# Patient Record
Sex: FEMALE | Race: WHITE | Employment: UNEMPLOYED | URBAN - METROPOLITAN AREA
[De-identification: names, ages, dates, MRNs, and addresses within clinical notes are randomized per-mention and may not be internally consistent; named-entity substitution may affect disease eponyms.]

---

## 2017-07-13 ENCOUNTER — HOSPITAL ENCOUNTER (EMERGENCY)
Age: 72
Discharge: HOME | End: 2017-07-13
Attending: EMERGENCY MEDICINE
Payer: MEDICARE

## 2017-07-13 ENCOUNTER — APPOINTMENT (OUTPATIENT)
Dept: GENERAL RADIOLOGY | Age: 72
End: 2017-07-13
Attending: EMERGENCY MEDICINE
Payer: MEDICARE

## 2017-07-13 VITALS
SYSTOLIC BLOOD PRESSURE: 136 MMHG | TEMPERATURE: 98.1 F | RESPIRATION RATE: 17 BRPM | HEIGHT: 63 IN | HEART RATE: 61 BPM | WEIGHT: 214.29 LBS | BODY MASS INDEX: 37.97 KG/M2 | OXYGEN SATURATION: 99 % | DIASTOLIC BLOOD PRESSURE: 88 MMHG

## 2017-07-13 DIAGNOSIS — I25.119 ATHEROSCLEROSIS OF NATIVE CORONARY ARTERY WITH ANGINA PECTORIS, UNSPECIFIED WHETHER NATIVE OR TRANSPLANTED HEART (HCC): ICD-10-CM

## 2017-07-13 DIAGNOSIS — R07.9 CHEST PAIN, UNSPECIFIED TYPE: Primary | ICD-10-CM

## 2017-07-13 DIAGNOSIS — J90 PLEURAL EFFUSION: ICD-10-CM

## 2017-07-13 DIAGNOSIS — R06.02 SOB (SHORTNESS OF BREATH): ICD-10-CM

## 2017-07-13 LAB
ALBUMIN SERPL BCP-MCNC: 3 G/DL (ref 3.4–5)
ALBUMIN/GLOB SERPL: 0.9 {RATIO} (ref 0.8–1.7)
ALP SERPL-CCNC: 77 U/L (ref 45–117)
ALT SERPL-CCNC: 16 U/L (ref 13–56)
ANION GAP BLD CALC-SCNC: 10 MMOL/L (ref 3–18)
APPEARANCE UR: CLEAR
APTT PPP: 28.6 SEC (ref 23–36.4)
AST SERPL W P-5'-P-CCNC: 19 U/L (ref 15–37)
BACTERIA URNS QL MICRO: ABNORMAL /HPF
BILIRUB SERPL-MCNC: 0.3 MG/DL (ref 0.2–1)
BILIRUB UR QL: ABNORMAL
BUN SERPL-MCNC: 22 MG/DL (ref 7–18)
BUN/CREAT SERPL: 18 (ref 12–20)
CALCIUM SERPL-MCNC: 8.4 MG/DL (ref 8.5–10.1)
CHLORIDE SERPL-SCNC: 108 MMOL/L (ref 100–108)
CK MB CFR SERPL CALC: 1.8 % (ref 0–4)
CK MB SERPL-MCNC: 1.4 NG/ML (ref 5–25)
CK SERPL-CCNC: 80 U/L (ref 26–192)
CO2 SERPL-SCNC: 25 MMOL/L (ref 21–32)
COLOR UR: YELLOW
CREAT SERPL-MCNC: 1.2 MG/DL (ref 0.6–1.3)
EPITH CASTS URNS QL MICRO: ABNORMAL /LPF (ref 0–5)
ERYTHROCYTE [DISTWIDTH] IN BLOOD BY AUTOMATED COUNT: 13.3 % (ref 11.6–14.5)
GLOBULIN SER CALC-MCNC: 3.2 G/DL (ref 2–4)
GLUCOSE SERPL-MCNC: 241 MG/DL (ref 74–99)
GLUCOSE UR STRIP.AUTO-MCNC: 500 MG/DL
HCT VFR BLD AUTO: 41 % (ref 35–45)
HGB BLD-MCNC: 14 G/DL (ref 12–16)
HGB UR QL STRIP: ABNORMAL
INR PPP: 1.1 (ref 0.8–1.2)
KETONES UR QL STRIP.AUTO: NEGATIVE MG/DL
LEUKOCYTE ESTERASE UR QL STRIP.AUTO: NEGATIVE
MCH RBC QN AUTO: 29.7 PG (ref 24–34)
MCHC RBC AUTO-ENTMCNC: 34.1 G/DL (ref 31–37)
MCV RBC AUTO: 86.9 FL (ref 74–97)
NITRITE UR QL STRIP.AUTO: NEGATIVE
PH UR STRIP: 5.5 [PH] (ref 5–8)
PLATELET # BLD AUTO: 116 K/UL (ref 135–420)
PMV BLD AUTO: 11.5 FL (ref 9.2–11.8)
POTASSIUM SERPL-SCNC: 4 MMOL/L (ref 3.5–5.5)
PROT SERPL-MCNC: 6.2 G/DL (ref 6.4–8.2)
PROT UR STRIP-MCNC: >300 MG/DL
PROTHROMBIN TIME: 14.1 SEC (ref 11.5–15.2)
RBC # BLD AUTO: 4.72 M/UL (ref 4.2–5.3)
RBC #/AREA URNS HPF: ABNORMAL /HPF (ref 0–5)
SODIUM SERPL-SCNC: 143 MMOL/L (ref 136–145)
SP GR UR REFRACTOMETRY: 1.03 (ref 1–1.03)
TROPONIN I SERPL-MCNC: <0.02 NG/ML (ref 0–0.04)
UROBILINOGEN UR QL STRIP.AUTO: 0.2 EU/DL (ref 0.2–1)
WBC # BLD AUTO: 5.7 K/UL (ref 4.6–13.2)
WBC URNS QL MICRO: ABNORMAL /HPF (ref 0–4)

## 2017-07-13 PROCEDURE — 80053 COMPREHEN METABOLIC PANEL: CPT | Performed by: EMERGENCY MEDICINE

## 2017-07-13 PROCEDURE — 85610 PROTHROMBIN TIME: CPT | Performed by: EMERGENCY MEDICINE

## 2017-07-13 PROCEDURE — 85027 COMPLETE CBC AUTOMATED: CPT | Performed by: EMERGENCY MEDICINE

## 2017-07-13 PROCEDURE — 85730 THROMBOPLASTIN TIME PARTIAL: CPT | Performed by: EMERGENCY MEDICINE

## 2017-07-13 PROCEDURE — 99285 EMERGENCY DEPT VISIT HI MDM: CPT

## 2017-07-13 PROCEDURE — 81001 URINALYSIS AUTO W/SCOPE: CPT | Performed by: EMERGENCY MEDICINE

## 2017-07-13 PROCEDURE — 74011250636 HC RX REV CODE- 250/636: Performed by: EMERGENCY MEDICINE

## 2017-07-13 PROCEDURE — 93005 ELECTROCARDIOGRAM TRACING: CPT

## 2017-07-13 PROCEDURE — 82550 ASSAY OF CK (CPK): CPT | Performed by: EMERGENCY MEDICINE

## 2017-07-13 PROCEDURE — 71010 XR CHEST SNGL V: CPT

## 2017-07-13 RX ORDER — LEVOFLOXACIN 750 MG/1
750 TABLET ORAL DAILY
Qty: 5 TAB | Refills: 0 | Status: SHIPPED | OUTPATIENT
Start: 2017-07-13 | End: 2017-07-18

## 2017-07-13 RX ADMIN — SODIUM CHLORIDE 1000 ML: 900 INJECTION, SOLUTION INTRAVENOUS at 18:32

## 2017-07-13 NOTE — ED NOTES
Left without discharge instructionsPurposeful rounding completed:    Side rails up x 1:  YES   Bed low and wheels and locked: YES   Call bell in reach: YES   Comfort addressed: YES     Toileting needs addressed: YES   Plan of care reviewed/updated with patient and or family members: YES   IV site assessed: YES  Pain assessed and addressed: YES0

## 2017-07-13 NOTE — ED PROVIDER NOTES
HPI Comments: 3:57 PM Norma Martinez is a 73 y.o. female with a hx of DM, HTN, COPD, CAD, and MI who presents to the ED via EMS c/o exertional episodes of chest pain and SOB for the past 3 weeks. The pt notes that her episodes take hours and sometimes days to resolve. She continues to explain that her discomfort makes her anxious as well and the only way to ease the anxiety is to talk to friends. The patient reports that she had a MI 4 years ago w/ cardiac stents placed. The patient denies a h/o anxiety and depression, leg swelling, abd pain, NVD, black or bloody stool, and any other sx. PCP:  Elham Schroeder MD      The history is provided by the patient. Past Medical History:   Diagnosis Date    Arthritis     Bilateral cataracts     sees Dr. Harjit Gomez    COPD     Coronary artery disease     Cx - 2.75 x 33mm XIENCE 10/13     Diabetes     Dyslipidemia     Hypertension     MI (myocardial infarction) ([address]     Noncompliance     Obesity        Past Surgical History:   Procedure Laterality Date    HX CORONARY STENT PLACEMENT  2013         Family History:   Problem Relation Age of Onset    Arthritis-osteo Mother     Heart Disease Mother     Alcohol abuse Neg Hx     Asthma Neg Hx     Bleeding Prob Neg Hx     Cancer Neg Hx     Diabetes Neg Hx     Elevated Lipids Neg Hx     Headache Neg Hx     Hypertension Neg Hx     Lung Disease Neg Hx     Migraines Neg Hx     Psychiatric Disorder Neg Hx     Stroke Neg Hx     Mental Retardation Neg Hx        Social History     Social History    Marital status: UNKNOWN     Spouse name: N/A    Number of children: N/A    Years of education: N/A     Occupational History    Not on file.      Social History Main Topics    Smoking status: Former Smoker     Packs/day: 1.00     Years: 54.00     Start date: 2/10/2014    Smokeless tobacco: Never Used      Comment: pt states she uses e-cigs to stop smoking    Alcohol use No    Drug use: No    Sexual activity: Not Currently     Other Topics Concern    Not on file     Social History Narrative         ALLERGIES: Review of patient's allergies indicates no known allergies. Review of Systems   Constitutional: Negative for diaphoresis and fever. HENT: Negative for ear pain, rhinorrhea and trouble swallowing. Eyes: Negative for visual disturbance. Respiratory: Positive for shortness of breath. Negative for cough. Cardiovascular: Positive for chest pain. Negative for leg swelling. Gastrointestinal: Negative for abdominal pain, blood in stool, diarrhea, nausea and vomiting. Genitourinary: Negative for difficulty urinating, flank pain and hematuria. Musculoskeletal: Negative for back pain and neck pain. Skin: Negative for rash. Neurological: Negative for dizziness, weakness, numbness and headaches. Hematological: Negative. Psychiatric/Behavioral: Negative. All other systems reviewed and are negative. Vitals:    07/13/17 1500 07/13/17 1500 07/13/17 1503 07/13/17 1855   BP: 109/52   158/76   Pulse: 62  (!) 57 68   Resp: 16  17 14   Temp:       SpO2: 98% 100% 95% 98%   Weight:       Height:                Physical Exam   Constitutional: She is oriented to person, place, and time. She appears well-developed and well-nourished. No distress. HENT:   Head: Normocephalic and atraumatic. Right Ear: External ear normal.   Left Ear: External ear normal.   Nose: Nose normal.   Mouth/Throat: Oropharynx is clear and moist. Mucous membranes are dry. Eyes: Conjunctivae and EOM are normal. Pupils are equal, round, and reactive to light. No scleral icterus. Neck: Normal range of motion. Neck supple. No JVD present. No tracheal deviation present. No thyromegaly present. Cardiovascular: Normal rate, regular rhythm, normal heart sounds and intact distal pulses. Exam reveals no gallop and no friction rub. No murmur heard.   Pulmonary/Chest: Effort normal and breath sounds normal. She exhibits no tenderness. Abdominal: Soft. Bowel sounds are normal. She exhibits no distension. There is no tenderness. There is no rebound and no guarding. Musculoskeletal: Normal range of motion. She exhibits no edema or tenderness. Lymphadenopathy:     She has no cervical adenopathy. Neurological: She is alert and oriented to person, place, and time. No cranial nerve deficit. Coordination normal.   No sensory loss, Gait normal, Motor 5/5   Skin: Skin is warm and dry. Psychiatric: She has a normal mood and affect. Her behavior is normal. Judgment and thought content normal.   Nursing note and vitals reviewed. MDM  Number of Diagnoses or Management Options  Diagnosis management comments: 74 y/o diabetic w/ a h/o CAD w/ stents 4 years ago presents w/ exertional CP for the last 3 weeks. Will check cardiac labs and contact pt's cardiologist. Pt needs admission. ED Course       Procedures    Vitals:  Patient Vitals for the past 12 hrs:   Temp Pulse Resp BP SpO2   07/13/17 1855 - 68 14 158/76 98 %   07/13/17 1503 - (!) 57 17 - 95 %   07/13/17 1500 - - - - 100 %   07/13/17 1500 - 62 16 109/52 98 %   07/13/17 1455 - 66 14 - 96 %   07/13/17 1451 98.1 °F (36.7 °C) 68 14 112/52 97 %   95% on RA, indicating adequate oxygenation.         Medications ordered:   Medications   sodium chloride 0.9 % bolus infusion 1,000 mL (0 mL IntraVENous IV Completed 7/13/17 1900)         Lab findings:  Recent Results (from the past 12 hour(s))   CARDIAC PANEL,(CK, CKMB & TROPONIN)    Collection Time: 07/13/17  2:55 PM   Result Value Ref Range    CK 80 26 - 192 U/L    CK - MB 1.4 <3.6 ng/ml    CK-MB Index 1.8 0.0 - 4.0 %    Troponin-I, Qt. <0.02 0.0 - 0.045 NG/ML   CBC W/O DIFF    Collection Time: 07/13/17  2:55 PM   Result Value Ref Range    WBC 5.7 4.6 - 13.2 K/uL    RBC 4.72 4.20 - 5.30 M/uL    HGB 14.0 12.0 - 16.0 g/dL    HCT 41.0 35.0 - 45.0 %    MCV 86.9 74.0 - 97.0 FL    MCH 29.7 24.0 - 34.0 PG    MCHC 34.1 31.0 - 37.0 g/dL    RDW 13.3 11.6 - 14.5 %    PLATELET 502 (L) 141 - 420 K/uL    MPV 11.5 9.2 - 31.5 FL   METABOLIC PANEL, COMPREHENSIVE    Collection Time: 07/13/17  2:55 PM   Result Value Ref Range    Sodium 143 136 - 145 mmol/L    Potassium 4.0 3.5 - 5.5 mmol/L    Chloride 108 100 - 108 mmol/L    CO2 25 21 - 32 mmol/L    Anion gap 10 3.0 - 18 mmol/L    Glucose 241 (H) 74 - 99 mg/dL    BUN 22 (H) 7.0 - 18 MG/DL    Creatinine 1.20 0.6 - 1.3 MG/DL    BUN/Creatinine ratio 18 12 - 20      GFR est AA 54 (L) >60 ml/min/1.73m2    GFR est non-AA 45 (L) >60 ml/min/1.73m2    Calcium 8.4 (L) 8.5 - 10.1 MG/DL    Bilirubin, total 0.3 0.2 - 1.0 MG/DL    ALT (SGPT) 16 13 - 56 U/L    AST (SGOT) 19 15 - 37 U/L    Alk. phosphatase 77 45 - 117 U/L    Protein, total 6.2 (L) 6.4 - 8.2 g/dL    Albumin 3.0 (L) 3.4 - 5.0 g/dL    Globulin 3.2 2.0 - 4.0 g/dL    A-G Ratio 0.9 0.8 - 1.7     PROTHROMBIN TIME + INR    Collection Time: 07/13/17  2:55 PM   Result Value Ref Range    Prothrombin time 14.1 11.5 - 15.2 sec    INR 1.1 0.8 - 1.2     PTT    Collection Time: 07/13/17  2:55 PM   Result Value Ref Range    aPTT 28.6 23.0 - 36.4 SEC   EKG, 12 LEAD, INITIAL    Collection Time: 07/13/17  3:01 PM   Result Value Ref Range    Ventricular Rate 62 BPM    Atrial Rate 62 BPM    P-R Interval 200 ms    QRS Duration 132 ms    Q-T Interval 448 ms    QTC Calculation (Bezet) 454 ms    Calculated P Axis 100 degrees    Calculated R Axis -41 degrees    Calculated T Axis 17 degrees    Diagnosis       Normal sinus rhythm with sinus arrhythmia  Left axis deviation  Right bundle branch block  Voltage criteria for left ventricular hypertrophy  Abnormal ECG  When compared with ECG of 25-AUG-2016 18:42,  No significant change was found         EKG interpretation by ED Physician: 62 hr/sr  Left axis  rbbb  lvh      X-Ray, CT or other radiology findings or impressions:  XR CHEST SNGL V    (Results Pending)   I will treat for possible pneumonia on left lung.     Progress notes, Consult notes or additional Procedure notes:       Reevaluation of patient:   6:58 PM Rechecked the patient and discussed all lab results. The pt understands all labs and concern for heart failure with past history. The patient is refusing to stay and would like to go home without cardiac work-up. I will call pt's cardiologist and attempt to get out pateint work up for her. She undersatnds this is suboptimal and needs close follow up. She verbalizes that she agrees to follow up and understands the risks. 7:11 PM Pt CXR reveals pleural infusion vs infection. Pt states she has no SOB, cough, or CP. I explained that she may have bad pneumonia or infection but she still wants to leave. Pt understand the urgency and criticality of her staying but she has things to take care of at home. All worseing of symptoms and death, and irregular heart beat, stroke and permanent disability are discussed and patient verbalizes her understanding. 7:13 PM I discussed care with [facility], cardiology [place]. She states that they will follow up with patient in out patient. Disposition:  Diagnosis:   1. Chest pain, unspecified type    2. Atherosclerosis of native coronary artery with angina pectoris, unspecified whether native or transplanted heart ([facility] [address]    3. SOB (shortness of breath)    4. Pleural effusion        Disposition: Discharge     Follow-up Information     Follow up With Details Comments [address], [place]   [address] [facility] [address] [place] [address]      Zach Gutierrez MD Call in 1 day Follow Up From Emergency Department [address] [facility]  [address]  [facility]  [place] [address] [address]             Patient's Medications   Start Taking    LEVOFLOXACIN (LEVAQUIN) 750 MG TABLET    Take 1 Tab by mouth daily for 5 days. Continue Taking    ASPIRIN 81 MG CHEWABLE TABLET    Take 1 Tab by mouth daily.     BLOOD-GLUCOSE METER MONITORING KIT    Use tid    GABAPENTIN (NEURONTIN) 300 MG CAPSULE    Take 1 capsule by mouth nightly. GLUCOSE 4 GRAM CHEWABLE TABLET    Take 4 tablets by mouth as needed (hypoglcemia). GLUCOSE BLOOD VI TEST STRIPS (ASCENSIA AUTODISC VI, ONE TOUCH ULTRA TEST VI) STRIP    by Does Not Apply route See Admin Instructions. Use tid    INSULIN ASPART (NOVOLOG FLEXPEN) 100 UNIT/ML FLEXPEN    3 Units by SubCUTAneous route Before breakfast, lunch, and dinner. INSULIN GLARGINE (LANTUS SOLOSTAR) 100 UNIT/ML (3 ML) PEN    10 Units by SubCUTAneous route daily. INSULIN NEEDLES, DISPOSABLE, (LITE TOUCH INSULIN PEN NEEDLES) 31 X 1/4 \" NDLE    1 each by Does Not Apply route five (5) times daily. INSULIN NEEDLES, DISPOSABLE, 31 X 5/16 \" NDLE    Use bid    INSULIN NEEDLES, DISPOSABLE, 31 X 5/16 \" NDLE    1qd    LANCETS MISC    Use tid    NITROGLYCERIN (NITROSTAT) 0.4 MG SL TABLET    1 Tab by SubLINGual route every five (5) minutes as needed for Chest Pain. These Medications have changed    No medications on file   Stop Taking    No medications on file         SCRIBE ATTESTATION STATEMENT  Documented by: Praveen James scribing for, and in the presence of, Camila Hudson MD 07/13/17 7:22 PM     Signed by: Madeleine Juarez, 07/13/17 4:13 PM     PROVIDER ATTESTATION STATEMENT  I personally performed the services described in the documentation, reviewed the documentation, as recorded by the scribe in my presence, and it accurately and completely records my words and actions.   Camila Hudson MD

## 2017-07-13 NOTE — DISCHARGE INSTRUCTIONS
Chest Pain: Care Instructions  Your Care Instructions  There are many things that can cause chest pain. Some are not serious and will get better on their own in a few days. But some kinds of chest pain need more testing and treatment. Your doctor may have recommended a follow-up visit in the next 8 to 12 hours. If you are not getting better, you may need more tests or treatment. Even though your doctor has released you, you still need to watch for any problems. The doctor carefully checked you, but sometimes problems can develop later. If you have new symptoms or if your symptoms do not get better, get medical care right away. If you have worse or different chest pain or pressure that lasts more than 5 minutes or you passed out (lost consciousness), call 911 or seek other emergency help right away. A medical visit is only one step in your treatment. Even if you feel better, you still need to do what your doctor recommends, such as going to all suggested follow-up appointments and taking medicines exactly as directed. This will help you recover and help prevent future problems. How can you care for yourself at home? · Rest until you feel better. · Take your medicine exactly as prescribed. Call your doctor if you think you are having a problem with your medicine. · Do not drive after taking a prescription pain medicine. When should you call for help? Call 911 if:  · You passed out (lost consciousness). · You have severe difficulty breathing. · You have symptoms of a heart attack. These may include:  ¨ Chest pain or pressure, or a strange feeling in your chest.  ¨ Sweating. ¨ Shortness of breath. ¨ Nausea or vomiting. ¨ Pain, pressure, or a strange feeling in your back, neck, jaw, or upper belly or in one or both shoulders or arms. ¨ Lightheadedness or sudden weakness. ¨ A fast or irregular heartbeat.   After you call 911, the  may tell you to chew 1 adult-strength or 2 to 4 low-dose aspirin. Wait for an ambulance. Do not try to drive yourself. Call your doctor today if:  · You have any trouble breathing. · Your chest pain gets worse. · You are dizzy or lightheaded, or you feel like you may faint. · You are not getting better as expected. · You are having new or different chest pain. Where can you learn more? Go to http://maureen-satish.info/. Enter A120 in the search box to learn more about \"Chest Pain: Care Instructions. \"  Current as of: March 20, 2017  Content Version: 11.3  © 9404-3544 [employer]. Care instructions adapted under license by [employer] (which disclaims liability or warranty for this information). If you have questions about a medical condition or this instruction, always ask your healthcare professional. [address] any warranty or liability for your use of this information. Angina: Care Instructions  Your Care Instructions    You have a problem called angina. Angina happens when there is not enough blood flow to your heart muscle. Angina is a sign of coronary artery disease (CAD). CAD occurs when blood vessels that supply the heart become narrowed. Having CAD increases your risk of a heart attack. Chest pain or pressure is the most common symptom of angina. But some people have other symptoms, like:  · Pain, pressure, or a strange feeling in the back, neck, jaw, or upper belly, or in one or both shoulders or arms. · Shortness of breath. · Nausea or vomiting. · Lightheadedness or sudden weakness. · Fast or irregular heartbeat. Women are somewhat more likely than men to have angina symptoms like shortness of breath, nausea, and back or jaw pain. Angina can be dangerous. That's why it is important to pay attention to your symptoms. Know what is typical for you, learn how to control your symptoms, and understand when you need to get treatment.   A change in your usual pattern of symptoms is an emergency. It may mean that you are having a heart attack. The doctor has checked you carefully, but problems can develop later. If you notice any problems or new symptoms, get medical treatment right away. Follow-up care is a key part of your treatment and safety. Be sure to make and go to all appointments, and call your doctor if you are having problems. It's also a good idea to know your test results and keep a list of the medicines you take. How can you care for yourself at home? Medicines  · If your doctor has given you nitroglycerin for angina symptoms, keep it with you at all times. If you have symptoms, sit down and rest, and take the first dose of nitroglycerin as directed. If your symptoms get worse or are not getting better within 5 minutes, call 911 right away. Stay on the phone. The emergency  will give you further instructions. · If your doctor advises it, take 1 low-dose aspirin a day to prevent heart attack. · Be safe with medicines. Take your medicines exactly as prescribed. Call your doctor if you think you are having a problem with your medicine. You will get more details on the specific medicines your doctor prescribes. Lifestyle changes  · Do not smoke. If you need help quitting, talk to your doctor about stop-smoking programs and medicines. These can increase your chances of quitting for good. · Eat a heart-healthy diet that is low in saturated fat and salt, and is high in fiber. Talk to your doctor or a dietitian about healthy eating. · Stay at a healthy weight. Or lose weight if you need to. Activity  · Talk to your doctor about a level of activity that is safe for you. · If an activity causes angina symptoms, stop and rest.  When should you call for help? Call 911 anytime you think you may need emergency care. For example, call if:  · You passed out (lost consciousness). · You have symptoms of a heart attack.  These may include:  ¨ Chest pain or pressure, or a strange feeling in the chest.  ¨ Sweating. ¨ Shortness of breath. ¨ Nausea or vomiting. ¨ Pain, pressure, or a strange feeling in the back, neck, jaw, or upper belly or in one or both shoulders or arms. ¨ Lightheadedness or sudden weakness. ¨ A fast or irregular heartbeat. After you call 911, the  may tell you to chew 1 adult-strength or 2 to 4 low-dose aspirin. Wait for an ambulance. Do not try to drive yourself. · You have angina symptoms that do not go away with rest or are not getting better within 5 minutes after you take a dose of nitroglycerin. Call your doctor now or seek immediate medical care if:  · You are having angina symptoms more often than usual, or they are different or worse than usual.  · You feel dizzy or lightheaded, or you feel like you may faint. Watch closely for changes in your health, and be sure to contact your doctor if you have any problems. Where can you learn more? Go to http://maureen-satish.info/. Enter H129 in the search box to learn more about \"Angina: Care Instructions. \"  Current as of: April 3, 2017  Content Version: 11.3  © 4849-3803 [employer]. Care instructions adapted under license by [employer] (which disclaims liability or warranty for this information). If you have questions about a medical condition or this instruction, always ask your healthcare professional. [place] [address] any warranty or liability for your use of this information. Learning About Coronary Artery Disease (CAD)  What is coronary artery disease? Coronary artery disease (CAD) occurs when plaque builds up in the arteries that bring oxygen-rich blood to your heart. Plaque is a fatty substance made of cholesterol, calcium, and other substances in the blood. This process is called hardening of the arteries, or atherosclerosis. What happens when you have coronary artery disease? · Plaque may narrow the coronary arteries. Narrowed arteries cause poor blood flow. This can lead to angina symptoms such as chest pain or discomfort. If blood flow is completely blocked, you could have a heart attack. · You can slow CAD and reduce the risk of future problems by making changes in your lifestyle. These include quitting smoking and eating heart-healthy foods. · Treatments for CAD, along with changes in your lifestyle, can help you live a longer and healthier life. How can you prevent coronary artery disease? · Do not smoke. It may be the best thing you can do to prevent heart disease. If you need help quitting, talk to your doctor about stop-smoking programs and medicines. These can increase your chances of quitting for good. · Be active. Get at least 30 minutes of exercise on most days of the week. Walking is a good choice. You also may want to do other activities, such as running, swimming, cycling, or playing tennis or team sports. · Eat heart-healthy foods. Eat more fruits and vegetables and less foods that contain saturated and trans fats. Limit alcohol, sodium, and sweets. · Stay at a healthy weight. Lose weight if you need to. · Manage other health problems such as diabetes, high blood pressure, and high cholesterol. · Manage stress. Stress can hurt your heart. To keep stress low, talk about your problems and feelings. Don't keep your feelings hidden. · If you have talked about it with your doctor, take a low-dose aspirin every day. Aspirin can help certain people lower their risk of a heart attack or stroke. But taking aspirin isn't right for everyone, because it can cause serious bleeding. Do not start taking daily aspirin unless your doctor knows about it. How is coronary artery disease treated? · Your doctor will suggest that you make lifestyle changes. For example, your doctor may ask you to eat healthy foods, quit smoking, lose extra weight, and be more active. · You will have to take medicines.   · Your doctor may suggest a procedure to open narrowed or blocked arteries. This is called angioplasty. Or your doctor may suggest using healthy blood vessels to create detours around narrowed or blocked arteries. This is called bypass surgery. Follow-up care is a key part of your treatment and safety. Be sure to make and go to all appointments, and call your doctor if you are having problems. It's also a good idea to know your test results and keep a list of the medicines you take. Where can you learn more? Go to http://maureen-satish.info/. Enter [phone] in the search box to learn more about \"Learning About Coronary Artery Disease (CAD). \"  Current as of: December 28, 2016  Content Version: 11.3  © 9156-3833 [employer]. Care instructions adapted under license by [employer] (which disclaims liability or warranty for this information). If you have questions about a medical condition or this instruction, always ask your healthcare professional. [place] [address] any warranty or liability for your use of this information.

## 2017-07-14 ENCOUNTER — HOSPITAL ENCOUNTER (EMERGENCY)
Age: 72
Discharge: HOME | End: 2017-07-15
Attending: EMERGENCY MEDICINE
Payer: MEDICARE

## 2017-07-14 DIAGNOSIS — F17.210 CIGARETTE SMOKER: ICD-10-CM

## 2017-07-14 DIAGNOSIS — R06.00 DYSPNEA, UNSPECIFIED TYPE: Primary | ICD-10-CM

## 2017-07-14 LAB
ATRIAL RATE: 62 BPM
CALCULATED P AXIS, ECG09: 100 DEGREES
CALCULATED R AXIS, ECG10: -41 DEGREES
CALCULATED T AXIS, ECG11: 17 DEGREES
DIAGNOSIS, 93000: NORMAL
P-R INTERVAL, ECG05: 200 MS
Q-T INTERVAL, ECG07: 448 MS
QRS DURATION, ECG06: 132 MS
QTC CALCULATION (BEZET), ECG08: 454 MS
VENTRICULAR RATE, ECG03: 62 BPM

## 2017-07-14 PROCEDURE — 83880 ASSAY OF NATRIURETIC PEPTIDE: CPT | Performed by: EMERGENCY MEDICINE

## 2017-07-14 PROCEDURE — 99284 EMERGENCY DEPT VISIT MOD MDM: CPT

## 2017-07-14 PROCEDURE — 82550 ASSAY OF CK (CPK): CPT | Performed by: EMERGENCY MEDICINE

## 2017-07-14 PROCEDURE — 83735 ASSAY OF MAGNESIUM: CPT | Performed by: EMERGENCY MEDICINE

## 2017-07-14 PROCEDURE — 85379 FIBRIN DEGRADATION QUANT: CPT | Performed by: EMERGENCY MEDICINE

## 2017-07-14 PROCEDURE — 93005 ELECTROCARDIOGRAM TRACING: CPT

## 2017-07-14 PROCEDURE — 80048 BASIC METABOLIC PNL TOTAL CA: CPT | Performed by: EMERGENCY MEDICINE

## 2017-07-14 PROCEDURE — 85025 COMPLETE CBC W/AUTO DIFF WBC: CPT | Performed by: EMERGENCY MEDICINE

## 2017-07-15 ENCOUNTER — APPOINTMENT (OUTPATIENT)
Dept: GENERAL RADIOLOGY | Age: 72
End: 2017-07-15
Attending: EMERGENCY MEDICINE
Payer: MEDICARE

## 2017-07-15 VITALS
OXYGEN SATURATION: 95 % | HEIGHT: 63 IN | RESPIRATION RATE: 16 BRPM | TEMPERATURE: 98.1 F | DIASTOLIC BLOOD PRESSURE: 68 MMHG | HEART RATE: 66 BPM | BODY MASS INDEX: 37.97 KG/M2 | SYSTOLIC BLOOD PRESSURE: 147 MMHG | WEIGHT: 214.29 LBS

## 2017-07-15 LAB
ANION GAP BLD CALC-SCNC: 10 MMOL/L (ref 3–18)
ATRIAL RATE: 76 BPM
BASOPHILS # BLD AUTO: 0 K/UL (ref 0–0.06)
BASOPHILS # BLD: 1 % (ref 0–2)
BNP SERPL-MCNC: 354 PG/ML (ref 0–900)
BUN SERPL-MCNC: 24 MG/DL (ref 7–18)
BUN/CREAT SERPL: 20 (ref 12–20)
CALCIUM SERPL-MCNC: 8.9 MG/DL (ref 8.5–10.1)
CALCULATED P AXIS, ECG09: 100 DEGREES
CALCULATED R AXIS, ECG10: -49 DEGREES
CALCULATED T AXIS, ECG11: 43 DEGREES
CHLORIDE SERPL-SCNC: 110 MMOL/L (ref 100–108)
CK MB CFR SERPL CALC: 1.8 % (ref 0–4)
CK MB SERPL-MCNC: 1.7 NG/ML (ref 5–25)
CK SERPL-CCNC: 93 U/L (ref 26–192)
CO2 SERPL-SCNC: 24 MMOL/L (ref 21–32)
CREAT SERPL-MCNC: 1.18 MG/DL (ref 0.6–1.3)
D DIMER PPP FEU-MCNC: 0.35 UG/ML(FEU)
DIAGNOSIS, 93000: NORMAL
DIFFERENTIAL METHOD BLD: ABNORMAL
EOSINOPHIL # BLD: 0 K/UL (ref 0–0.4)
EOSINOPHIL NFR BLD: 1 % (ref 0–5)
ERYTHROCYTE [DISTWIDTH] IN BLOOD BY AUTOMATED COUNT: 13.3 % (ref 11.6–14.5)
GLUCOSE SERPL-MCNC: 190 MG/DL (ref 74–99)
HCT VFR BLD AUTO: 39.5 % (ref 35–45)
HGB BLD-MCNC: 13.4 G/DL (ref 12–16)
LYMPHOCYTES # BLD AUTO: 23 % (ref 21–52)
LYMPHOCYTES # BLD: 1.3 K/UL (ref 0.9–3.6)
MAGNESIUM SERPL-MCNC: 2.3 MG/DL (ref 1.6–2.6)
MCH RBC QN AUTO: 29.4 PG (ref 24–34)
MCHC RBC AUTO-ENTMCNC: 33.9 G/DL (ref 31–37)
MCV RBC AUTO: 86.6 FL (ref 74–97)
MONOCYTES # BLD: 0.5 K/UL (ref 0.05–1.2)
MONOCYTES NFR BLD AUTO: 9 % (ref 3–10)
NEUTS SEG # BLD: 3.8 K/UL (ref 1.8–8)
NEUTS SEG NFR BLD AUTO: 66 % (ref 40–73)
P-R INTERVAL, ECG05: 308 MS
PLATELET # BLD AUTO: 116 K/UL (ref 135–420)
PMV BLD AUTO: 11.1 FL (ref 9.2–11.8)
POTASSIUM SERPL-SCNC: 3.6 MMOL/L (ref 3.5–5.5)
Q-T INTERVAL, ECG07: 408 MS
QRS DURATION, ECG06: 124 MS
QTC CALCULATION (BEZET), ECG08: 459 MS
RBC # BLD AUTO: 4.56 M/UL (ref 4.2–5.3)
SODIUM SERPL-SCNC: 144 MMOL/L (ref 136–145)
TROPONIN I SERPL-MCNC: <0.02 NG/ML (ref 0–0.04)
VENTRICULAR RATE, ECG03: 76 BPM
WBC # BLD AUTO: 5.7 K/UL (ref 4.6–13.2)

## 2017-07-15 PROCEDURE — 74011250637 HC RX REV CODE- 250/637: Performed by: EMERGENCY MEDICINE

## 2017-07-15 PROCEDURE — 71010 XR CHEST PORT: CPT

## 2017-07-15 RX ORDER — LORAZEPAM 1 MG/1
1 TABLET ORAL
Status: COMPLETED | OUTPATIENT
Start: 2017-07-15 | End: 2017-07-15

## 2017-07-15 RX ADMIN — LORAZEPAM 1 MG: 1 TABLET ORAL at 00:44

## 2017-07-15 NOTE — DISCHARGE INSTRUCTIONS
SPECIFIC PATIENT INSTRUCTIONS FROM THE PHYSICIAN WHO TREATED YOU IN THE ER TODAY:  1. Return if any concerns or worsening of condition(s)  2. FOLLOW UP APPOINTMENT:  Your primary doctor in 1-2 days. 3. Smoking is an addictive habit. It may be difficult to quit smoking on your own. Seek the help of your primary doctor for assistance and guidance in quitting smoking. Shortness of Breath: Care Instructions  Your Care Instructions  Shortness of breath has many causes. Sometimes conditions such as anxiety can lead to shortness of breath. Some people get mild shortness of breath when they exercise. Trouble breathing also can be a symptom of a serious problem, such as asthma, lung disease, emphysema, heart problems, and pneumonia. If your shortness of breath continues, you may need tests and treatment. Watch for any changes in your breathing and other symptoms. Follow-up care is a key part of your treatment and safety. Be sure to make and go to all appointments, and call your doctor if you are having problems. Its also a good idea to know your test results and keep a list of the medicines you take. How can you care for yourself at home? · Do not smoke or allow others to smoke around you. If you need help quitting, talk to your doctor about stop-smoking programs and medicines. These can increase your chances of quitting for good. · Get plenty of rest and sleep. · Take your medicines exactly as prescribed. Call your doctor if you think you are having a problem with your medicine. · Find healthy ways to deal with stress. ¨ Exercise daily. ¨ Get plenty of sleep. ¨ Eat regularly and well. When should you call for help? Call 911 anytime you think you may need emergency care. For example, call if:  · You have severe shortness of breath. · You have symptoms of a heart attack. These may include:  ¨ Chest pain or pressure, or a strange feeling in the chest.  ¨ Sweating. ¨ Shortness of breath.   ¨ Nausea or vomiting. ¨ Pain, pressure, or a strange feeling in the back, neck, jaw, or upper belly or in one or both shoulders or arms. ¨ Lightheadedness or sudden weakness. ¨ A fast or irregular heartbeat. After you call 911, the  may tell you to chew 1 adult-strength or 2 to 4 low-dose aspirin. Wait for an ambulance. Do not try to drive yourself. Call your doctor now or seek immediate medical care if:  · Your shortness of breath gets worse or you start to wheeze. Wheezing is a high-pitched sound when you breathe. · You wake up at night out of breath or have to prop your head up on several pillows to breathe. · You are short of breath after only light activity or while at rest.  Watch closely for changes in your health, and be sure to contact your doctor if:  · You do not get better over the next 1 to 2 days. Where can you learn more? Go to http://maureen-satish.info/. Enter S780 in the search box to learn more about \"Shortness of Breath: Care Instructions. \"  Current as of: March 25, 2017  Content Version: 11.3  © 9311-9334 [employer]. Care instructions adapted under license by [employer] (which disclaims liability or warranty for this information). If you have questions about a medical condition or this instruction, always ask your healthcare professional. [place] [address] any warranty or liability for your use of this information. Stopping Smoking: Care Instructions  Your Care Instructions  Cigarette smokers crave the nicotine in cigarettes. Giving it up is much harder than simply changing a habit. Your body has to stop craving the nicotine. It is hard to quit, but you can do it. There are many tools that people use to quit smoking. You may find that combining tools works best for you. There are several steps to quitting. First you get ready to quit. Then you get support to help you.  After that, you learn new skills and behaviors to become a nonsmoker. For many people, a necessary step is getting and using medicine. Your doctor will help you set up the plan that best meets your needs. You may want to attend a smoking cessation program to help you quit smoking. When you choose a program, look for one that has proven success. Ask your doctor for ideas. You will greatly increase your chances of success if you take medicine as well as get counseling or join a cessation program.  Some of the changes you feel when you first quit tobacco are uncomfortable. Your body will miss the nicotine at first, and you may feel short-tempered and grumpy. You may have trouble sleeping or concentrating. Medicine can help you deal with these symptoms. You may struggle with changing your smoking habits and rituals. The last step is the tricky one: Be prepared for the smoking urge to continue for a time. This is a lot to deal with, but keep at it. You will feel better. Follow-up care is a key part of your treatment and safety. Be sure to make and go to all appointments, and call your doctor if you are having problems. Its also a good idea to know your test results and keep a list of the medicines you take. How can you care for yourself at home? · Ask your family, friends, and coworkers for support. You have a better chance of quitting if you have help and support. · Join a support group, such as Nicotine Anonymous, for people who are trying to quit smoking. · Consider signing up for a smoking cessation program, such as the American Lung Association's Freedom from Smoking program.  · Set a quit date. Pick your date carefully so that it is not right in the middle of a big deadline or stressful time. Once you quit, do not even take a puff. Get rid of all ashtrays and lighters after your last cigarette. Clean your house and your clothes so that they do not smell of smoke. · Learn how to be a nonsmoker.  Think about ways you can avoid those things that make you reach for a cigarette. ¨ Avoid situations that put you at greatest risk for smoking. For some people, it is hard to have a drink with friends without smoking. For others, they might skip a coffee break with coworkers who smoke. ¨ Change your daily routine. Take a different route to work or eat a meal in a different place. · Cut down on stress. Calm yourself or release tension by doing an activity you enjoy, such as reading a book, taking a hot bath, or gardening. · Talk to your doctor or pharmacist about nicotine replacement therapy, which replaces the nicotine in your body. You still get nicotine but you do not use tobacco. Nicotine replacement products help you slowly reduce the amount of nicotine you need. These products come in several forms, many of them available over-the-counter:  ¨ Nicotine patches  ¨ Nicotine gum and lozenges  ¨ Nicotine inhaler  · Ask your doctor about bupropion (Wellbutrin) or varenicline (Chantix), which are prescription medicines. They do not contain nicotine. They help you by reducing withdrawal symptoms, such as stress and anxiety. · Some people find hypnosis, acupuncture, and massage helpful for ending the smoking habit. · Eat a healthy diet and get regular exercise. Having healthy habits will help your body move past its craving for nicotine. · Be prepared to keep trying. Most people are not successful the first few times they try to quit. Do not get mad at yourself if you smoke again. Make a list of things you learned and think about when you want to try again, such as next week, next month, or next year. Where can you learn more? Go to http://maureen-satish.info/. Enter K473 in the search box to learn more about \"Stopping Smoking: Care Instructions. \"  Current as of: March 20, 2017  Content Version: 11.3  © 1199-5290 [employer].  Care instructions adapted under license by [employer] (which disclaims liability or warranty for this information). If you have questions about a medical condition or this instruction, always ask your healthcare professional. [facility] [address] any warranty or liability for your use of this information. SecureDB Activation    Thank you for requesting access to [employer]. Please follow the instructions below to securely access and download your online medical record. [employer]DB allows you to send messages to your doctor, view your test results, renew your prescriptions, schedule appointments, and more. How Do I Sign Up? 1. In your internet browser, go to [email or web address]. 2. Click on the First Time User? Click Here link in the Sign In box. You will see the New Member Sign Up page. 3. Enter your SecureDB Access Code exactly as it appears below. You will not need to use this code after youve completed the sign-up process. If you do not sign up before the expiration date, you must request a new code. SecureDB Access Code: [email or web address]  Expires: 10/11/2017  7:22 PM (This is the date your SecureDB access code will )    4. Enter the last four digits of your Social Security Number (xxxx) and Date of Birth (mm/dd/yyyy) as indicated and click Submit. You will be taken to the next sign-up page. 5. Create a SecureDB ID. This will be your SecureDB login ID and cannot be changed, so think of one that is secure and easy to remember. 6. Create a SecureDB password. You can change your password at any time. 7. Enter your Password Reset Question and Answer. This can be used at a later time if you forget your password. 8. Enter your e-mail address. You will receive e-mail notification when new information is available in [address] 9. Click Sign Up. You can now view and download portions of your medical record. 10. Click the Download Summary menu link to download a portable copy of your medical information.     Additional Information    If you have questions, please visit the Frequently Asked Questions section of the [employer] website at [email or web address] Remember, [employer] is NOT to be used for urgent needs. For medical emergencies, dial 911.

## 2017-07-15 NOTE — ED TRIAGE NOTES
Patient arrives by EMS with c/o worsening shortness of breath for past couple of weeks and c/o anxiety. Patient was seen here yesterday and was suppose to be admitted for fluid around her lungs but left AMA r/t increased anxiety.

## 2017-07-15 NOTE — ED PROVIDER NOTES
[facility] EMERGENCY DEPT      73 y.o. female with noted past medical history of tobacco abuse (1 ppd for 54 years), DM, HTN, HLD, CAD, COPD, MI, and obesity, who presents to the emergency department via EMS for shortness of breath starting two weeks ago. Per patient, sx worsen since onset. She reports anxiety secondary to SOB. Per patient, she has shakiness and she feels like she cannot take a deep breath. Patient was seen yesterday in this ED for chest pain and shortness of breath. Dr. Bailey Goldberg recommended admission secondary to pleural effusion. Patient refused and left AMA secondary to anxiety. She denies hx of anxiety disorder. No other complaints. No current facility-administered medications for this encounter. Current Outpatient Prescriptions   Medication Sig    levoFLOXacin (LEVAQUIN) 750 mg tablet Take 1 Tab by mouth daily for 5 days.  Lancets misc Use tid    glucose blood VI test strips (ASCENSIA AUTODISC VI, ONE TOUCH ULTRA TEST VI) strip by Does Not Apply route See Admin Instructions. Use tid    Blood-Glucose Meter monitoring kit Use tid    insulin aspart (NOVOLOG FLEXPEN) 100 unit/mL flexpen 3 Units by SubCUTAneous route Before breakfast, lunch, and dinner.  Insulin Needles, Disposable, (LITE TOUCH INSULIN PEN NEEDLES) 31 X 1/4 \" ndle 1 each by Does Not Apply route five (5) times daily.  glucose 4 gram chewable tablet Take 4 tablets by mouth as needed (hypoglcemia).  insulin glargine (LANTUS SOLOSTAR) 100 unit/mL (3 mL) pen 10 Units by SubCUTAneous route daily.  gabapentin (NEURONTIN) 300 mg capsule Take 1 capsule by mouth nightly.  Insulin Needles, Disposable, 31 X 5/16 \" ndle 1qd    nitroglycerin (NITROSTAT) 0.4 mg SL tablet 1 Tab by SubLINGual route every five (5) minutes as needed for Chest Pain.  Insulin Needles, Disposable, 31 X 5/16 \" Ndle Use bid    aspirin 81 mg chewable tablet Take 1 Tab by mouth daily.        Past Medical History:   Diagnosis Date    Arthritis     Bilateral cataracts     sees Dr. Aric Suggs    COPD     Coronary artery disease     Cx - 2.75 x 33mm XIENCE 10/13     Diabetes     Dyslipidemia     Hypertension     MI (myocardial infarction) ([address]     Noncompliance     Obesity        Past Surgical History:   Procedure Laterality Date    HX CORONARY STENT PLACEMENT  2013       Family History   Problem Relation Age of Onset    Arthritis-osteo Mother     Heart Disease Mother     Alcohol abuse Neg Hx     Asthma Neg Hx     Bleeding Prob Neg Hx     Cancer Neg Hx     Diabetes Neg Hx     Elevated Lipids Neg Hx     Headache Neg Hx     Hypertension Neg Hx     Lung Disease Neg Hx     Migraines Neg Hx     Psychiatric Disorder Neg Hx     Stroke Neg Hx     Mental Retardation Neg Hx        Social History     Social History    Marital status: UNKNOWN     Spouse name: N/A    Number of children: N/A    Years of education: N/A     Occupational History    Not on file. Social History Main Topics    Smoking status: Former Smoker     Packs/day: 1.00     Years: 54.00     Start date: 2/10/2014    Smokeless tobacco: Never Used      Comment: pt states she uses e-cigs to stop smoking    Alcohol use No    Drug use: No    Sexual activity: Not Currently     Other Topics Concern    Not on file     Social History Narrative       No Known Allergies    Patient's primary care provider (as noted in EPIC):  Aaron Rivers MD    REVIEW OF SYSTEMS:  Constitutional:  Negative for diaphoresis. HENT:  Negative for congestion. Respiratory:  Negative for stridor. Positive for shortness of breath. Cardiovascular:  Negative for palpitations. Gastrointestinal:  Negative for diarrhea. Genitourinary:  Negative for flank pain. Musculoskeletal:  Negative for back pain. Skin:  Negative for pallor. Neurological: Negative. Negative for weakness.      Visit Vitals    /69 (BP 1 Location: Left arm, BP Patient Position: At rest)    Pulse 72  Temp 98.1 °F (36.7 °C)    Resp 15    Ht 5' 3\" (1.6 m)    Wt 97.2 kg (214 lb 4.6 oz)    SpO2 99%    BMI 37.96 kg/m2       PHYSICAL EXAM:    CONSTITUTIONAL:  Alert, in no apparent distress;  well developed;  well nourished. HEAD:  Normocephalic, atraumatic. EYES:  EOMI. Non-icteric sclera. Normal conjunctiva. ENTM:  Nose:  no rhinorrhea. Throat:  no erythema or exudate, mucous membranes moist.  NECK:  No JVD. Supple    RESPIRATORY:  Chest clear, equal breath sounds, good air movement. CARDIOVASCULAR:  Regular rate and rhythm. No murmurs, rubs, or gallops. GI:  Normal bowel sounds, abdomen soft and non-tender. No rebound or guarding. BACK:  Non-tender. UPPER EXT:  Normal inspection. LOWER EXT:  No edema, no calf tenderness. Distal pulses intact. NEURO:  Moves all four extremities, and grossly normal motor exam.  SKIN:  No rashes;  Normal for age. PSYCH:  Alert and normal affect. DIFFERENTIAL DIAGNOSES/ MEDICAL DECISION MAKING:   Shortness of breath etiologies include chronic obstructive pulmonary disease (COPD), acute asthma exacerbation, congestive heart failure, pneumonia, acute bronchitis, pulmonary embolism, upper respiratory infection, cardiac event to include acute coronary syndrome, acute myocardial infarction or a combination of the above (ex URI on top of COPD thus causing respiratory distress).       Abnormal lab results from this emergency department encounter:  Labs Reviewed   CBC WITH AUTOMATED DIFF - Abnormal; Notable for the following:        Result Value    PLATELET 822 (*)     All other components within normal limits   METABOLIC PANEL, BASIC - Abnormal; Notable for the following:     Chloride 110 (*)     Glucose 190 (*)     BUN 24 (*)     GFR est AA 55 (*)     GFR est non-AA 45 (*)     All other components within normal limits   MAGNESIUM   CARDIAC PANEL,(CK, CKMB & TROPONIN)   D DIMER   NT-PRO BNP       Lab values for this patient within approximately the last 12 hours:  Recent Results (from the past 12 hour(s))   EKG, 12 LEAD, INITIAL    Collection Time: 07/14/17 11:39 PM   Result Value Ref Range    Ventricular Rate 73 BPM    Atrial Rate 73 BPM    P-R Interval 198 ms    QRS Duration 138 ms    Q-T Interval 446 ms    QTC Calculation (Bezet) 491 ms    Calculated P Axis 107 degrees    Calculated R Axis -62 degrees    Calculated T Axis 53 degrees    Diagnosis       Undetermined rhythm  Left axis deviation  Right bundle branch block  Moderate voltage criteria for LVH, may be normal variant  Abnormal ECG  When compared with ECG of 13-JUL-2017 15:01,  Current undetermined rhythm precludes rhythm comparison, needs review     CBC WITH AUTOMATED DIFF    Collection Time: 07/14/17 11:52 PM   Result Value Ref Range    WBC 5.7 4.6 - 13.2 K/uL    RBC 4.56 4.20 - 5.30 M/uL    HGB 13.4 12.0 - 16.0 g/dL    HCT 39.5 35.0 - 45.0 %    MCV 86.6 74.0 - 97.0 FL    MCH 29.4 24.0 - 34.0 PG    MCHC 33.9 31.0 - 37.0 g/dL    RDW 13.3 11.6 - 14.5 %    PLATELET 827 (L) 563 - 420 K/uL    MPV 11.1 9.2 - 11.8 FL    NEUTROPHILS 66 40 - 73 %    LYMPHOCYTES 23 21 - 52 %    MONOCYTES 9 3 - 10 %    EOSINOPHILS 1 0 - 5 %    BASOPHILS 1 0 - 2 %    ABS. NEUTROPHILS 3.8 1.8 - 8.0 K/UL    ABS. LYMPHOCYTES 1.3 0.9 - 3.6 K/UL    ABS. MONOCYTES 0.5 0.05 - 1.2 K/UL    ABS. EOSINOPHILS 0.0 0.0 - 0.4 K/UL    ABS.  BASOPHILS 0.0 0.0 - 0.06 K/UL    DF AUTOMATED     METABOLIC PANEL, BASIC    Collection Time: 07/14/17 11:52 PM   Result Value Ref Range    Sodium 144 136 - 145 mmol/L    Potassium 3.6 3.5 - 5.5 mmol/L    Chloride 110 (H) 100 - 108 mmol/L    CO2 24 21 - 32 mmol/L    Anion gap 10 3.0 - 18 mmol/L    Glucose 190 (H) 74 - 99 mg/dL    BUN 24 (H) 7.0 - 18 MG/DL    Creatinine 1.18 0.6 - 1.3 MG/DL    BUN/Creatinine ratio 20 12 - 20      GFR est AA 55 (L) >60 ml/min/1.73m2    GFR est non-AA 45 (L) >60 ml/min/1.73m2    Calcium 8.9 8.5 - 10.1 MG/DL   MAGNESIUM    Collection Time: 07/14/17 11:52 PM   Result Value Ref Range Magnesium 2.3 1.6 - 2.6 mg/dL   CARDIAC PANEL,(CK, CKMB & TROPONIN)    Collection Time: 07/14/17 11:52 PM   Result Value Ref Range    CK 93 26 - 192 U/L    CK - MB 1.7 <3.6 ng/ml    CK-MB Index 1.8 0.0 - 4.0 %    Troponin-I, Qt. <0.02 0.0 - 0.045 NG/ML   D DIMER    Collection Time: 07/14/17 11:52 PM   Result Value Ref Range    D DIMER 0.35 <0.46 ug/ml(FEU)   NT-PRO BNP    Collection Time: 07/14/17 11:52 PM   Result Value Ref Range    NT pro- 0 - 900 PG/ML       Radiologist and cardiologist interpretations if available at time of this note:  No results found. EKG reading by Miriam Hearn MD:  NSR about 75 bpm with normal width QRS with bifascicular block. Bifascicular block seen on prior 7/13/17 EKG. No STEMI. No ST depression. Portable (A-P view) CXR:  Preliminary review of x-rays by ED Physician. Interpretation of chest X-ray shows, no infiltrates, no pneumothorax, no CHF, no effusion. Medication(s) ordered for patient during this emergency visit encounter:  Medications   LORazepam (ATIVAN) tablet 1 mg (1 mg Oral Given 7/15/17 0044)       IMPRESSION AND MEDICAL DECISION MAKING:  Based upon the patient's presentation with noted HPI and PE, along with the work up done in the emergency department, I believe that the patient is having dyspnea of uncertain etiology. However, given the work up done in the emergency department, I am comfortable with discharge of the patient and outpatient follow up with the patients primary care doctor. DIAGNOSIS:  1. Dyspnea. 2. Cigarette smoker    SPECIFIC PATIENT INSTRUCTIONS FROM THE PHYSICIAN WHO TREATED YOU IN THE ER TODAY:  1. Return if any concerns or worsening of condition(s)  2. FOLLOW UP APPOINTMENT:  Your primary doctor in 1-2 days. \  3. Smoking is an addictive habit. It may be difficult to quit smoking on your own. Seek the help of your primary doctor for assistance and guidance in quitting smoking. Jeannette Sarmiento M.D.    Fady Brunson Anurag Zheng, acting as a scribe for and in the presence of Dr. Brice Barcenas M.D  Signed By: Susan Zheng, scribe for Dr. Brice Barcenas M.D    Provider Attestation:  If a scribe was utilized in generation of this patient record, I personally performed the services described in the documentation, reviewed the documentation, as recorded by the scribe in my presence, and it accurately records the patient's history of presenting illness, review of systems, patient physical examination, and procedures performed by me as the attending physician. Josh Mcgregor M.D.   BRANDO Board Certified Emergency Physician  7/14/2017.  12:02 AM

## 2017-07-18 ENCOUNTER — TELEPHONE (OUTPATIENT)
Dept: CARDIOLOGY CLINIC | Age: 72
End: 2017-07-18

## 2017-07-18 NOTE — TELEPHONE ENCOUNTER
LMOM for patient to call about getting [facility] appointment scheduled. Letter sent.    _______________________________________________    () -   Per Siri Figueroa  1945 needs a 4 week PH with any physician. According to her chart she lives in [place] ? I left a message for patient to call our office. Thanks!       [place] [address] [address] [place] [address]  Office:  [phone]

## 2017-11-02 ENCOUNTER — APPOINTMENT (OUTPATIENT)
Dept: CT IMAGING | Age: 72
End: 2017-11-02
Attending: EMERGENCY MEDICINE
Payer: MEDICARE

## 2017-11-02 ENCOUNTER — HOSPITAL ENCOUNTER (EMERGENCY)
Age: 72
Discharge: HOME | End: 2017-11-03
Attending: EMERGENCY MEDICINE
Payer: MEDICARE

## 2017-11-02 ENCOUNTER — APPOINTMENT (OUTPATIENT)
Dept: GENERAL RADIOLOGY | Age: 72
End: 2017-11-02
Attending: EMERGENCY MEDICINE
Payer: MEDICARE

## 2017-11-02 DIAGNOSIS — R51.9 HEADACHE, UNSPECIFIED HEADACHE TYPE: Primary | ICD-10-CM

## 2017-11-02 DIAGNOSIS — I25.10 CAD, MULTIPLE VESSEL: ICD-10-CM

## 2017-11-02 DIAGNOSIS — R07.9 ACUTE CHEST PAIN: ICD-10-CM

## 2017-11-02 PROCEDURE — 71010 XR CHEST PORT: CPT

## 2017-11-02 PROCEDURE — 85025 COMPLETE CBC W/AUTO DIFF WBC: CPT | Performed by: EMERGENCY MEDICINE

## 2017-11-02 PROCEDURE — 70450 CT HEAD/BRAIN W/O DYE: CPT

## 2017-11-02 PROCEDURE — 80053 COMPREHEN METABOLIC PANEL: CPT | Performed by: EMERGENCY MEDICINE

## 2017-11-02 PROCEDURE — 85652 RBC SED RATE AUTOMATED: CPT | Performed by: EMERGENCY MEDICINE

## 2017-11-02 PROCEDURE — 99285 EMERGENCY DEPT VISIT HI MDM: CPT

## 2017-11-02 PROCEDURE — 82550 ASSAY OF CK (CPK): CPT | Performed by: EMERGENCY MEDICINE

## 2017-11-02 PROCEDURE — 74011250637 HC RX REV CODE- 250/637: Performed by: EMERGENCY MEDICINE

## 2017-11-02 PROCEDURE — 93005 ELECTROCARDIOGRAM TRACING: CPT

## 2017-11-02 RX ORDER — ACETAMINOPHEN 500 MG
1000 TABLET ORAL
Status: COMPLETED | OUTPATIENT
Start: 2017-11-02 | End: 2017-11-02

## 2017-11-02 RX ORDER — GUAIFENESIN 100 MG/5ML
81 LIQUID (ML) ORAL
Status: COMPLETED | OUTPATIENT
Start: 2017-11-02 | End: 2017-11-02

## 2017-11-02 RX ADMIN — ASPIRIN 81 MG 81 MG: 81 TABLET ORAL at 23:49

## 2017-11-02 RX ADMIN — ACETAMINOPHEN 1000 MG: 500 TABLET ORAL at 23:49

## 2017-11-03 VITALS
BODY MASS INDEX: 38.09 KG/M2 | HEIGHT: 63 IN | RESPIRATION RATE: 18 BRPM | TEMPERATURE: 97.7 F | WEIGHT: 215 LBS | DIASTOLIC BLOOD PRESSURE: 78 MMHG | OXYGEN SATURATION: 96 % | HEART RATE: 47 BPM | SYSTOLIC BLOOD PRESSURE: 163 MMHG

## 2017-11-03 LAB
ALBUMIN SERPL-MCNC: 2.9 G/DL (ref 3.4–5)
ALBUMIN/GLOB SERPL: 0.9 {RATIO} (ref 0.8–1.7)
ALP SERPL-CCNC: 104 U/L (ref 45–117)
ALT SERPL-CCNC: 16 U/L (ref 13–56)
ANION GAP SERPL CALC-SCNC: 9 MMOL/L (ref 3–18)
AST SERPL-CCNC: 18 U/L (ref 15–37)
ATRIAL RATE: 68 BPM
ATRIAL RATE: 79 BPM
BASOPHILS # BLD: 0 K/UL (ref 0–0.06)
BASOPHILS NFR BLD: 0 % (ref 0–2)
BILIRUB SERPL-MCNC: 0.3 MG/DL (ref 0.2–1)
BUN SERPL-MCNC: 20 MG/DL (ref 7–18)
BUN/CREAT SERPL: 25 (ref 12–20)
CALCIUM SERPL-MCNC: 8.3 MG/DL (ref 8.5–10.1)
CALCULATED P AXIS, ECG09: 84 DEGREES
CALCULATED P AXIS, ECG09: 86 DEGREES
CALCULATED R AXIS, ECG10: -34 DEGREES
CALCULATED R AXIS, ECG10: -39 DEGREES
CALCULATED T AXIS, ECG11: 27 DEGREES
CALCULATED T AXIS, ECG11: 38 DEGREES
CHLORIDE SERPL-SCNC: 107 MMOL/L (ref 100–108)
CK MB CFR SERPL CALC: NORMAL % (ref 0–4)
CK MB SERPL-MCNC: <1 NG/ML (ref 5–25)
CK SERPL-CCNC: 46 U/L (ref 26–192)
CO2 SERPL-SCNC: 27 MMOL/L (ref 21–32)
CREAT SERPL-MCNC: 0.79 MG/DL (ref 0.6–1.3)
DIAGNOSIS, 93000: NORMAL
DIAGNOSIS, 93000: NORMAL
DIFFERENTIAL METHOD BLD: ABNORMAL
EOSINOPHIL # BLD: 0.1 K/UL (ref 0–0.4)
EOSINOPHIL NFR BLD: 1 % (ref 0–5)
ERYTHROCYTE [DISTWIDTH] IN BLOOD BY AUTOMATED COUNT: 13 % (ref 11.6–14.5)
ERYTHROCYTE [SEDIMENTATION RATE] IN BLOOD: 42 MM/HR (ref 0–30)
GLOBULIN SER CALC-MCNC: 3.4 G/DL (ref 2–4)
GLUCOSE SERPL-MCNC: 155 MG/DL (ref 74–99)
HCT VFR BLD AUTO: 43.4 % (ref 35–45)
HGB BLD-MCNC: 14.7 G/DL (ref 12–16)
LYMPHOCYTES # BLD: 1.4 K/UL (ref 0.9–3.6)
LYMPHOCYTES NFR BLD: 20 % (ref 21–52)
MCH RBC QN AUTO: 30.1 PG (ref 24–34)
MCHC RBC AUTO-ENTMCNC: 33.9 G/DL (ref 31–37)
MCV RBC AUTO: 88.8 FL (ref 74–97)
MONOCYTES # BLD: 0.7 K/UL (ref 0.05–1.2)
MONOCYTES NFR BLD: 10 % (ref 3–10)
NEUTS SEG # BLD: 4.8 K/UL (ref 1.8–8)
NEUTS SEG NFR BLD: 69 % (ref 40–73)
PLATELET # BLD AUTO: 121 K/UL (ref 135–420)
PMV BLD AUTO: 11.2 FL (ref 9.2–11.8)
POTASSIUM SERPL-SCNC: 4.2 MMOL/L (ref 3.5–5.5)
PROT SERPL-MCNC: 6.3 G/DL (ref 6.4–8.2)
Q-T INTERVAL, ECG07: 456 MS
Q-T INTERVAL, ECG07: 496 MS
QRS DURATION, ECG06: 122 MS
QRS DURATION, ECG06: 126 MS
QTC CALCULATION (BEZET), ECG08: 434 MS
QTC CALCULATION (BEZET), ECG08: 466 MS
RBC # BLD AUTO: 4.89 M/UL (ref 4.2–5.3)
SODIUM SERPL-SCNC: 143 MMOL/L (ref 136–145)
TROPONIN I BLD-MCNC: <0.04 NG/ML (ref 0–0.08)
TROPONIN I SERPL-MCNC: <0.02 NG/ML (ref 0–0.04)
VENTRICULAR RATE, ECG03: 46 BPM
VENTRICULAR RATE, ECG03: 63 BPM
WBC # BLD AUTO: 6.9 K/UL (ref 4.6–13.2)

## 2017-11-03 PROCEDURE — 84484 ASSAY OF TROPONIN QUANT: CPT

## 2017-11-03 PROCEDURE — 93005 ELECTROCARDIOGRAM TRACING: CPT

## 2017-11-03 RX ORDER — ACETAMINOPHEN 500 MG
1000 TABLET ORAL
Qty: 40 TAB | Refills: 0 | Status: SHIPPED | OUTPATIENT
Start: 2017-11-03 | End: 2018-10-29

## 2017-11-03 NOTE — ED PROVIDER NOTES
HPI Comments: Gonzalo Hanley is a 81 y.o. Female with h/o cad, stent 2013 with c/o left sided headache for last 3 days, constant, throbbing, pressure with no acute visual changes, nvd, neck pain, numbness, tingling, weakness. No trauma. Not thunderclap with onset. No sig exacerbating factors. Noted to have diff talking just prior to arrival tonight which lasted for few minutes then developed left sided chest pressure, left jaw pain with slight sweat, sob as well which has since resolved after 15 min. No syncope, vomiting. Similar to previous mi. Has been compliant with aspirin. No recent sob, bagley, exertional cp. No ho cva in past. No gait diff    The history is provided by the patient and medical records. Past Medical History:   Diagnosis Date    Arthritis     Bilateral cataracts     sees Dr. Nicki Underwood    COPD     Coronary artery disease     Cx - 2.75 x 33mm XIENCE 10/13     Diabetes     Dyslipidemia     Hypertension     MI (myocardial infarction)     Noncompliance     Obesity        Past Surgical History:   Procedure Laterality Date    HX CORONARY STENT PLACEMENT  2013         Family History:   Problem Relation Age of Onset    Arthritis-osteo Mother     Heart Disease Mother     Alcohol abuse Neg Hx     Asthma Neg Hx     Bleeding Prob Neg Hx     Cancer Neg Hx     Diabetes Neg Hx     Elevated Lipids Neg Hx     Headache Neg Hx     Hypertension Neg Hx     Lung Disease Neg Hx     Migraines Neg Hx     Psychiatric Disorder Neg Hx     Stroke Neg Hx     Mental Retardation Neg Hx        Social History     Social History    Marital status:      Spouse name: N/A    Number of children: N/A    Years of education: N/A     Occupational History    Not on file.      Social History Main Topics    Smoking status: Former Smoker     Packs/day: 1.00     Years: 54.00     Start date: 2/10/2014    Smokeless tobacco: Never Used      Comment: pt states she uses e-cigs to stop smoking    Alcohol use No    Drug use: No    Sexual activity: Not Currently     Other Topics Concern    Not on file     Social History Narrative         ALLERGIES: Review of patient's allergies indicates no known allergies. Review of Systems   Constitutional: Negative for fever. HENT: Negative for trouble swallowing. Eyes: Negative for visual disturbance (chronic issues but nothing new). Respiratory: Negative for cough. Cardiovascular: Positive for chest pain (none at time of arrival during my initial assessment). Negative for leg swelling. Gastrointestinal: Negative for abdominal pain. Endocrine: Negative for polyuria. Genitourinary: Negative for difficulty urinating. Musculoskeletal: Negative for gait problem. Skin: Negative for rash. Allergic/Immunologic: Negative for immunocompromised state. Neurological: Positive for speech difficulty (resolved) and headaches. Negative for dizziness. Psychiatric/Behavioral: Positive for sleep disturbance. Vitals:    11/03/17 0130 11/03/17 0200 11/03/17 0230 11/03/17 0245   BP: 159/74 157/79 161/78 (!) 173/123   Pulse: (!) 48 68 (!) 47 (!) 53   Resp: 18 23 17 12   Temp:       SpO2: 97% 99% 96% 98%   Weight:       Height:                Physical Exam   Constitutional: She is oriented to person, place, and time. She appears well-developed and well-nourished. No distress. HENT:   Head: Normocephalic and atraumatic. Right Ear: External ear normal.   Left Ear: External ear normal.   Nose: Nose normal.   Mouth/Throat: Uvula is midline, oropharynx is clear and moist and mucous membranes are normal.   No ttp over temporal area on left. Pt points to area above left orbital as area of headache   Eyes: Conjunctivae are normal. No scleral icterus. Neck: Neck supple. Cardiovascular: Normal rate, regular rhythm, normal heart sounds and intact distal pulses. Pulmonary/Chest: Effort normal and breath sounds normal.   Abdominal: Soft. There is no tenderness. Musculoskeletal: She exhibits no edema. Neurological: She is alert and oriented to person, place, and time. No cranial nerve deficit (3-12) or sensory deficit (gross touch intact). She exhibits normal muscle tone. Coordination (fn) and gait normal. GCS eye subscore is 4. GCS verbal subscore is 5. GCS motor subscore is 6. Skin: Skin is warm and dry. She is not diaphoretic. Psychiatric: Her behavior is normal.   Nursing note and vitals reviewed.        [facility]  ED Course       Procedures    Vitals:  Patient Vitals for the past 12 hrs:   Temp Pulse Resp BP SpO2   11/03/17 0245 - (!) 53 12 (!) 173/123 98 %   11/03/17 0230 - (!) 47 17 161/78 96 %   11/03/17 0200 - 68 23 157/79 99 %   11/03/17 0130 - (!) 48 18 159/74 97 %   11/03/17 0100 - 66 22 166/62 96 %   11/03/17 0030 - (!) 56 19 149/50 97 %   11/02/17 2351 97.7 °F (36.5 °C) 69 17 155/71 97 %         Medications ordered:   Medications   acetaminophen (TYLENOL) tablet 1,000 mg (1,000 mg Oral Given 11/2/17 2349)   aspirin chewable tablet 81 mg (81 mg Oral Given 11/2/17 2349)         Lab findings:  Recent Results (from the past 12 hour(s))   EKG, 12 LEAD, INITIAL    Collection Time: 11/02/17 11:29 PM   Result Value Ref Range    Ventricular Rate 63 BPM    Atrial Rate 79 BPM    QRS Duration 122 ms    Q-T Interval 456 ms    QTC Calculation (Bezet) 466 ms    Calculated P Axis 84 degrees    Calculated R Axis -39 degrees    Calculated T Axis 38 degrees    Diagnosis       Sinus rhythm with 2nd degree AV block (Mobitz I)  Left axis deviation  Right bundle branch block  Minimal voltage criteria for LVH, may be normal variant  Abnormal ECG  When compared with ECG of 14-JUL-2017 23:39,  Sinus rhythm is now with 2nd degree AV block (Mobitz I)     CBC WITH AUTOMATED DIFF    Collection Time: 11/02/17 11:30 PM   Result Value Ref Range    WBC 6.9 4.6 - 13.2 K/uL    RBC 4.89 4.20 - 5.30 M/uL    HGB 14.7 12.0 - 16.0 g/dL    HCT 43.4 35.0 - 45.0 %    MCV 88.8 74.0 - 97.0 FL    MCH 30.1 24.0 - 34.0 PG    MCHC 33.9 31.0 - 37.0 g/dL    RDW 13.0 11.6 - 14.5 %    PLATELET 114 (L) 875 - 420 K/uL    MPV 11.2 9.2 - 11.8 FL    NEUTROPHILS 69 40 - 73 %    LYMPHOCYTES 20 (L) 21 - 52 %    MONOCYTES 10 3 - 10 %    EOSINOPHILS 1 0 - 5 %    BASOPHILS 0 0 - 2 %    ABS. NEUTROPHILS 4.8 1.8 - 8.0 K/UL    ABS. LYMPHOCYTES 1.4 0.9 - 3.6 K/UL    ABS. MONOCYTES 0.7 0.05 - 1.2 K/UL    ABS. EOSINOPHILS 0.1 0.0 - 0.4 K/UL    ABS. BASOPHILS 0.0 0.0 - 0.06 K/UL    DF AUTOMATED     METABOLIC PANEL, COMPREHENSIVE    Collection Time: 11/02/17 11:30 PM   Result Value Ref Range    Sodium 143 136 - 145 mmol/L    Potassium 4.2 3.5 - 5.5 mmol/L    Chloride 107 100 - 108 mmol/L    CO2 27 21 - 32 mmol/L    Anion gap 9 3.0 - 18 mmol/L    Glucose 155 (H) 74 - 99 mg/dL    BUN 20 (H) 7.0 - 18 MG/DL    Creatinine 0.79 0.6 - 1.3 MG/DL    BUN/Creatinine ratio 25 (H) 12 - 20      GFR est AA >60 >60 ml/min/1.73m2    GFR est non-AA >60 >60 ml/min/1.73m2    Calcium 8.3 (L) 8.5 - 10.1 MG/DL    Bilirubin, total 0.3 0.2 - 1.0 MG/DL    ALT (SGPT) 16 13 - 56 U/L    AST (SGOT) 18 15 - 37 U/L    Alk.  phosphatase 104 45 - 117 U/L    Protein, total 6.3 (L) 6.4 - 8.2 g/dL    Albumin 2.9 (L) 3.4 - 5.0 g/dL    Globulin 3.4 2.0 - 4.0 g/dL    A-G Ratio 0.9 0.8 - 1.7     CARDIAC PANEL,(CK, CKMB & TROPONIN)    Collection Time: 11/02/17 11:30 PM   Result Value Ref Range    CK 46 26 - 192 U/L    CK - MB <1.0 <3.6 ng/ml    CK-MB Index  0.0 - 4.0 %     CALCULATION NOT PERFORMED WHEN RESULT IS BELOW LINEAR LIMIT    Troponin-I, Qt. <0.02 0.0 - 0.045 NG/ML   SED RATE (ESR)    Collection Time: 11/02/17 11:30 PM   Result Value Ref Range    Sed rate, automated 42 (H) 0 - 30 mm/hr   POC TROPONIN-I    Collection Time: 11/03/17  2:46 AM   Result Value Ref Range    Troponin-I (POC) <0.04 0.00 - 0.08 ng/mL   EKG, 12 LEAD, SUBSEQUENT    Collection Time: 11/03/17  2:52 AM   Result Value Ref Range    Ventricular Rate 46 BPM    Atrial Rate 68 BPM    QRS Duration 126 ms    Q-T Interval 496 ms    QTC Calculation (Bezet) 434 ms    Calculated P Axis 86 degrees    Calculated R Axis -34 degrees    Calculated T Axis 27 degrees    Diagnosis       Sinus rhythm with 2nd degree AV block (Mobitz I)  Left axis deviation  Right bundle branch block  Minimal voltage criteria for LVH, may be normal variant  Abnormal ECG  When compared with ECG of 02-NOV-2017 23:29,  No significant change was found         EKG interpretation by ED Physician:  Sinus with 2nd degree mobitz 1. Rbbb. No acute st tw changes  Rate 63, qtc 466  Mobitz 1 now present    Cardiac monitor: nl rate, slight irreg rhythm. No ectopy    Prehosp: sinus with mobitz 1, rbbb. No acute st tw changes  Rate 70, qtc 484   repeat: sinus with mobitz 1. Rbbb. No acute st tw chagnes  Rate 46, qtc 434  Not sig changed      X-Ray, CT or other radiology findings or impressions:  CT HEAD WO CONT    (Results Pending)   XR CHEST PORT    (Results Pending)   ct head with nothing acute per prelim report  cxr with nap per my interp    Progress notes, Consult notes or additional Procedure notes: Had teleneuro see pt, dr Alejandro Santana who reviewed labs imaging, does not feel need for further work up at this time  Feeling better. Serial trop below detectable level. No recurrent cp  I have discussed with patient and/or family/sig other the results, interpretation of any imaging if performed, suspected diagnosis and treatment plan to include instructions regarding the diagnoses listed to which understanding was expressed with all questions answered    Reevaluation of patient:   stable    Disposition:  Diagnosis:   1. Headache, unspecified headache type    2. Acute chest pain    3. CAD, multiple vessel        Disposition: home      Follow-up Information     Follow up With Details Comments Contact Info    Trina Medina MD Call office this morning to arrange follow up in next 5 days.   [email or web address]      Shawn Judd MD Call office this morning to arrange recheck in office within next 5 days [address] [place] [address] [address]  [phone]      [facility] EMERGENCY DEPT  If symptoms worsen [address]  [phone]            Patient's Medications   Start Taking    ACETAMINOPHEN (TYLENOL EXTRA STRENGTH) 500 MG TABLET    Take 2 Tabs by mouth every six (6) hours as needed for Pain. Continue Taking    ASPIRIN 81 MG CHEWABLE TABLET    Take 1 Tab by mouth daily. BLOOD-GLUCOSE METER MONITORING KIT    Use tid    GABAPENTIN (NEURONTIN) 300 MG CAPSULE    Take 1 capsule by mouth nightly. GLUCOSE 4 GRAM CHEWABLE TABLET    Take 4 tablets by mouth as needed (hypoglcemia). GLUCOSE BLOOD VI TEST STRIPS (ASCENSIA AUTODISC VI, ONE TOUCH ULTRA TEST VI) STRIP    by Does Not Apply route See Admin Instructions. Use tid    INSULIN ASPART (NOVOLOG FLEXPEN) 100 UNIT/ML FLEXPEN    3 Units by SubCUTAneous route Before breakfast, lunch, and dinner. INSULIN GLARGINE (LANTUS SOLOSTAR) 100 UNIT/ML (3 ML) PEN    10 Units by SubCUTAneous route daily. INSULIN NEEDLES, DISPOSABLE, (LITE TOUCH INSULIN PEN NEEDLES) 31 X 1/4 \" NDLE    1 each by Does Not Apply route five (5) times daily. INSULIN NEEDLES, DISPOSABLE, 31 X 5/16 \" NDLE    Use bid    INSULIN NEEDLES, DISPOSABLE, 31 X 5/16 \" NDLE    1qd    LANCETS MISC    Use tid    NITROGLYCERIN (NITROSTAT) 0.4 MG SL TABLET    1 Tab by SubLINGual route every five (5) minutes as needed for Chest Pain.    These Medications have changed    No medications on file   Stop Taking    No medications on file

## 2018-03-19 ENCOUNTER — APPOINTMENT (OUTPATIENT)
Dept: GENERAL RADIOLOGY | Age: 73
DRG: 065 | End: 2018-03-19
Attending: EMERGENCY MEDICINE
Payer: MEDICARE

## 2018-03-19 ENCOUNTER — HOSPITAL ENCOUNTER (INPATIENT)
Age: 73
LOS: 1 days | Discharge: HOME HEALTH | DRG: 065 | End: 2018-03-21
Attending: EMERGENCY MEDICINE | Admitting: HOSPITALIST
Payer: MEDICARE

## 2018-03-19 ENCOUNTER — APPOINTMENT (OUTPATIENT)
Dept: CT IMAGING | Age: 73
DRG: 065 | End: 2018-03-19
Attending: EMERGENCY MEDICINE
Payer: MEDICARE

## 2018-03-19 DIAGNOSIS — G45.9 TRANSIENT CEREBRAL ISCHEMIA, UNSPECIFIED TYPE: Primary | ICD-10-CM

## 2018-03-19 LAB
ALBUMIN SERPL-MCNC: 3 G/DL (ref 3.4–5)
ALBUMIN/GLOB SERPL: 0.8 {RATIO} (ref 0.8–1.7)
ALP SERPL-CCNC: 125 U/L (ref 45–117)
ALT SERPL-CCNC: 19 U/L (ref 13–56)
ANION GAP BLD CALC-SCNC: 14 MMOL/L (ref 10–20)
ANION GAP SERPL CALC-SCNC: 9 MMOL/L (ref 3–18)
ASPIRIN TEST, ASPIRN: 415 ARU (ref 620–672)
AST SERPL-CCNC: 19 U/L (ref 15–37)
BASOPHILS # BLD: 0 K/UL (ref 0–0.06)
BASOPHILS NFR BLD: 0 % (ref 0–2)
BILIRUB SERPL-MCNC: 0.3 MG/DL (ref 0.2–1)
BUN BLD-MCNC: 27 MG/DL (ref 7–18)
BUN SERPL-MCNC: 22 MG/DL (ref 7–18)
BUN/CREAT SERPL: 19 (ref 12–20)
CA-I BLD-MCNC: 1.08 MMOL/L (ref 1.12–1.32)
CALCIUM SERPL-MCNC: 8.6 MG/DL (ref 8.5–10.1)
CHLORIDE BLD-SCNC: 101 MMOL/L (ref 100–108)
CHLORIDE SERPL-SCNC: 102 MMOL/L (ref 100–108)
CO2 BLD-SCNC: 29 MMOL/L (ref 19–24)
CO2 SERPL-SCNC: 28 MMOL/L (ref 21–32)
CREAT SERPL-MCNC: 1.16 MG/DL (ref 0.6–1.3)
CREAT UR-MCNC: 1.1 MG/DL (ref 0.6–1.3)
DIFFERENTIAL METHOD BLD: ABNORMAL
EOSINOPHIL # BLD: 0 K/UL (ref 0–0.4)
EOSINOPHIL NFR BLD: 1 % (ref 0–5)
ERYTHROCYTE [DISTWIDTH] IN BLOOD BY AUTOMATED COUNT: 12.5 % (ref 11.6–14.5)
FIBRINOGEN PPP-MCNC: 706 MG/DL (ref 210–451)
GLOBULIN SER CALC-MCNC: 4 G/DL (ref 2–4)
GLUCOSE BLD STRIP.AUTO-MCNC: 279 MG/DL (ref 74–106)
GLUCOSE SERPL-MCNC: 304 MG/DL (ref 74–99)
HCT VFR BLD AUTO: 44.1 % (ref 35–45)
HCT VFR BLD CALC: 39 % (ref 36–49)
HGB BLD-MCNC: 13.3 G/DL (ref 12–16)
HGB BLD-MCNC: 15.1 G/DL (ref 12–16)
INR PPP: 1.1 (ref 0.8–1.2)
LYMPHOCYTES # BLD: 1.2 K/UL (ref 0.9–3.6)
LYMPHOCYTES NFR BLD: 20 % (ref 21–52)
MCH RBC QN AUTO: 29.8 PG (ref 24–34)
MCHC RBC AUTO-ENTMCNC: 34.2 G/DL (ref 31–37)
MCV RBC AUTO: 87 FL (ref 74–97)
MONOCYTES # BLD: 0.6 K/UL (ref 0.05–1.2)
MONOCYTES NFR BLD: 10 % (ref 3–10)
NEUTS SEG # BLD: 4.2 K/UL (ref 1.8–8)
NEUTS SEG NFR BLD: 69 % (ref 40–73)
PLATELET # BLD AUTO: 160 K/UL (ref 135–420)
PMV BLD AUTO: 11.4 FL (ref 9.2–11.8)
POTASSIUM BLD-SCNC: 4.3 MMOL/L (ref 3.5–5.5)
POTASSIUM SERPL-SCNC: 4.3 MMOL/L (ref 3.5–5.5)
PROT SERPL-MCNC: 7 G/DL (ref 6.4–8.2)
PROTHROMBIN TIME: 13.5 SEC (ref 11.5–15.2)
RBC # BLD AUTO: 5.07 M/UL (ref 4.2–5.3)
SODIUM BLD-SCNC: 139 MMOL/L (ref 136–145)
SODIUM SERPL-SCNC: 139 MMOL/L (ref 136–145)
THROMBIN TIME: 16.7 SECS (ref 13.8–18.2)
TROPONIN I SERPL-MCNC: <0.02 NG/ML (ref 0–0.04)
WBC # BLD AUTO: 6.1 K/UL (ref 4.6–13.2)

## 2018-03-19 PROCEDURE — 80047 BASIC METABLC PNL IONIZED CA: CPT

## 2018-03-19 PROCEDURE — 85610 PROTHROMBIN TIME: CPT | Performed by: EMERGENCY MEDICINE

## 2018-03-19 PROCEDURE — 85384 FIBRINOGEN ACTIVITY: CPT | Performed by: EMERGENCY MEDICINE

## 2018-03-19 PROCEDURE — 93005 ELECTROCARDIOGRAM TRACING: CPT

## 2018-03-19 PROCEDURE — 70498 CT ANGIOGRAPHY NECK: CPT

## 2018-03-19 PROCEDURE — 70450 CT HEAD/BRAIN W/O DYE: CPT

## 2018-03-19 PROCEDURE — 74011250637 HC RX REV CODE- 250/637: Performed by: EMERGENCY MEDICINE

## 2018-03-19 PROCEDURE — 74011636637 HC RX REV CODE- 636/637: Performed by: EMERGENCY MEDICINE

## 2018-03-19 PROCEDURE — 94762 N-INVAS EAR/PLS OXIMTRY CONT: CPT

## 2018-03-19 PROCEDURE — 80053 COMPREHEN METABOLIC PANEL: CPT | Performed by: EMERGENCY MEDICINE

## 2018-03-19 PROCEDURE — 84484 ASSAY OF TROPONIN QUANT: CPT | Performed by: EMERGENCY MEDICINE

## 2018-03-19 PROCEDURE — 85025 COMPLETE CBC W/AUTO DIFF WBC: CPT | Performed by: EMERGENCY MEDICINE

## 2018-03-19 PROCEDURE — 74011250636 HC RX REV CODE- 250/636: Performed by: EMERGENCY MEDICINE

## 2018-03-19 PROCEDURE — 86900 BLOOD TYPING SEROLOGIC ABO: CPT | Performed by: EMERGENCY MEDICINE

## 2018-03-19 PROCEDURE — 85576 BLOOD PLATELET AGGREGATION: CPT | Performed by: EMERGENCY MEDICINE

## 2018-03-19 PROCEDURE — 71045 X-RAY EXAM CHEST 1 VIEW: CPT

## 2018-03-19 PROCEDURE — 99285 EMERGENCY DEPT VISIT HI MDM: CPT

## 2018-03-19 PROCEDURE — 96374 THER/PROPH/DIAG INJ IV PUSH: CPT

## 2018-03-19 PROCEDURE — 74011000258 HC RX REV CODE- 258: Performed by: EMERGENCY MEDICINE

## 2018-03-19 PROCEDURE — 85670 THROMBIN TIME PLASMA: CPT | Performed by: EMERGENCY MEDICINE

## 2018-03-19 RX ORDER — SODIUM CHLORIDE 9 MG/ML
75 INJECTION, SOLUTION INTRAVENOUS CONTINUOUS
Status: DISCONTINUED | OUTPATIENT
Start: 2018-03-19 | End: 2018-03-21 | Stop reason: HOSPADM

## 2018-03-19 RX ORDER — SODIUM CHLORIDE 9 MG/ML
100 INJECTION, SOLUTION INTRAVENOUS ONCE
Status: COMPLETED | OUTPATIENT
Start: 2018-03-19 | End: 2018-03-19

## 2018-03-19 RX ORDER — ATORVASTATIN CALCIUM 40 MG/1
40 TABLET, FILM COATED ORAL
Status: COMPLETED | OUTPATIENT
Start: 2018-03-19 | End: 2018-03-19

## 2018-03-19 RX ADMIN — ATORVASTATIN CALCIUM 40 MG: 40 TABLET, FILM COATED ORAL at 23:46

## 2018-03-19 RX ADMIN — SODIUM CHLORIDE 75 ML/HR: 900 INJECTION, SOLUTION INTRAVENOUS at 23:49

## 2018-03-19 RX ADMIN — INSULIN HUMAN 4 UNITS: 100 INJECTION, SOLUTION PARENTERAL at 23:46

## 2018-03-19 RX ADMIN — SODIUM CHLORIDE 100 ML: 900 INJECTION, SOLUTION INTRAVENOUS at 22:58

## 2018-03-19 NOTE — IP AVS SNAPSHOT
[address] [address] 
[phone] Patient: Helen Hernandez MRN: [id number] A check casey indicates which time of day the medication should be taken. My Medications START taking these medications Instructions Each Dose to Equal  
 Morning Noon Evening Bedtime  
 aspirin 325 mg tablet Commonly known as:  ASPIRIN Start taking on:  3/22/2018 Replaces:  aspirin 81 mg chewable tablet Your last dose was: Your next dose is: Take 1 Tab by mouth daily. 325 mg  
    
   
   
   
  
 atorvastatin 80 mg tablet Commonly known as:  LIPITOR Your last dose was: Your next dose is: Take 1 Tab by mouth nightly. 80 mg CHANGE how you take these medications Instructions Each Dose to Equal  
 Morning Noon Evening Bedtime  
 insulin glargine 100 unit/mL (3 mL) Inpn Commonly known as:  LANTUS SOLOSTAR U-100 INSULIN What changed:  how much to take Your last dose was: Your next dose is:    
   
   
 12 Units by SubCUTAneous route daily. 12 Units CONTINUE taking these medications Instructions Each Dose to Equal  
 Morning Noon Evening Bedtime  
 acetaminophen 500 mg tablet Commonly known as:  80 [address] Nunes [facility] Your last dose was: Your next dose is: Take 2 Tabs by mouth every six (6) hours as needed for Pain. 1000 mg Blood-Glucose Meter monitoring kit Your last dose was: Your next dose is:    
   
   
 Use tid  
     
   
   
   
  
 gabapentin 300 mg capsule Commonly known as:  NEURONTIN Your last dose was: Your next dose is: Take 1 capsule by mouth nightly. 300 mg  
    
   
   
   
  
 glucose 4 gram chewable tablet Your last dose was: Your next dose is: Take 4 tablets by mouth as needed (hypoglcemia). 16 g  
    
   
   
   
  
 glucose blood VI test strips strip Commonly known as:  ASCENSIA AUTODISC VI, ONE TOUCH ULTRA TEST VI Your last dose was: Your next dose is:    
   
   
 by Does Not Apply route See Admin Instructions. Use tid  
     
   
   
   
  
 insulin aspart U-100 100 unit/mL Inpn Commonly known as:  NovoLOG Flexpen U-100 Insulin Your last dose was: Your next dose is:    
   
   
 3 Units by SubCUTAneous route Before breakfast, lunch, and dinner. 3 Units * Insulin Needles (Disposable) 31 gauge x 5/16\" Ndle Your last dose was: Your next dose is:    
   
   
 Use bid * Insulin Needles (Disposable) 31 gauge x 5/16\" Ndle Your last dose was: Your next dose is:    
   
   
 1qd * Insulin Needles (Disposable) 31 gauge x 1/4\" Ndle Commonly known as:  LITE TOUCH INSULIN PEN NEEDLES Your last dose was: Your next dose is:    
   
   
 1 each by Does Not Apply route five (5) times daily. 1 Each Lancets Misc Your last dose was: Your next dose is:    
   
   
 Use tid  
     
   
   
   
  
 nitroglycerin 0.4 mg SL tablet Commonly known as:  NITROSTAT Your last dose was: Your next dose is:    
   
   
 1 Tab by SubLINGual route every five (5) minutes as needed for Chest Pain. 0.4 mg  
    
   
   
   
  
 * Notice: This list has 3 medication(s) that are the same as other medications prescribed for you. Read the directions carefully, and ask your doctor or other care provider to review them with you. STOP taking these medications   
 aspirin 81 mg chewable tablet Replaced by:  aspirin 325 mg tablet Where to Get Your Medications These medications were sent to [address] [place] [address] [facility] [place], [place] - [address], [address] [facility] [address] Phone:  [phone]  
  aspirin 325 mg tablet  
 atorvastatin 80 mg tablet  
 insulin aspart U-100 100 unit/mL Inpn  
 insulin glargine 100 unit/mL (3 mL) Inpn

## 2018-03-19 NOTE — IP AVS SNAPSHOT
[address] [address] 
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 My Medications START taking these medications Instructions Each Dose to Equal  
 Morning Noon Evening Bedtime  
 aspirin 325 mg tablet Commonly known as:  ASPIRIN Start taking on:  3/22/2018 Replaces:  aspirin 81 mg chewable tablet Your last dose was: Your next dose is: Take 1 Tab by mouth daily. 325 mg  
    
   
   
   
  
 atorvastatin 80 mg tablet Commonly known as:  LIPITOR Your last dose was: Your next dose is: Take 1 Tab by mouth nightly. 80 mg CHANGE how you take these medications Instructions Each Dose to Equal  
 Morning Noon Evening Bedtime  
 insulin glargine 100 unit/mL (3 mL) Inpn Commonly known as:  LANTUS SOLOSTAR U-100 INSULIN What changed:  how much to take Your last dose was: Your next dose is:    
   
   
 12 Units by SubCUTAneous route daily. 12 Units CONTINUE taking these medications Instructions Each Dose to Equal  
 Morning Noon Evening Bedtime  
 acetaminophen 500 mg tablet Commonly known as:  [address] [facility] Your last dose was: Your next dose is: Take 2 Tabs by mouth every six (6) hours as needed for Pain. 1000 mg Blood-Glucose Meter monitoring kit Your last dose was: Your next dose is:    
   
   
 Use tid  
     
   
   
   
  
 gabapentin 300 mg capsule Commonly known as:  NEURONTIN Your last dose was: Your next dose is: Take 1 capsule by mouth nightly. 300 mg  
    
   
   
   
  
 glucose 4 gram chewable tablet Your last dose was: Your next dose is: Take 4 tablets by mouth as needed (hypoglcemia). 16 g  
    
   
   
   
  
 glucose blood VI test strips strip Commonly known as:  ASCENSIA AUTODISC VI, ONE TOUCH ULTRA TEST VI Your last dose was: Your next dose is:    
   
   
 by Does Not Apply route See Admin Instructions. Use tid  
     
   
   
   
  
 insulin aspart U-100 100 unit/mL Inpn Commonly known as:  NovoLOG Flexpen U-100 Insulin Your last dose was: Your next dose is:    
   
   
 3 Units by SubCUTAneous route Before breakfast, lunch, and dinner. 3 Units * Insulin Needles (Disposable) 31 gauge x 5/16\" Ndle Your last dose was: Your next dose is:    
   
   
 Use bid * Insulin Needles (Disposable) 31 gauge x 5/16\" Ndle Your last dose was: Your next dose is:    
   
   
 1qd * Insulin Needles (Disposable) 31 gauge x 1/4\" Ndle Commonly known as:  LITE TOUCH INSULIN PEN NEEDLES Your last dose was: Your next dose is:    
   
   
 1 each by Does Not Apply route five (5) times daily. 1 Each Lancets Misc Your last dose was: Your next dose is:    
   
   
 Use tid  
     
   
   
   
  
 nitroglycerin 0.4 mg SL tablet Commonly known as:  NITROSTAT Your last dose was: Your next dose is:    
   
   
 1 Tab by SubLINGual route every five (5) minutes as needed for Chest Pain. 0.4 mg  
    
   
   
   
  
 * Notice: This list has 3 medication(s) that are the same as other medications prescribed for you. Read the directions carefully, and ask your doctor or other care provider to review them with you. STOP taking these medications   
 aspirin 81 mg chewable tablet Replaced by:  aspirin 325 mg tablet Where to Get Your Medications These medications were sent to [address] [place] [address]  AT . [address]  [address] [place] [address], [address] [place] [id number] Phone:  [phone]  
  aspirin 325 mg tablet  
 atorvastatin 80 mg tablet  
 insulin aspart U-100 100 unit/mL Inpn  
 insulin glargine 100 unit/mL (3 mL) Inpn Discharge Instructions DISCHARGE SUMMARY from Nurse PATIENT INSTRUCTIONS: 
 
 
F-face looks uneven A-arms unable to move or move unevenly S-speech slurred or non-existent T-time-call 911 as soon as signs and symptoms begin-DO NOT go Back to bed or wait to see if you get better-TIME IS BRAIN. Warning Signs of HEART ATTACK Call 911 if you have these symptoms: 
? Chest discomfort. Most heart attacks involve discomfort in the center of the chest that lasts more than a few minutes, or that goes away and comes back. It can feel like uncomfortable pressure, squeezing, fullness, or pain. ? Discomfort in other areas of the upper body.  Symptoms can include pain or discomfort in one or both arms, the back, neck, jaw, or stomach. ? Shortness of breath with or without chest discomfort. ? Other signs may include breaking out in a cold sweat, nausea, or lightheadedness. Don't wait more than five minutes to call [address]! Fast action can save your life. Calling 911 is almost always the fastest way to get lifesaving treatment. Emergency Medical Services staff can begin treatment when they arrive  up to an hour sooner than if someone gets to the hospital by car. The discharge information has been reviewed with the patient. The patient verbalized understanding. Discharge medications reviewed with the patient and appropriate educational materials and side effects teaching were provided. ___________________________________________________________________________________________________________________________________ Taking Aspirin to Prevent Heart Attack and Stroke: Care Instructions Your Care Instructions Aspirin acts as a \"blood thinner. \" It prevents blood clots from forming. When taken during and after a heart attack, it can reduce your chance of dying. And it's used if you have a stent in your coronary artery. Also, aspirin helps certain people lower their risk of a heart attack or stroke. Be sure you know what dose of aspirin to take and how often to take it. Low-dose aspirin is typically 81 mg. But the dose for daily aspirin can range from 81 mg to 325 mg. Taking aspirin every day can cause bleeding. It may not be safe if you have stomach ulcers. And it may not be safe if you have high blood pressure that is not controlled. If you take aspirin pills every day, do not take ones that have other ingredients such as caffeine or sodium. Before you start to take aspirin, tell your doctor all the medicines, vitamins, herbal products, and supplements you take. Follow-up care is a key part of your treatment and safety.  Be sure to make and go to all appointments, and call your doctor if you are having problems. It's also a good idea to know your test results and keep a list of the medicines you take. How can you care for yourself at home? · Take aspirin with a full glass of water unless your doctor tells you not to. Do not lie down right after you take it. · If you have a stent in your coronary artery, take your aspirin as your heart doctor says to. If another doctor says to stop taking the aspirin for any reason, talk to your heart doctor before you stop. · Do not chew or crush the coated or sustained-release forms of aspirin. · Ask your doctor if you can drink alcohol while you take aspirin. And ask how much you can drink. Too much alcohol with aspirin can cause stomach bleeding. · Do not take aspirin if you are pregnant, unless your doctor says it is okay. · Keep all aspirin out of children's reach. · Throw aspirin away if it starts to smell like vinegar. · Do not take aspirin if you have gout or if you take prescription blood thinners, unless your doctor has told you to. · Do not take prescription or over-the-counter medicines, vitamins, herbal products, or supplements without talking to your doctor first. Anayeli Sauravg the label before you take another over-the-counter medicine. Many contain aspirin. So they could cause you to take too much aspirin. · Talk with your doctor before you take a pain medicine. Ask which type of medicine you can take and how to take it safely with aspirin. · Tell your doctor or dentist before a surgery or procedure that you take aspirin. He or she will tell you if you should stop taking aspirin before your surgery or procedure. Make sure that you understand exactly what your doctor wants you to do. Where can you learn more? Go to http://maureen-satish.info/. Enter V223 in the search box to learn more about \"Taking Aspirin to Prevent Heart Attack and Stroke: Care Instructions. \" 
 Current as of: September 21, 2016 Content Version: 11.4 © 9175-7306 [employer]. Care instructions adapted under license by [employer] ([employer]) (which disclaims liability or warranty for this information). If you have questions about a medical condition or this instruction, always ask your healthcare professional. [address] any warranty or liability for your use of this information. Noninsulin Medicines for Type 2 Diabetes: Care Instructions Your Care Instructions There are different types of noninsulin medicines for diabetes. Each works in a different way. But they all help you control your blood sugar. Some types help your body make insulin to lower your blood sugar. Others lower how much insulin your body needs. Some can slow how fast your body digests sugars. And some can remove extra glucose through your urine. · Alpha-glucosidase inhibitors. These keep starches from breaking down. This means that they lower the amount of glucose absorbed when you eat. They don't help your body make more insulin. So they will not cause low blood sugar unless you use them with other medicines for diabetes. They include acarbose and miglitol. · DPP-4 inhibitors. These help your body raise the level of insulin after you eat. They also help your body make less of a hormone that raises blood sugar. They include linagliptin, saxagliptin, and sitagliptin. · Incretin hormones (GLP-1 receptor agonists). Your body makes a protein that can raise your insulin level. It also can lower your blood sugar and make you less hungry. You can get shots of hormones that work the same way. They include exenatide and liraglutide. · Meglitinides. These help your body release insulin. They also help slow how your body digests sugars. So they can keep your blood sugar from rising too fast after you eat. They include nateglinide and repaglinide. · Metformin. This lowers how much glucose your liver makes. And it helps you respond better to insulin. It also lowers the amount of stored sugar that your liver releases when you are not eating. · SGLT2 inhibitors. These help to remove extra glucose through your urine. They may also help some people lose weight. They include canagliflozin, dapagliflozin, and empagliflozin. · Sulfonylureas. These help your body release more insulin. Some work for many hours. They can cause low blood sugar if you don't eat as you planned. They include glipizide and glyburide. · Thiazolidinediones. These reduce the amount of blood glucose. They also help you respond better to insulin. They include pioglitazone and rosiglitazone. You may need to take more than one medicine for diabetes. Two or more medicines may work better to lower your blood sugar level than just one does. Follow-up care is a key part of your treatment and safety. Be sure to make and go to all appointments, and call your doctor if you are having problems. It's also a good idea to know your test results and keep a list of the medicines you take. How can you care for yourself at home? · Eat a healthy diet. Get some exercise each day. This may help you to reduce how much medicine you need. · Do not take other prescription or over-the-counter medicines, vitamins, herbal products, or supplements without talking to your doctor first. Some medicines for type 2 diabetes can cause problems with other medicines or supplements. · Tell your doctor if you plan to get pregnant. Some of these drugs are not safe for pregnant women. · Be safe with medicines. Take your medicines exactly as prescribed. Meglitinides and sulfonylureas can cause your blood sugar to drop very low. Call your doctor if you think you are having a problem with your medicine. · Check your blood sugar often. You can use a glucose monitor.  Keeping track can help you know how certain foods, activities, and medicines affect your blood sugar. And it can help you keep your blood sugar from getting so low that it's not safe. When should you call for help? Call 911 anytime you think you may need emergency care. For example, call if: 
? · You passed out (lost consciousness). ? · You are confused or cannot think clearly. ? · Your blood sugar is very high or very low. ? Watch closely for changes in your health, and be sure to contact your doctor if: 
? · Your blood sugar stays outside the level your doctor set for you. ? · You have any problems. Where can you learn more? Go to http://maureen-satish.info/. Enter H153 in the search box to learn more about \"Noninsulin Medicines for Type 2 Diabetes: Care Instructions. \" Current as of: March 13, 2017 Content Version: 11.4 © 0818-5411 [employer]. Care instructions adapted under license by [employer] (which disclaims liability or warranty for this information). If you have questions about a medical condition or this instruction, always ask your healthcare professional. [place] [address] any warranty or liability for your use of this information. High Blood Pressure: Care Instructions Your Care Instructions If your blood pressure is usually above 140/90, you have high blood pressure, or hypertension. That means the top number is 140 or higher or the bottom number is 90 or higher, or both. Despite what a lot of people think, high blood pressure usually doesn't cause headaches or make you feel dizzy or lightheaded. It usually has no symptoms. But it does increase your risk for heart attack, stroke, and kidney or eye damage. The higher your blood pressure, the more your risk increases. Your doctor will give you a goal for your blood pressure. Your goal will be based on your health and your age.  An example of a goal is to keep your blood pressure below 140/90. Lifestyle changes, such as eating healthy and being active, are always important to help lower blood pressure. You might also take medicine to reach your blood pressure goal. 
Follow-up care is a key part of your treatment and safety. Be sure to make and go to all appointments, and call your doctor if you are having problems. It's also a good idea to know your test results and keep a list of the medicines you take. How can you care for yourself at home? Medical treatment · If you stop taking your medicine, your blood pressure will go back up. You may take one or more types of medicine to lower your blood pressure. Be safe with medicines. Take your medicine exactly as prescribed. Call your doctor if you think you are having a problem with your medicine. · Talk to your doctor before you start taking aspirin every day. Aspirin can help certain people lower their risk of a heart attack or stroke. But taking aspirin isn't right for everyone, because it can cause serious bleeding. · See your doctor regularly. You may need to see the doctor more often at first or until your blood pressure comes down. · If you are taking blood pressure medicine, talk to your doctor before you take decongestants or anti-inflammatory medicine, such as ibuprofen. Some of these medicines can raise blood pressure. · Learn how to check your blood pressure at home. Lifestyle changes · Stay at a healthy weight. This is especially important if you put on weight around the waist. Losing even 10 pounds can help you lower your blood pressure. · If your doctor recommends it, get more exercise. Walking is a good choice. Bit by bit, increase the amount you walk every day. Try for at least 30 minutes on most days of the week. You also may want to swim, bike, or do other activities. · Avoid or limit alcohol. Talk to your doctor about whether you can drink any alcohol. · Try to limit how much sodium you eat to less than 2,300 milligrams (mg) a day. Your doctor may ask you to try to eat less than 1,500 mg a day. · Eat plenty of fruits (such as bananas and oranges), vegetables, legumes, whole grains, and low-fat dairy products. · Lower the amount of saturated fat in your diet. Saturated fat is found in animal products such as milk, cheese, and meat. Limiting these foods may help you lose weight and also lower your risk for heart disease. · Do not smoke. Smoking increases your risk for heart attack and stroke. If you need help quitting, talk to your doctor about stop-smoking programs and medicines. These can increase your chances of quitting for good. When should you call for help? Call 911 anytime you think you may need emergency care. This may mean having symptoms that suggest that your blood pressure is causing a serious heart or blood vessel problem. Your blood pressure may be over 180/110. ? For example, call 911 if: 
? · You have symptoms of a heart attack. These may include: ¨ Chest pain or pressure, or a strange feeling in the chest. 
¨ Sweating. ¨ Shortness of breath. ¨ Nausea or vomiting. ¨ Pain, pressure, or a strange feeling in the back, neck, jaw, or upper belly or in one or both shoulders or arms. ¨ Lightheadedness or sudden weakness. ¨ A fast or irregular heartbeat. ? · You have symptoms of a stroke. These may include: 
¨ Sudden numbness, tingling, weakness, or loss of movement in your face, arm, or leg, especially on only one side of your body. ¨ Sudden vision changes. ¨ Sudden trouble speaking. ¨ Sudden confusion or trouble understanding simple statements. ¨ Sudden problems with walking or balance. ¨ A sudden, severe headache that is different from past headaches. ? · You have severe back or belly pain. ?Do not wait until your blood pressure comes down on its own. Get help right away. ?Call your doctor now or seek immediate care if: ? · Your blood pressure is much higher than normal (such as 180/110 or higher), but you don't have symptoms. ? · You think high blood pressure is causing symptoms, such as: ¨ Severe headache. ¨ Blurry vision. ? Watch closely for changes in your health, and be sure to contact your doctor if: 
? · Your blood pressure measures 140/90 or higher at least 2 times. That means the top number is 140 or higher or the bottom number is 90 or higher, or both. ? · You think you may be having side effects from your blood pressure medicine. ? · Your blood pressure is usually normal, but it goes above normal at least 2 times. Where can you learn more? Go to http://maureen-satish.info/. Enter G617 in the search box to learn more about \"High Blood Pressure: Care Instructions. \" Current as of: September 21, 2016 Content Version: 11.4 © 2799-6661 [employer]. Care instructions adapted under license by [employer] (which disclaims liability or warranty for this information). If you have questions about a medical condition or this instruction, always ask your healthcare professional. [place] [address] any warranty or liability for your use of this information. If your blood pressure is usually above 140/90, you have high blood pressure, or hypertension. That means the top number is 140 or higher or the bottom number is 90 or higher, or both. Despite what a lot of people think, high blood pressure usually doesn't cause headaches or make you feel dizzy or lightheaded. It usually has no symptoms. But it does increase your risk for heart attack, stroke, and kidney or eye damage. The higher your blood pressure, the more your risk increases. Your doctor will give you a goal for your blood pressure. Your goal will be based on your health and your age. An example of a goal is to keep your blood pressure below 140/90. Lifestyle changes, such as eating healthy and being active, are always important to help lower blood pressure. You might also take medicine to reach your blood pressure goal. 
Follow-up care is a key part of your treatment and safety. Be sure to make and go to all appointments, and call your doctor if you are having problems. It's also a good idea to know your test results and keep a list of the medicines you take. How can you care for yourself at home? Medical treatment · If you stop taking your medicine, your blood pressure will go back up. You may take one or more types of medicine to lower your blood pressure. Be safe with medicines. Take your medicine exactly as prescribed. Call your doctor if you think you are having a problem with your medicine. · Talk to your doctor before you start taking aspirin every day. Aspirin can help certain people lower their risk of a heart attack or stroke. But taking aspirin isn't right for everyone, because it can cause serious bleeding. · See your doctor regularly. You may need to see the doctor more often at first or until your blood pressure comes down. · If you are taking blood pressure medicine, talk to your doctor before you take decongestants or anti-inflammatory medicine, such as ibuprofen. Some of these medicines can raise blood pressure. · Learn how to check your blood pressure at home. Lifestyle changes · Stay at a healthy weight. This is especially important if you put on weight around the waist. Losing even 10 pounds can help you lower your blood pressure. · If your doctor recommends it, get more exercise. Walking is a good choice. Bit by bit, increase the amount you walk every day. Try for at least 30 minutes on most days of the week. You also may want to swim, bike, or do other activities. · Avoid or limit alcohol. Talk to your doctor about whether you can drink any alcohol.  
· Try to limit how much sodium you eat to less than 2,300 milligrams (mg) a day. Your doctor may ask you to try to eat less than 1,500 mg a day. · Eat plenty of fruits (such as bananas and oranges), vegetables, legumes, whole grains, and low-fat dairy products. · Lower the amount of saturated fat in your diet. Saturated fat is found in animal products such as milk, cheese, and meat. Limiting these foods may help you lose weight and also lower your risk for heart disease. · Do not smoke. Smoking increases your risk for heart attack and stroke. If you need help quitting, talk to your doctor about stop-smoking programs and medicines. These can increase your chances of quitting for good. When should you call for help? Call 911 anytime you think you may need emergency care. This may mean having symptoms that suggest that your blood pressure is causing a serious heart or blood vessel problem. Your blood pressure may be over 180/110. ? For example, call 911 if: 
? · You have symptoms of a heart attack. These may include: ¨ Chest pain or pressure, or a strange feeling in the chest. 
¨ Sweating. ¨ Shortness of breath. ¨ Nausea or vomiting. ¨ Pain, pressure, or a strange feeling in the back, neck, jaw, or upper belly or in one or both shoulders or arms. ¨ Lightheadedness or sudden weakness. ¨ A fast or irregular heartbeat. ? · You have symptoms of a stroke. These may include: 
¨ Sudden numbness, tingling, weakness, or loss of movement in your face, arm, or leg, especially on only one side of your body. ¨ Sudden vision changes. ¨ Sudden trouble speaking. ¨ Sudden confusion or trouble understanding simple statements. ¨ Sudden problems with walking or balance. ¨ A sudden, severe headache that is different from past headaches. ? · You have severe back or belly pain. ?Do not wait until your blood pressure comes down on its own. Get help right away. ?Call your doctor now or seek immediate care if: 
? · Your blood pressure is much higher than normal (such as 180/110 or higher), but you don't have symptoms. ? · You think high blood pressure is causing symptoms, such as: ¨ Severe headache. ¨ Blurry vision. ? Watch closely for changes in your health, and be sure to contact your doctor if: 
? · Your blood pressure measures 140/90 or higher at least 2 times. That means the top number is 140 or higher or the bottom number is 90 or higher, or both. ? · You think you may be having side effects from your blood pressure medicine. ? · Your blood pressure is usually normal, but it goes above normal at least 2 times. Where can you learn more? Go to http://maureen-satish.info/. Enter Z695 in the search box to learn more about \"High Blood Pressure: Care Instructions. \" Current as of: September 21, 2016 Content Version: 11.4 © 3992-0688 [employer]. Care instructions adapted under license by ip.access (which disclaims liability or warranty for this information). If you have questions about a medical condition or this instruction, always ask your healthcare professional. [place] [address] any warranty or liability for your use of this information. Learning About Medicines That Lower Your Risk of Another Stroke Introduction After you have a stroke, going home may be hard, both for you and for your loved ones. There is a lot to think about. Remember to take one day at a time. An important part of your treatment will be to prevent another stroke. And a big part of that is taking medicines. Some of the medicines your doctor may have you take include: · Aspirin or other blood thinners. They help prevent blood clots. Most strokes are caused by blood clots. · Statins to lower cholesterol. High cholesterol raises your stroke risk. · Medicines for high blood pressure or diabetes. These conditions raise your stroke risk. All medicines can cause side effects.  So it is important to understand the pros and cons of any medicine you take. It is also important to take your medicines exactly as your doctor tells you to. Be sure to tell your doctor if you take any other prescription medicine, over-the-counter medicine, vitamins, supplements, or herbal remedies. Have a pill plan You may have several pills to take every day. Having a plan for how you will remember to take them may help ease your fears and stress. To make a pill plan, write a list of your medicines. Then write down the details for each one. Include when you started using each medicine, when you take it, how much you take each time (number of pills and milligrams in each pill), and any side effects. Before you leave the hospital, be sure to ask questions if you don't understand or need help with your pill plan. And be sure you know who to call when you have questions at home. Blood thinners One of the best things you can do to prevent another stroke is to take a medicine called a blood thinner. These medicines don't really thin your blood. They work by helping to prevent blood clots. Blood clots can cause a stroke if they block a blood vessel in the brain. So when you prevent blood clots, you help prevent a stroke. Antiplatelets are a type of blood thinner. They help keep platelets from sticking together and forming blood clots. (A platelet is a type of blood cell.) Examples of antiplatelets include: · Aspirin (Michael, Bufferin, Ecotrin). · Aspirin combined with dipyridamole (Aggrenox). · Clopidogrel (Plavix). Another type of blood thinner, called an anticoagulant, may be used if you also have atrial fibrillation. This is a heart rhythm problem. It raises your risk of having a stroke. Be sure to learn how to take your medicine safely. Blood thinners can cause serious bleeding problems. Statins Statins lower the amount of cholesterol in your blood. If you have too much cholesterol, it starts to build up in blood vessels.  And that's how most heart and blood flow problems, including strokes, start. Statins also reduce inflammation around the cholesterol buildup. This may lower the risk that the buildup will break apart and cause a blood clot that can lead to a stroke. Examples of statins include: · Atorvastatin (Lipitor). · Lovastatin (Mevacor). · Pravastatin (Pravachol). · Simvastatin (Zocor). Blood pressure medicines If you have high blood pressure, you may take medicines to lower it. High blood pressure damages blood vessels. Damaged vessels clog up more easily. And that can cause a stroke. If you were taking blood pressure pills before, your doctor may have you keep taking them. Or your doctor may have you take a different type. Blood pressure medicines may include: · ACE (angiotensin-converting enzyme) inhibitors. · Angiotensin II receptor blockers (ARBs). · Beta-blockers. · Diuretics (water pills). · Calcium channel blockers. Follow-up care is a key part of your treatment and safety. Be sure to make and go to all appointments, and call your doctor if you are having problems. It's also a good idea to know your test results and keep a list of the medicines you take. Where can you learn more? Go to http://maureen-satish.info/. Enter F086 in the search box to learn more about \"Learning About Medicines That Lower Your Risk of Another Stroke. \" Current as of: March 20, 2017 Content Version: 11.4 © 4270-8190 [employer]. Care instructions adapted under license by [employer] (which disclaims liability or warranty for this information). If you have questions about a medical condition or this instruction, always ask your healthcare professional. [place] [address] any warranty or liability for your use of this information. Stroke: Care Instructions Your Care Instructions You have had a stroke.  This means that the blood flow to a part of your brain was blocked for some time, which damages the nerve cells in that part of the brain. The part of your body controlled by that part of your brain may not function properly now. The brain is an amazing organ that can heal itself to some degree. The stroke you had damaged part of your brain. But other parts of your brain may take over in some way for the damaged areas. You have already started this process. Your doctor will talk with you about what you can do to prevent another stroke. High blood pressure, high cholesterol, and diabetes are all risk factors for stroke. If you have any of these conditions, work with your doctor to make sure they are under control. Other risk factors for stroke include being overweight, smoking, and not getting regular exercise. Going home may be hard for you and your family. The more you can try to do for yourself, the better. Remember to take each day one at a time. Follow-up care is a key part of your treatment and safety. Be sure to make and go to all appointments, and call your doctor if you are having problems. It's also a good idea to know your test results and keep a list of the medicines you take. How can you care for yourself at home? ? · Enter a stroke rehabilitation (rehab) program, if your doctor recommends it. Physical, speech, and occupational therapies can help you manage bathing, dressing, eating, and other basics of daily living. ? · Do not drive until your doctor says it is okay. ? · It is normal to feel sad or depressed after a stroke. If these feelings last, talk to your doctor. ? · If you are having problems with urine leakage, go to the bathroom at regular times, including when you first wake up and at bedtime. Also, limit fluids after dinner. ? · If you are constipated, drink plenty of fluids, enough so that your urine is light yellow or clear like water.  If you have kidney, heart, or liver disease and have to limit fluids, talk with your doctor before you increase the amount of fluids you drink. Set up a regular time for using the toilet. If you continue to have constipation, your doctor may suggest using a bulking agent, such as Metamucil, or a stool softener, laxative, or enema. Medicines ? · Take your medicines exactly as prescribed. Call your doctor if you think you are having a problem with your medicine. You may be taking several medicines. ACE (angiotensin-converting enzyme) inhibitors, angiotensin II receptor blockers (ARBs), beta-blockers, diuretics (water pills), and calcium channel blockers control your blood pressure. Statins help lower cholesterol. Your doctor may also prescribe medicines for depression, pain, sleep problems, anxiety, or agitation. ? · If your doctor has given you a blood thinner to prevent another stroke, be sure you get instructions about how to take your medicine safely. Blood thinners can cause serious bleeding problems. ? · Do not take any over-the-counter medicines or herbal products without talking to your doctor first.  
? · If you take birth control pills or hormone therapy, talk to your doctor about whether they are right for you. ? For family members and caregivers ? · Make the home safe. Set up a room so that your loved one does not have to climb stairs. Be sure the bathroom is on the same floor. Move throw rugs and furniture that could cause falls. Make sure that the lighting is good. Put grab bars and seats in tubs and showers. ? · Find out what your loved one can do and what he or she needs help with. Try not to do things for your loved one that your loved one can do on his or her own. Help him or her learn and practice new skills. ? · Visit and talk with your loved one often. Try doing activities together that you both enjoy, such as playing cards or board games.  Keep in touch with your loved one's friends as much as you can. Encourage them to visit. ? · Take care of yourself. Do not try to do everything yourself. Ask other family members to help. Eat well, get enough rest, and take time to do things that you enjoy. Keep up with your own doctor visits, and make sure to take your medicines regularly. Get out of the house as much as you can. Join a local support group. Find out if you qualify for home health care visits to help with rehab or for adult day care. When should you call for help? Call 911 anytime you think you may need emergency care. For example, call if: 
? · You have signs of another stroke. These may include: 
¨ Sudden numbness, tingling, weakness, or loss of movement in your face, arm, or leg, especially on only one side of your body. ¨ Sudden vision changes. ¨ Sudden trouble speaking. ¨ Sudden confusion or trouble understanding simple statements. ¨ Sudden problems with walking or balance. ¨ A sudden, severe headache that is different from past headaches. Call 911 even if these symptoms go away in a few minutes. ?Call your doctor now or seek immediate medical care if: 
? · You have new symptoms that may be related to your stroke, such as falls or trouble swallowing. ? Watch closely for changes in your health, and be sure to contact your doctor if you have any problems. Where can you learn more? Go to http://maureen-satish.info/. Enter O243 in the search box to learn more about \"Stroke: Care Instructions. \" Current as of: March 20, 2017 Content Version: 11.4 © 3993-2511 [employer]. Care instructions adapted under license by [employer] (which disclaims liability or warranty for this information). If you have questions about a medical condition or this instruction, always ask your healthcare professional. [address] any warranty or liability for your use of this information. SPORTLOGiQ Announcement We are excited to announce that we are making your provider's discharge notes available to you in SPORTLOGiQ. You will see these notes when they are completed and signed by the physician that discharged you from your recent hospital stay. If you have any questions or concerns about any information you see in SPORTLOGiQ, please call the Health Information Department where you were seen or reach out to your Primary Care Provider for more information about your plan of care. Introducing [facility]! Mayo Segovia introduces SPORTLOGiQ patient portal. Now you can access parts of your medical record, email your doctor's office, and request medication refills online. 1. In your internet browser, go to [email or web address] 2. Click on the First Time User? Click Here link in the Sign In box. You will see the New Member Sign Up page. 3. Enter your SPORTLOGiQ Access Code exactly as it appears below. You will not need to use this code after youve completed the sign-up process. If you do not sign up before the expiration date, you must request a new code. · SPORTLOGiQ Access Code: [email or web address] Expires: 6/19/2018 12:49 PM 
 
4. Enter the last four digits of your Social Security Number (xxxx) and Date of Birth (mm/dd/yyyy) as indicated and click Submit. You will be taken to the next sign-up page. 5. Create a SPORTLOGiQ ID. This will be your SPORTLOGiQ login ID and cannot be changed, so think of one that is secure and easy to remember. 6. Create a SPORTLOGiQ password. You can change your password at any time. 7. Enter your Password Reset Question and Answer. This can be used at a later time if you forget your password. 8. Enter your e-mail address. You will receive e-mail notification when new information is available in [address] 9. Click Sign Up. You can now view and download portions of your medical record. 10. Click the Download Summary menu link to download a portable copy of your medical information. If you have questions, please visit the Frequently Asked Questions section of the MyChart website. Remember, [employer] is NOT to be used for urgent needs. For medical emergencies, dial 911. Now available from your iPhone and Android! Unresulted Labs-Please follow up with your PCP about these lab tests Order Current Status DRUG SCREEN, URINE Collected (03/20/18 1105) Providers Seen During Your Hospitalization Provider Specialty Primary office phone Ramonita Fernandez MD Emergency Medicine [phone] Vivian Goodson MD Internal Medicine [phone] Hollie Ward DO Internal Medicine [phone] Your Primary Care Physician (PCP) Primary Care Physician Office Phone Office Fax Jennifer Distad [address] You are allergic to the following No active allergies Recent Documentation Height Weight Breastfeeding? BMI OB Status Smoking Status 1.6 m 103.4 kg No 40.39 kg/m2 Postmenopausal Former Smoker Emergency Contacts Name Discharge Info Relation Home Work Mobile Sandy Mims DISCHARGE CAREGIVER [3] Other Relative [6]   [phone] Patient Belongings The following personal items are in your possession at time of discharge: 
  Dental Appliances: None  Visual Aid: None      Home Medications: None   Jewelry: None  Clothing: Footwear, Jacket/Coat, Pants, Shirt, Socks    Other Valuables: None  Personal Items Sent to Safe: none Discharge Instructions Attachments/References TRANSIENT ISCHEMIC ATTACK: GENERAL INFO (ENGLISH) Patient Handouts Learning About Transient Ischemic Attack (TIA) What is a TIA? A transient ischemic attack (TIA) means that the blood flow to a part of the brain is blocked for a short time.  A TIA feels like a stroke but usually lasts only 10 to 20 minutes. Unlike a stroke, a TIA does not cause lasting brain damage. A TIA is usually caused by a blood clot that blocks blood flow in the brain. A blood clot can form in another part of the body (often the heart) and travel through the bloodstream to the brain. When blood flow to part of the brain is blocked, the brain cells in that area are affected within seconds. This causes symptoms in parts of the body controlled by those brain cells. When the blood clot dissolves, blood flow returns, and the symptoms go away. Blood clots can be the result of hardening of the arteries (atherosclerosis) or a heart attack. Sometimes a TIA is caused by a sharp drop in blood pressure that reduces blood flow to the brain. This is called a \"low-flow\" TIA. It is not as common as a TIA caused by a blood clot. What happens after a TIA? TIA is a serious warning sign of a possible stroke in the future. If you have other medical conditions such as coronary artery disease or atherosclerosis, you may also have an increased risk for a heart attack. Talk to your doctor about your risk. Understanding your risk will help you and your doctor plan your treatment options. You can do a lot to lower your chance of having another TIA or a stroke. Medicines can help, and you may also need to make lifestyle changes. What are the symptoms? Symptoms of a TIA are the same as symptoms of a stroke. But symptoms of a TIA don't last very long. Most of the time, they go away in 10 to 20 minutes. They may include: 
· Sudden numbness, tingling, weakness, or loss of movement in your face, arm, or leg, especially on only one side of your body. · Sudden vision changes. · Sudden trouble speaking. · Sudden confusion or trouble understanding simple statements. · Sudden problems with walking or balance. If you have any of these symptoms, call 911 or other emergency services right away. Ask your family, friends, and coworkers to learn the signs of a TIA. They may notice these signs before you do. Make sure they know to call 911 if these signs appear. How is a TIA treated? If you've had a TIA, you may need more testing and treatment after you get checked by your doctor. If you have a high risk of stroke, you may have to stay in the hospital for treatment. Your treatment for a TIA may include taking medicines to prevent blood clots or a stroke, or having surgery to reopen narrow arteries. How can you prevent another TIA? · Work with your doctor to treat any health problems you have. High blood pressure, high cholesterol, atrial fibrillation, and diabetes all raise your chances of having a stroke. · Be safe with medicines. Take your medicine exactly as prescribed. Call your doctor if you think you are having a problem with your medicine. · Have a healthy lifestyle. ¨ Do not smoke or allow others to smoke around you. If you need help quitting, talk to your doctor about stop-smoking programs and medicines. These can increase your chances of quitting for good. Smoking makes a stroke more likely. ¨ Limit alcohol to 2 drinks a day for men and 1 drink a day for women. ¨ Lose weight if you need to. A healthy weight will help you keep your heart and body healthy. ¨ Be active. Ask your doctor what type and level of activity are safe for you. ¨ Eat heart-healthy foods, like fruits, vegetables, and high-fiber foods. Follow-up care is a key part of your treatment and safety. Be sure to make and go to all appointments, and call your doctor if you are having problems. It's also a good idea to know your test results and keep a list of the medicines you take. Where can you learn more? Go to http://maureen-satish.info/. Enter M425 in the search box to learn more about \"Learning About Transient Ischemic Attack (TIA). \" 
Current as of: March 20, 2017 Content Version: 11.4 © 6665-5203 [employer]. Care instructions adapted under license by [employer] (which disclaims liability or warranty for this information). If you have questions about a medical condition or this instruction, always ask your healthcare professional. [address] any warranty or liability for your use of this information. Please provide this summary of care documentation to your next provider. Signatures-by signing, you are acknowledging that this After Visit Summary has been reviewed with you and you have received a copy. Patient Signature:  ____________________________________________________________ Date:  ____________________________________________________________  
  
Falguni Rivera Provider Signature:  ____________________________________________________________ Date:  ____________________________________________________________

## 2018-03-20 ENCOUNTER — APPOINTMENT (OUTPATIENT)
Dept: MRI IMAGING | Age: 73
DRG: 065 | End: 2018-03-20
Attending: HOSPITALIST
Payer: MEDICARE

## 2018-03-20 PROBLEM — G45.9 TIA (TRANSIENT ISCHEMIC ATTACK): Status: ACTIVE | Noted: 2018-03-20

## 2018-03-20 PROBLEM — I63.9 STROKE (HCC): Status: ACTIVE | Noted: 2018-03-20

## 2018-03-20 LAB
ABO + RH BLD: NORMAL
BLOOD GROUP ANTIBODIES SERPL: NORMAL
CHOLEST SERPL-MCNC: 279 MG/DL
ERYTHROCYTE [SEDIMENTATION RATE] IN BLOOD: 52 MM/HR (ref 0–30)
EST. AVERAGE GLUCOSE BLD GHB EST-MCNC: 232 MG/DL
GLUCOSE BLD STRIP.AUTO-MCNC: 156 MG/DL (ref 70–110)
GLUCOSE BLD STRIP.AUTO-MCNC: 160 MG/DL (ref 70–110)
GLUCOSE BLD STRIP.AUTO-MCNC: 213 MG/DL (ref 70–110)
GLUCOSE BLD STRIP.AUTO-MCNC: 310 MG/DL (ref 70–110)
GLUCOSE BLD STRIP.AUTO-MCNC: 80 MG/DL (ref 70–110)
HBA1C MFR BLD: 9.7 % (ref 4.2–5.6)
HDLC SERPL-MCNC: 52 MG/DL (ref 40–60)
HDLC SERPL: 5.4 {RATIO} (ref 0–5)
LDLC SERPL CALC-MCNC: 188.4 MG/DL (ref 0–100)
LIPID PROFILE,FLP: ABNORMAL
SPECIMEN EXP DATE BLD: NORMAL
T3FREE SERPL-MCNC: 2.5 PG/ML (ref 2.3–4.2)
T4 FREE SERPL-MCNC: 1.4 NG/DL (ref 0.7–1.5)
TRIGL SERPL-MCNC: 193 MG/DL (ref ?–150)
TROPONIN I SERPL-MCNC: <0.02 NG/ML (ref 0–0.04)
TSH SERPL DL<=0.05 MIU/L-ACNC: 1.67 UIU/ML (ref 0.36–3.74)
VLDLC SERPL CALC-MCNC: 38.6 MG/DL

## 2018-03-20 PROCEDURE — 74011250636 HC RX REV CODE- 250/636: Performed by: HOSPITALIST

## 2018-03-20 PROCEDURE — 86141 C-REACTIVE PROTEIN HS: CPT | Performed by: HOSPITALIST

## 2018-03-20 PROCEDURE — 70553 MRI BRAIN STEM W/O & W/DYE: CPT

## 2018-03-20 PROCEDURE — 93306 TTE W/DOPPLER COMPLETE: CPT

## 2018-03-20 PROCEDURE — 36415 COLL VENOUS BLD VENIPUNCTURE: CPT | Performed by: HOSPITALIST

## 2018-03-20 PROCEDURE — 74011250637 HC RX REV CODE- 250/637: Performed by: EMERGENCY MEDICINE

## 2018-03-20 PROCEDURE — 74011636637 HC RX REV CODE- 636/637: Performed by: HOSPITALIST

## 2018-03-20 PROCEDURE — 84443 ASSAY THYROID STIM HORMONE: CPT | Performed by: HOSPITALIST

## 2018-03-20 PROCEDURE — 84439 ASSAY OF FREE THYROXINE: CPT | Performed by: HOSPITALIST

## 2018-03-20 PROCEDURE — 92610 EVALUATE SWALLOWING FUNCTION: CPT

## 2018-03-20 PROCEDURE — 92526 ORAL FUNCTION THERAPY: CPT

## 2018-03-20 PROCEDURE — 84481 FREE ASSAY (FT-3): CPT | Performed by: HOSPITALIST

## 2018-03-20 PROCEDURE — 74011636320 HC RX REV CODE- 636/320: Performed by: EMERGENCY MEDICINE

## 2018-03-20 PROCEDURE — 74011250637 HC RX REV CODE- 250/637: Performed by: HOSPITALIST

## 2018-03-20 PROCEDURE — 77030020263 HC SOL INJ SOD CL0.9% LFCR 1000ML

## 2018-03-20 PROCEDURE — A9575 INJ GADOTERATE MEGLUMI 0.1ML: HCPCS | Performed by: HOSPITALIST

## 2018-03-20 PROCEDURE — 97165 OT EVAL LOW COMPLEX 30 MIN: CPT

## 2018-03-20 PROCEDURE — 97530 THERAPEUTIC ACTIVITIES: CPT

## 2018-03-20 PROCEDURE — 97162 PT EVAL MOD COMPLEX 30 MIN: CPT

## 2018-03-20 PROCEDURE — 82962 GLUCOSE BLOOD TEST: CPT

## 2018-03-20 PROCEDURE — 84484 ASSAY OF TROPONIN QUANT: CPT | Performed by: EMERGENCY MEDICINE

## 2018-03-20 PROCEDURE — 65660000000 HC RM CCU STEPDOWN

## 2018-03-20 PROCEDURE — 74011250636 HC RX REV CODE- 250/636: Performed by: EMERGENCY MEDICINE

## 2018-03-20 PROCEDURE — 80061 LIPID PANEL: CPT | Performed by: HOSPITALIST

## 2018-03-20 PROCEDURE — 83036 HEMOGLOBIN GLYCOSYLATED A1C: CPT | Performed by: HOSPITALIST

## 2018-03-20 PROCEDURE — 85652 RBC SED RATE AUTOMATED: CPT | Performed by: HOSPITALIST

## 2018-03-20 RX ORDER — FOLIC ACID 1 MG/1
1 TABLET ORAL DAILY
Status: DISCONTINUED | OUTPATIENT
Start: 2018-03-20 | End: 2018-03-21 | Stop reason: HOSPADM

## 2018-03-20 RX ORDER — ACETAMINOPHEN 325 MG/1
650 TABLET ORAL
Status: DISCONTINUED | OUTPATIENT
Start: 2018-03-20 | End: 2018-03-21 | Stop reason: HOSPADM

## 2018-03-20 RX ORDER — INSULIN GLARGINE 100 [IU]/ML
10 INJECTION, SOLUTION SUBCUTANEOUS DAILY
Status: DISCONTINUED | OUTPATIENT
Start: 2018-03-20 | End: 2018-03-20

## 2018-03-20 RX ORDER — INSULIN GLARGINE 100 [IU]/ML
12 INJECTION, SOLUTION SUBCUTANEOUS DAILY
Status: DISCONTINUED | OUTPATIENT
Start: 2018-03-21 | End: 2018-03-21 | Stop reason: HOSPADM

## 2018-03-20 RX ORDER — ONDANSETRON 2 MG/ML
2 INJECTION INTRAMUSCULAR; INTRAVENOUS
Status: DISCONTINUED | OUTPATIENT
Start: 2018-03-20 | End: 2018-03-21 | Stop reason: HOSPADM

## 2018-03-20 RX ORDER — ALBUTEROL SULFATE 0.83 MG/ML
2.5 SOLUTION RESPIRATORY (INHALATION)
Status: DISCONTINUED | OUTPATIENT
Start: 2018-03-20 | End: 2018-03-21 | Stop reason: HOSPADM

## 2018-03-20 RX ORDER — INSULIN LISPRO 100 [IU]/ML
3 INJECTION, SOLUTION INTRAVENOUS; SUBCUTANEOUS
Status: DISCONTINUED | OUTPATIENT
Start: 2018-03-20 | End: 2018-03-21 | Stop reason: HOSPADM

## 2018-03-20 RX ORDER — INSULIN ASPART 100 [IU]/ML
3 INJECTION, SOLUTION INTRAVENOUS; SUBCUTANEOUS
Status: DISCONTINUED | OUTPATIENT
Start: 2018-03-20 | End: 2018-03-20

## 2018-03-20 RX ORDER — ASPIRIN 325 MG
325 TABLET ORAL DAILY
Status: DISCONTINUED | OUTPATIENT
Start: 2018-03-20 | End: 2018-03-21 | Stop reason: HOSPADM

## 2018-03-20 RX ORDER — ASPIRIN 325 MG/1
100 TABLET, FILM COATED ORAL DAILY
Status: DISCONTINUED | OUTPATIENT
Start: 2018-03-20 | End: 2018-03-21 | Stop reason: HOSPADM

## 2018-03-20 RX ORDER — PANTOPRAZOLE SODIUM 40 MG/1
40 GRANULE, DELAYED RELEASE ORAL EVERY 12 HOURS
Status: DISCONTINUED | OUTPATIENT
Start: 2018-03-20 | End: 2018-03-21 | Stop reason: HOSPADM

## 2018-03-20 RX ORDER — HEPARIN SODIUM 5000 [USP'U]/ML
5000 INJECTION, SOLUTION INTRAVENOUS; SUBCUTANEOUS EVERY 8 HOURS
Status: DISCONTINUED | OUTPATIENT
Start: 2018-03-20 | End: 2018-03-21 | Stop reason: HOSPADM

## 2018-03-20 RX ORDER — GABAPENTIN 300 MG/1
300 CAPSULE ORAL
Status: DISCONTINUED | OUTPATIENT
Start: 2018-03-20 | End: 2018-03-21 | Stop reason: HOSPADM

## 2018-03-20 RX ORDER — ACETAMINOPHEN 650 MG/1
650 SUPPOSITORY RECTAL
Status: DISCONTINUED | OUTPATIENT
Start: 2018-03-20 | End: 2018-03-21 | Stop reason: HOSPADM

## 2018-03-20 RX ORDER — PANTOPRAZOLE SODIUM 40 MG/1
40 TABLET, DELAYED RELEASE ORAL EVERY 12 HOURS
Status: DISCONTINUED | OUTPATIENT
Start: 2018-03-20 | End: 2018-03-21 | Stop reason: HOSPADM

## 2018-03-20 RX ORDER — GADOTERATE MEGLUMINE 376.9 MG/ML
15 INJECTION INTRAVENOUS
Status: COMPLETED | OUTPATIENT
Start: 2018-03-20 | End: 2018-03-20

## 2018-03-20 RX ORDER — ATORVASTATIN CALCIUM 40 MG/1
80 TABLET, FILM COATED ORAL
Status: DISCONTINUED | OUTPATIENT
Start: 2018-03-20 | End: 2018-03-21 | Stop reason: HOSPADM

## 2018-03-20 RX ORDER — AMOXICILLIN 250 MG
2 CAPSULE ORAL
Status: DISCONTINUED | OUTPATIENT
Start: 2018-03-20 | End: 2018-03-21 | Stop reason: HOSPADM

## 2018-03-20 RX ORDER — CLOPIDOGREL BISULFATE 75 MG/1
300 TABLET ORAL
Status: COMPLETED | OUTPATIENT
Start: 2018-03-20 | End: 2018-03-20

## 2018-03-20 RX ORDER — INSULIN LISPRO 100 [IU]/ML
INJECTION, SOLUTION INTRAVENOUS; SUBCUTANEOUS
Status: DISCONTINUED | OUTPATIENT
Start: 2018-03-20 | End: 2018-03-21 | Stop reason: HOSPADM

## 2018-03-20 RX ORDER — IBUPROFEN 200 MG
1 TABLET ORAL DAILY
Status: DISCONTINUED | OUTPATIENT
Start: 2018-03-20 | End: 2018-03-21 | Stop reason: HOSPADM

## 2018-03-20 RX ORDER — LORAZEPAM 2 MG/ML
0.5 INJECTION INTRAMUSCULAR ONCE
Status: COMPLETED | OUTPATIENT
Start: 2018-03-20 | End: 2018-03-20

## 2018-03-20 RX ORDER — NITROGLYCERIN 0.4 MG/1
0.4 TABLET SUBLINGUAL
Status: DISCONTINUED | OUTPATIENT
Start: 2018-03-20 | End: 2018-03-21 | Stop reason: HOSPADM

## 2018-03-20 RX ADMIN — GABAPENTIN 300 MG: 300 CAPSULE ORAL at 22:03

## 2018-03-20 RX ADMIN — INSULIN LISPRO 3 UNITS: 100 INJECTION, SOLUTION INTRAVENOUS; SUBCUTANEOUS at 22:03

## 2018-03-20 RX ADMIN — INSULIN GLARGINE 10 UNITS: 100 INJECTION, SOLUTION SUBCUTANEOUS at 08:42

## 2018-03-20 RX ADMIN — INSULIN LISPRO 12 UNITS: 100 INJECTION, SOLUTION INTRAVENOUS; SUBCUTANEOUS at 12:43

## 2018-03-20 RX ADMIN — DOCUSATE SODIUM AND SENNOSIDES 2 TABLET: 8.6; 5 TABLET, FILM COATED ORAL at 06:19

## 2018-03-20 RX ADMIN — NITROGLYCERIN 1 INCH: 20 OINTMENT TOPICAL at 00:16

## 2018-03-20 RX ADMIN — Medication 100 MG: at 08:41

## 2018-03-20 RX ADMIN — PANTOPRAZOLE SODIUM 40 MG: 40 TABLET, DELAYED RELEASE ORAL at 22:03

## 2018-03-20 RX ADMIN — FOLIC ACID 1 MG: 1 TABLET ORAL at 08:41

## 2018-03-20 RX ADMIN — SODIUM CHLORIDE 75 ML/HR: 900 INJECTION, SOLUTION INTRAVENOUS at 13:08

## 2018-03-20 RX ADMIN — IOPAMIDOL 60 ML: 612 INJECTION, SOLUTION INTRAVENOUS at 00:36

## 2018-03-20 RX ADMIN — CLOPIDOGREL BISULFATE 300 MG: 75 TABLET ORAL at 01:55

## 2018-03-20 RX ADMIN — GABAPENTIN 300 MG: 300 CAPSULE ORAL at 06:19

## 2018-03-20 RX ADMIN — PANTOPRAZOLE SODIUM 40 MG: 40 TABLET, DELAYED RELEASE ORAL at 08:41

## 2018-03-20 RX ADMIN — DOCUSATE SODIUM AND SENNOSIDES 2 TABLET: 8.6; 5 TABLET, FILM COATED ORAL at 22:03

## 2018-03-20 RX ADMIN — HEPARIN SODIUM 5000 UNITS: 5000 INJECTION, SOLUTION INTRAVENOUS; SUBCUTANEOUS at 12:44

## 2018-03-20 RX ADMIN — ATORVASTATIN CALCIUM 80 MG: 40 TABLET, FILM COATED ORAL at 06:19

## 2018-03-20 RX ADMIN — INSULIN LISPRO 4 UNITS: 100 INJECTION, SOLUTION INTRAVENOUS; SUBCUTANEOUS at 08:42

## 2018-03-20 RX ADMIN — LORAZEPAM 0.5 MG: 2 INJECTION INTRAMUSCULAR; INTRAVENOUS at 14:21

## 2018-03-20 RX ADMIN — HEPARIN SODIUM 5000 UNITS: 5000 INJECTION, SOLUTION INTRAVENOUS; SUBCUTANEOUS at 06:18

## 2018-03-20 RX ADMIN — ASPIRIN 325 MG ORAL TABLET 325 MG: 325 PILL ORAL at 08:41

## 2018-03-20 RX ADMIN — GADOTERATE MEGLUMINE 15 ML: 376.9 INJECTION INTRAVENOUS at 14:58

## 2018-03-20 RX ADMIN — INSULIN LISPRO 3 UNITS: 100 INJECTION, SOLUTION INTRAVENOUS; SUBCUTANEOUS at 12:44

## 2018-03-20 RX ADMIN — HEPARIN SODIUM 5000 UNITS: 5000 INJECTION, SOLUTION INTRAVENOUS; SUBCUTANEOUS at 22:02

## 2018-03-20 RX ADMIN — ATORVASTATIN CALCIUM 80 MG: 40 TABLET, FILM COATED ORAL at 22:03

## 2018-03-20 NOTE — ACP (ADVANCE CARE PLANNING)
Patient has designated _______niece_________________ to participate in his/her discharge plan.     Name: Kami Vital  Address:  [place]

## 2018-03-20 NOTE — PROGRESS NOTES
conducted an initial consultation and Spiritual Assessment for Marya Yo, who is a 81 y.o.,female. Patients Primary Language is: [place]. According to the patients EMR Congregational Affiliation is: Mandaeism. The reason the Patient came to the hospital is:   Patient Active Problem List    Diagnosis Date Noted    TIA (transient ischemic attack) 03/20/2018    Chest pain 12/08/2014    Chest pain at rest 12/08/2014    Uncontrolled diabetes mellitus ([address] 03/25/2014    Diabetes     Hypertension     Dyslipidemia     Coronary artery disease         The  provided the following Interventions:  Initiated a relationship of care and support with patient in room 2108 this morning. Found patient setting in a chair watching television. Patient talked about being here and how  things got out of control in life thus being here. Provided information about Spiritual Care Services. Offered prayer and assurance of continued prayers on patients behalf. The following outcomes were achieved:  Patient shared limited information about her medical narrative and spiritual journey/beliefs. Patient processed feeling about current hospitalization. Patient expressed gratitude for pastoral care visit. Assessment:  Patient does not have any Advent/cultural needs that will affect patients preferences in health care. There are no further spiritual or Advent issues which require Spiritual Care Services interventions at this time. Plan:  Chaplains will continue to follow and will provide pastoral care on an as needed/requested basis    . Rosy Rico   [facility] [place] [facility]   [phone]

## 2018-03-20 NOTE — ED PROVIDER NOTES
HPI Comments: Slim Agustin is a 81 y.o. Female with h/o mi, stent with c/o onset of left arm numbness weakness that started while sitting in chair about 9pm. No known trigger. No associated sob, chest discomfort, sweats, syncope, near syncope, neck, head pain, other weakness, visual disturbance, diff swallowing, speech issue. Took 2 baby aspirin and called ems who also gave her aspirin as well. Sx now improving  In left arm. Pt has similar presentation to when she had mi 5 years ago which req a stent. Pt states she only takes aspirin occ. Also smokes for many years. No recent bagley, exertional cp    The history is provided by the patient and medical records. Past Medical History:   Diagnosis Date    Arthritis     Bilateral cataracts     sees Dr. Neva Marquez    COPD     Coronary artery disease     Cx - 2.75 x 33mm XIENCE 10/13     Diabetes     Dyslipidemia     Hypertension     MI (myocardial infarction)     Noncompliance     Obesity        Past Surgical History:   Procedure Laterality Date    HX CORONARY STENT PLACEMENT  2013         Family History:   Problem Relation Age of Onset    Arthritis-osteo Mother     Heart Disease Mother     Alcohol abuse Neg Hx     Asthma Neg Hx     Bleeding Prob Neg Hx     Cancer Neg Hx     Diabetes Neg Hx     Elevated Lipids Neg Hx     Headache Neg Hx     Hypertension Neg Hx     Lung Disease Neg Hx     Migraines Neg Hx     Psychiatric Disorder Neg Hx     Stroke Neg Hx     Mental Retardation Neg Hx        Social History     Social History    Marital status:      Spouse name: N/A    Number of children: N/A    Years of education: N/A     Occupational History    Not on file.      Social History Main Topics    Smoking status: Former Smoker     Packs/day: 1.00     Years: 54.00     Start date: 2/10/2014    Smokeless tobacco: Never Used      Comment: pt states she uses e-cigs to stop smoking    Alcohol use No    Drug use: No    Sexual activity: Not Currently     Other Topics Concern    Not on file     Social History Narrative         ALLERGIES: Review of patient's allergies indicates no known allergies. Review of Systems   Constitutional: Negative for fever. HENT: Negative for sore throat and trouble swallowing. Eyes: Negative for visual disturbance. Respiratory: Negative for cough and shortness of breath. Cardiovascular: Negative for chest pain. Gastrointestinal: Negative for abdominal pain. Endocrine: Negative for polyuria. Genitourinary: Negative for difficulty urinating. Musculoskeletal: Negative for gait problem. Skin: Negative for rash. Allergic/Immunologic: Negative for immunocompromised state. Neurological: Positive for numbness. Negative for dizziness, syncope, facial asymmetry, speech difficulty, light-headedness and headaches. Hematological: Does not bruise/bleed easily. Psychiatric/Behavioral: Negative for confusion. Vitals:    03/20/18 0016 03/20/18 0020 03/20/18 0030 03/20/18 0040   BP: 157/73  186/75 166/78   Pulse: 61 64 72 63   Resp:   16 17   Temp:       SpO2:  99% 98% 99%   Weight:       Height:                Physical Exam   Constitutional: She is oriented to person, place, and time. She appears well-developed and well-nourished. No distress. HENT:   Head: Normocephalic and atraumatic. Right Ear: External ear normal.   Left Ear: External ear normal.   Nose: Nose normal.   Mouth/Throat: Uvula is midline, oropharynx is clear and moist and mucous membranes are normal.   Eyes: Conjunctivae are normal. No scleral icterus. Neck: Neck supple. Cardiovascular: Normal rate, regular rhythm, normal heart sounds and intact distal pulses. Pulmonary/Chest: Effort normal and breath sounds normal.   Abdominal: Soft. There is no tenderness. Musculoskeletal: She exhibits no edema. Neurological: She is alert and oriented to person, place, and time. No cranial nerve deficit (3-12).  Gait normal.   subj tingling numbness left am with slight drift, weakness  Also with slight weakness left leg. No sensory complaint     Skin: Skin is warm and dry. She is not diaphoretic. Psychiatric: Her behavior is normal.   Nursing note and vitals reviewed.        [facility]      ED Course       Procedures  Vitals:  Patient Vitals for the past 12 hrs:   Temp Pulse Resp BP SpO2   03/20/18 0040 - 63 17 166/78 99 %   03/20/18 0030 - 72 16 186/75 98 %   03/20/18 0020 - 64 - - 99 %   03/20/18 0016 - 61 - 157/73 -   03/20/18 0010 - 61 - 157/73 100 %   03/20/18 0001 - 66 - 153/81 99 %   03/19/18 2330 - 64 - 197/84 99 %   03/19/18 2320 - 68 - 187/74 100 %   03/19/18 2314 - 75 - - 100 %   03/19/18 2312 - - - 191/76 -   03/19/18 2230 - 64 - - 99 %   03/19/18 2220 - 68 - 152/68 99 %   03/19/18 2210 - 70 - 154/59 99 %   03/19/18 2155 - 65 - - 99 %   03/19/18 2152 - - - 161/61 -   03/19/18 2150 98.1 °F (36.7 °C) 68 18 161/61 99 %         Medications ordered:   Medications   0.9% sodium chloride infusion (75 mL/hr IntraVENous New Bag 3/19/18 2349)   clopidogrel (PLAVIX) tablet 300 mg (not administered)   iopamidol (ISOVUE 300) 61 % contrast injection 100 mL (60 mL IntraVENous Given 3/20/18 0036)   0.9% sodium chloride infusion 100 mL (100 mL IntraVENous New Bag 3/19/18 2258)   atorvastatin (LIPITOR) tablet 40 mg (40 mg Oral Given 3/19/18 2346)   insulin regular (NOVOLIN R, HUMULIN R) injection 4 Units (4 Units IntraVENous Given 3/19/18 2346)   nitroglycerin (NITROBID) 2 % ointment 1 Inch (1 Inch Topical Given 3/20/18 0016)         Lab findings:  Recent Results (from the past 12 hour(s))   METABOLIC PANEL, COMPREHENSIVE    Collection Time: 03/19/18 10:00 PM   Result Value Ref Range    Sodium 139 136 - 145 mmol/L    Potassium 4.3 3.5 - 5.5 mmol/L    Chloride 102 100 - 108 mmol/L    CO2 28 21 - 32 mmol/L    Anion gap 9 3.0 - 18 mmol/L    Glucose 304 (H) 74 - 99 mg/dL    BUN 22 (H) 7.0 - 18 MG/DL    Creatinine 1.16 0.6 - 1.3 MG/DL    BUN/Creatinine ratio 19 12 - 20      GFR est AA 56 (L) >60 ml/min/1.73m2    GFR est non-AA 46 (L) >60 ml/min/1.73m2    Calcium 8.6 8.5 - 10.1 MG/DL    Bilirubin, total 0.3 0.2 - 1.0 MG/DL    ALT (SGPT) 19 13 - 56 U/L    AST (SGOT) 19 15 - 37 U/L    Alk. phosphatase 125 (H) 45 - 117 U/L    Protein, total 7.0 6.4 - 8.2 g/dL    Albumin 3.0 (L) 3.4 - 5.0 g/dL    Globulin 4.0 2.0 - 4.0 g/dL    A-G Ratio 0.8 0.8 - 1.7     PROTHROMBIN TIME + INR    Collection Time: 03/19/18 10:00 PM   Result Value Ref Range    Prothrombin time 13.5 11.5 - 15.2 sec    INR 1.1 0.8 - 1.2     THROMBIN TIME    Collection Time: 03/19/18 10:00 PM   Result Value Ref Range    Thrombin time 16.7 13.8 - 18.2 SECS   FIBRINOGEN    Collection Time: 03/19/18 10:00 PM   Result Value Ref Range    Fibrinogen 706 (H) 210 - 451 mg/dL   ASPIRIN TEST    Collection Time: 03/19/18 10:00 PM   Result Value Ref Range    Aspirin test 415 (L) 620 - 672 ARU   CBC WITH AUTOMATED DIFF    Collection Time: 03/19/18 10:00 PM   Result Value Ref Range    WBC 6.1 4.6 - 13.2 K/uL    RBC 5.07 4.20 - 5.30 M/uL    HGB 15.1 12.0 - 16.0 g/dL    HCT 44.1 35.0 - 45.0 %    MCV 87.0 74.0 - 97.0 FL    MCH 29.8 24.0 - 34.0 PG    MCHC 34.2 31.0 - 37.0 g/dL    RDW 12.5 11.6 - 14.5 %    PLATELET 846 304 - 926 K/uL    MPV 11.4 9.2 - 11.8 FL    NEUTROPHILS 69 40 - 73 %    LYMPHOCYTES 20 (L) 21 - 52 %    MONOCYTES 10 3 - 10 %    EOSINOPHILS 1 0 - 5 %    BASOPHILS 0 0 - 2 %    ABS. NEUTROPHILS 4.2 1.8 - 8.0 K/UL    ABS. LYMPHOCYTES 1.2 0.9 - 3.6 K/UL    ABS. MONOCYTES 0.6 0.05 - 1.2 K/UL    ABS. EOSINOPHILS 0.0 0.0 - 0.4 K/UL    ABS.  BASOPHILS 0.0 0.0 - 0.06 K/UL    DF AUTOMATED     TROPONIN I    Collection Time: 03/19/18 10:00 PM   Result Value Ref Range    Troponin-I, Qt. <0.02 0.0 - 0.045 NG/ML   TYPE & SCREEN    Collection Time: 03/19/18 10:14 PM   Result Value Ref Range    Crossmatch Expiration 03/22/2018     ABO/Rh(D) A POSITIVE     Antibody screen NEG    POC CHEM8    Collection Time: 03/19/18 10:34 PM Result Value Ref Range    CO2, POC 29 (H) 19 - 24 MMOL/L    Glucose,  (H) 74 - 106 MG/DL    BUN, POC 27 (H) 7 - 18 MG/DL    Creatinine, POC 1.1 0.6 - 1.3 MG/DL    GFRAA, POC 60 (L) >60 ml/min/1.73m2    GFRNA, POC 49 (L) >60 ml/min/1.73m2    Sodium,  136 - 145 MMOL/L    Potassium, POC 4.3 3.5 - 5.5 MMOL/L    Calcium, ionized (POC) 1.08 (L) 1.12 - 1.32 MMOL/L    Chloride,  100 - 108 MMOL/L    Anion gap, POC 14 10 - 20      Hematocrit, POC 39 36 - 49 %    Hemoglobin, POC 13.3 12 - 16 G/DL   EKG, 12 LEAD, INITIAL    Collection Time: 03/19/18 10:40 PM   Result Value Ref Range    Ventricular Rate 60 BPM    Atrial Rate 60 BPM    P-R Interval 354 ms    QRS Duration 128 ms    Q-T Interval 438 ms    QTC Calculation (Bezet) 438 ms    Calculated P Axis 87 degrees    Calculated R Axis -56 degrees    Calculated T Axis 31 degrees    Diagnosis       Sinus rhythm with 1st degree AV block  Right bundle branch block  Left anterior fascicular block  Bifascicular block  Voltage criteria for left ventricular hypertrophy  Abnormal ECG  When compared with ECG of 03-NOV-2017 02:52,  Sinus rhythm is no longer with 2nd degree AV block (Mobitz I)         EKG interpretation by ED Physician:  Sinus with 1st avb  Rbbb, lafb  No acute st tw changes  Rate 60, pr 354, qtc 438  Not sig changed except for now in 1st avb not 2nd degree      X-Ray, CT or other radiology findings or impressions:  CT HEAD WO CONT   Final Result      XR CHEST PORT    (Results Pending)   CTA HEAD NECK W CONT    (Results Pending)   mult stenotic lesions in brain, most specifically m2 segment left post cerebral with near occlusive stenosis      Progress notes, Consult notes or additional Procedure notes:   Seen by dr Sukhi Lemus on call for teleneuro who offered pt tpa but refused. Her exam is also improving from onset.  He rec gettting cta head, neck which I d/w pt and niece and she is agreeable at this time  D/w Dr Lizet Ross results of cta who rec 300mg plavix now along with previous aspirin, lipitor  D/w Dr Raymundo Paredes on call for hospitalist who will admit      Reevaluation of patient:   stable    Disposition:  Diagnosis:   1. Transient cerebral ischemia, unspecified type        Disposition: admit    Follow-up Information     None            Patient's Medications   Start Taking    No medications on file   Continue Taking    ACETAMINOPHEN (TYLENOL EXTRA STRENGTH) 500 MG TABLET    Take 2 Tabs by mouth every six (6) hours as needed for Pain. ASPIRIN 81 MG CHEWABLE TABLET    Take 1 Tab by mouth daily. BLOOD-GLUCOSE METER MONITORING KIT    Use tid    GABAPENTIN (NEURONTIN) 300 MG CAPSULE    Take 1 capsule by mouth nightly. GLUCOSE 4 GRAM CHEWABLE TABLET    Take 4 tablets by mouth as needed (hypoglcemia). GLUCOSE BLOOD VI TEST STRIPS (ASCENSIA AUTODISC VI, ONE TOUCH ULTRA TEST VI) STRIP    by Does Not Apply route See Admin Instructions. Use tid    INSULIN ASPART (NOVOLOG FLEXPEN) 100 UNIT/ML FLEXPEN    3 Units by SubCUTAneous route Before breakfast, lunch, and dinner. INSULIN GLARGINE (LANTUS SOLOSTAR) 100 UNIT/ML (3 ML) PEN    10 Units by SubCUTAneous route daily. INSULIN NEEDLES, DISPOSABLE, (LITE TOUCH INSULIN PEN NEEDLES) 31 X 1/4 \" NDLE    1 each by Does Not Apply route five (5) times daily. INSULIN NEEDLES, DISPOSABLE, 31 X 5/16 \" NDLE    Use bid    INSULIN NEEDLES, DISPOSABLE, 31 X 5/16 \" NDLE    1qd    LANCETS MISC    Use tid    NITROGLYCERIN (NITROSTAT) 0.4 MG SL TABLET    1 Tab by SubLINGual route every five (5) minutes as needed for Chest Pain.    These Medications have changed    No medications on file   Stop Taking    No medications on file

## 2018-03-20 NOTE — H&P
Caden Santoyo 1945 [facility]  Hospitalist Division      History & Physical    Patient: Heath Cohn MRN: [id number]  CSN: [id number]    YOB: 1945  Age: 81 y.o. Sex: female    DOA: 3/19/2018 LOS:  LOS: 0 days        DOA: 3/19/2018        Assessment/Plan     Active Problems:    TIA (transient ischemic attack) (3/20/2018)        Plan:  1. TIA - Left arm weakness - now improved , Seen by tele neuro - recommended Admission for further eval   2. HTN - resume home meds, monitor BP   3. T2DM - resume lantus & Novolog , monitor BS   4. Morbid Obesity   5. Chronic smoker - counseled to quit smoking   6. H/o CAD - MI 5 yrs ago -   DVT Px - Heparin   FC                 HPI:     Heath Cohn is a 81 y.o. female who is being admitted for ? TIA   Pt mentions she had tingling & numbness of her Left hand / arm yesterday -- it has improved now -- she says it felt like this when she had her MI 5 yrs ago so she decided to come to the ER for further eval   No known trigger. No associated sob, chest discomfort, sweats, syncope, near syncope, neck, head pain, other weakness, visual disturbance, diff swallowing, speech issue.  Took 2 baby aspirin and called ems who brought her to the ER for further eval.   Seen by tele Neuro -- recommended admission for further eval.       Past Medical History:   Diagnosis Date    Arthritis     Bilateral cataracts     sees Dr. Jo Culp    COPD     Coronary artery disease     Cx - 2.75 x 33mm XIENCE 10/13     Diabetes     Dyslipidemia     Hypertension     MI (myocardial infarction)     Noncompliance     Obesity     TIA (transient ischemic attack) 3/20/2018       Past Surgical History:   Procedure Laterality Date    HX CORONARY STENT PLACEMENT  2013       Family History   Problem Relation Age of Onset    Arthritis-osteo Mother     Heart Disease Mother     Alcohol abuse Neg Hx     Asthma Neg Hx     Bleeding Prob Neg Hx     Cancer Neg Hx     Diabetes Neg Hx  Elevated Lipids Neg Hx     Headache Neg Hx     Hypertension Neg Hx     Lung Disease Neg Hx     Migraines Neg Hx     Psychiatric Disorder Neg Hx     Stroke Neg Hx     Mental Retardation Neg Hx        Social History     Social History    Marital status:      Spouse name: N/A    Number of children: N/A    Years of education: N/A     Social History Main Topics    Smoking status: Former Smoker     Packs/day: 1.00     Years: 54.00     Start date: 2/10/2014    Smokeless tobacco: Never Used      Comment: pt states she uses e-cigs to stop smoking    Alcohol use No    Drug use: No    Sexual activity: Not Currently     Other Topics Concern    Not on file     Social History Narrative       Prior to Admission medications    Medication Sig Start Date End Date Taking? Authorizing Provider   Lancets misc Use tid 12/22/14  Yes Gbay Raza MD   glucose blood VI test strips (ASCENSIA AUTODISC VI, ONE TOUCH ULTRA TEST VI) strip by Does Not Apply route See Admin Instructions. Use tid 12/22/14  Yes Gaby Raza MD   Blood-Glucose Meter monitoring kit Use tid 12/22/14  Yes Gaby Raza MD   insulin aspart (NOVOLOG FLEXPEN) 100 unit/mL flexpen 3 Units by SubCUTAneous route Before breakfast, lunch, and dinner. 12/9/14  Yes Kvng Wilkins MD   Insulin Needles, Disposable, (LITE TOUCH INSULIN PEN NEEDLES) 31 X 1/4 \" ndle 1 each by Does Not Apply route five (5) times daily. 12/9/14  Yes Kvng Wilkins MD   glucose 4 gram chewable tablet Take 4 tablets by mouth as needed (hypoglcemia). 12/9/14  Ash Wilkins MD   insulin glargine (LANTUS SOLOSTAR) 100 unit/mL (3 mL) pen 10 Units by SubCUTAneous route daily. 12/9/14  Yes Kvng Wilkins MD   gabapentin (NEURONTIN) 300 mg capsule Take 1 capsule by mouth nightly.  12/9/14  Ash Wilkins MD   Insulin Needles, Disposable, 31 X 5/16 \" ndle 1qd 12/3/14  Yes Gaby Raza MD   nitroglycerin (NITROSTAT) 0.4 mg SL tablet 1 Tab by SubLINGual route every five (5) minutes as needed for Chest Pain. 2/27/14  Yes Peter Jeffries MD   Insulin Needles, Disposable, 31 X 5/16 \" Ndle Use bid 10/23/13  Yes Kimberly Fernández MD   aspirin 81 mg chewable tablet Take 1 Tab by mouth daily. 10/17/13  Yes Tommie Zazueta MD   acetaminophen (TYLENOL EXTRA STRENGTH) 500 mg tablet Take 2 Tabs by mouth every six (6) hours as needed for Pain. 11/3/17   Bella Paez MD       No Known Allergies    Review of Systems  A comprehensive review of systems was negative except for that written in the History of Present Illness. Physical Exam:      Visit Vitals    /76 (BP 1 Location: Left arm, BP Patient Position: At rest)    Pulse (!) 58    Temp 98 °F (36.7 °C)    Resp 18    Ht 5' 3\" (1.6 m)    Wt 103.4 kg (228 lb)    SpO2 99%    Breastfeeding No    BMI 40.39 kg/m2       Physical Exam:    Gen: In general, this is a well nourished female in no acute distress  HEENT: Sclerae nonicteric. Oral mucous membranes moist. Dentition poor  Neck: Supple with midline trachea. CV: RRR without murmur or rub appreciated. Resp:Respirations are unlabored without use of accessory muscles. Lung fields bilaterally without wheezes or rhonchi. Abd: Soft, nontender, nondistended. Extrem: Extremities are warm, without cyanosis or clubbing. No pitting pretibial edema  Skin: Warm, no visible rashes. Neuro: Patient is alert, oriented, and cooperative. No obvious focal defects. Moves all 4 extremities.     Labs Reviewed:    Recent Results (from the past 24 hour(s))   METABOLIC PANEL, COMPREHENSIVE    Collection Time: 03/19/18 10:00 PM   Result Value Ref Range    Sodium 139 136 - 145 mmol/L    Potassium 4.3 3.5 - 5.5 mmol/L    Chloride 102 100 - 108 mmol/L    CO2 28 21 - 32 mmol/L    Anion gap 9 3.0 - 18 mmol/L    Glucose 304 (H) 74 - 99 mg/dL    BUN 22 (H) 7.0 - 18 MG/DL    Creatinine 1.16 0.6 - 1.3 MG/DL    BUN/Creatinine ratio 19 12 - 20      GFR est AA 56 (L) >60 ml/min/1.73m2    GFR est non-AA 46 (L) >60 ml/min/1.73m2    Calcium 8.6 8.5 - 10.1 MG/DL    Bilirubin, total 0.3 0.2 - 1.0 MG/DL    ALT (SGPT) 19 13 - 56 U/L    AST (SGOT) 19 15 - 37 U/L    Alk. phosphatase 125 (H) 45 - 117 U/L    Protein, total 7.0 6.4 - 8.2 g/dL    Albumin 3.0 (L) 3.4 - 5.0 g/dL    Globulin 4.0 2.0 - 4.0 g/dL    A-G Ratio 0.8 0.8 - 1.7     PROTHROMBIN TIME + INR    Collection Time: 03/19/18 10:00 PM   Result Value Ref Range    Prothrombin time 13.5 11.5 - 15.2 sec    INR 1.1 0.8 - 1.2     THROMBIN TIME    Collection Time: 03/19/18 10:00 PM   Result Value Ref Range    Thrombin time 16.7 13.8 - 18.2 SECS   FIBRINOGEN    Collection Time: 03/19/18 10:00 PM   Result Value Ref Range    Fibrinogen 706 (H) 210 - 451 mg/dL   ASPIRIN TEST    Collection Time: 03/19/18 10:00 PM   Result Value Ref Range    Aspirin test 415 (L) 620 - 672 ARU   CBC WITH AUTOMATED DIFF    Collection Time: 03/19/18 10:00 PM   Result Value Ref Range    WBC 6.1 4.6 - 13.2 K/uL    RBC 5.07 4.20 - 5.30 M/uL    HGB 15.1 12.0 - 16.0 g/dL    HCT 44.1 35.0 - 45.0 %    MCV 87.0 74.0 - 97.0 FL    MCH 29.8 24.0 - 34.0 PG    MCHC 34.2 31.0 - 37.0 g/dL    RDW 12.5 11.6 - 14.5 %    PLATELET 067 009 - 227 K/uL    MPV 11.4 9.2 - 11.8 FL    NEUTROPHILS 69 40 - 73 %    LYMPHOCYTES 20 (L) 21 - 52 %    MONOCYTES 10 3 - 10 %    EOSINOPHILS 1 0 - 5 %    BASOPHILS 0 0 - 2 %    ABS. NEUTROPHILS 4.2 1.8 - 8.0 K/UL    ABS. LYMPHOCYTES 1.2 0.9 - 3.6 K/UL    ABS. MONOCYTES 0.6 0.05 - 1.2 K/UL    ABS. EOSINOPHILS 0.0 0.0 - 0.4 K/UL    ABS.  BASOPHILS 0.0 0.0 - 0.06 K/UL    DF AUTOMATED     TROPONIN I    Collection Time: 03/19/18 10:00 PM   Result Value Ref Range    Troponin-I, Qt. <0.02 0.0 - 0.045 NG/ML   TYPE & SCREEN    Collection Time: 03/19/18 10:14 PM   Result Value Ref Range    Crossmatch Expiration 03/22/2018     ABO/Rh(D) A POSITIVE     Antibody screen NEG    POC CHEM8    Collection Time: 03/19/18 10:34 PM   Result Value Ref Range    CO2, POC 29 (H) 19 - 24 MMOL/L    Glucose,  (H) 74 - 106 MG/DL    BUN, POC 27 (H) 7 - 18 MG/DL    Creatinine, POC 1.1 0.6 - 1.3 MG/DL    GFRAA, POC 60 (L) >60 ml/min/1.73m2    GFRNA, POC 49 (L) >60 ml/min/1.73m2    Sodium,  136 - 145 MMOL/L    Potassium, POC 4.3 3.5 - 5.5 MMOL/L    Calcium, ionized (POC) 1.08 (L) 1.12 - 1.32 MMOL/L    Chloride,  100 - 108 MMOL/L    Anion gap, POC 14 10 - 20      Hematocrit, POC 39 36 - 49 %    Hemoglobin, POC 13.3 12 - 16 G/DL   EKG, 12 LEAD, INITIAL    Collection Time: 03/19/18 10:40 PM   Result Value Ref Range    Ventricular Rate 60 BPM    Atrial Rate 60 BPM    P-R Interval 354 ms    QRS Duration 128 ms    Q-T Interval 438 ms    QTC Calculation (Bezet) 438 ms    Calculated P Axis 87 degrees    Calculated R Axis -56 degrees    Calculated T Axis 31 degrees    Diagnosis       Sinus rhythm with 1st degree AV block  Right bundle branch block  Left anterior fascicular block  Bifascicular block  Voltage criteria for left ventricular hypertrophy  Abnormal ECG  When compared with ECG of 03-NOV-2017 02:52,  Sinus rhythm is no longer with 2nd degree AV block (Mobitz I)     TROPONIN I    Collection Time: 03/20/18  1:50 AM   Result Value Ref Range    Troponin-I, Qt. <0.02 0.0 - 0.045 NG/ML   GLUCOSE, POC    Collection Time: 03/20/18  4:32 AM   Result Value Ref Range    Glucose (POC) 156 (H) 70 - 110 mg/dL       Imaging Reviewed:    CT head - final report pending   CTA Head & neck - final report pending         Liv Mayo MD  3/20/2018, 3:15 AM

## 2018-03-20 NOTE — CONSULTS
Neurology Consult Note    Admit Date: 3/19/2018  Length of Stay: 0  Primary Care: Coreen Johnson MD   Principle Problem: TIA (transient ischemic attack)     Assessment:    Stroke     Plan:    Stroke work-up in progress. CTA showed mild to moderate stenosis of right carotid siphon, distal BA, RM!, proximal bilateral A2, and proximal bilateral P2. Incidental finding of 16mm hypodense lesion left thyroid. Defer further work-up if necessary to primary team.    . 4- atorvastatin 80mg in place  HgA1C 9.7- Lantus and lispro in place. Has been out of her insulin and testing strips for past few months. May need CM assistance with acquiring. MRI of brain is pending. TTE pending. History: Ms. Enrique Blake is a 69yo  female with PMH of HTN, DM2, COPD, CAD, dyslipidemia, and MI who presents with left arm numbness and weakness that started about 1.5 hours prior to arrival.  Acute stroke work-up was started in the ED. She was seen by teleneurology who recommended alteplase but the patient refused. She reports occasional bilateral throbbing frontal headaches. Frequent thirst and urination. Results reviewed:     CT Results (most recent):    CT Head: no acute abnormalities    CTA Head and Neck    Results from Hospital Encounter encounter on 03/19/18   CTA HEAD NECK W CONT   Narrative History: Left arm weakness and tingling, TIA versus CVA    EXAM:  CT angiogram of the head and neck with intravenous contrast.    COMPARISON: Correlation noncontrast CT head same day. TECHNIQUE:  Three-dimensional, maximum intensity projection, and curved planar  reformations were post-processed at a dedicated outside facility ([facility]). Stenoses are reported with reference to the downstream lumen  (\"NASCET\").       DOSE REDUCTION: One or more dose reduction techniques were used on this CT:  automated exposure control, adjustment of the mAs and/or kVp according to  patient's size, and iterative reconstruction techniques. The specific techniques  utilized on this CT exam have been documented in the patient's electronic  medical record.    _______________    FINDINGS:         ARTERIAL BOLUS QUALITY:  Satisfactory. AORTIC ARCH:  Classic 3 vessel arch anatomy, moderate distal right  subclavian artery atherosclerotic stenosis. No significant innominate stenosis. RIGHT CAROTID ARTERY:  No significant common carotid artery stenosis with  medial tortuosity carotid bifurcation. Eccentric calcified plaque proximal ICA,  less than 20% diameter stenosis by NASCET criteria. Mild to moderate  atherosclerotic carotid siphon stenosis. LEFT CAROTID ARTERY:  No significant common carotid artery stenosis with  medial tortuosity carotid bifurcation. Eccentric calcified plaque proximal ICA  less than 20% diameter stenosis by NASCET criteria. Moderate narrowing due to  focal tortuosity/kinking proximal cervical ICA. Mild atherosclerotic changes  left carotid siphon. VERTEBRAL ARTERIES:  Right vertebral dominant. No significant vertebral  artery stenosis or occlusion. BASILAR AND CEREBRAL ARTERIES: Patent right posterior communicating artery  and anterior communicating artery with developmentally hypoplastic right A1  segment. Moderate short segment stenosis mid to distal basilar artery. Moderate  stenosis short segment distal right M1 segment. Severe areas of stenosis  involving proximal bilateral A2 segments anterior cerebral arteries. Moderate to  severe areas of stenosis proximal bilateral P2 segment posterior cerebral  arteries. Additional suggestion of mild to moderate areas of stenosis proximal  M2/M3 MCA branches. No evidence of saccular aneurysm or vascular malformation. Dural venous sinuses unremarkable. NON-ANGIOGRAPHIC FINDINGS: No evidence of lung apical mass. No mass or  adenopathy soft tissues of neck.  Questionable 16mm diameter hypodense lesion mid  to lower pole left thyroid. Degenerative cervical spondylosis without high-grade  canal stenosis or acute osseous finding. Incidental periapical lucency involving  the root of one of the proximal right maxillary molars with artifact from dental  metal.    _______________         Impression IMPRESSION:        1. No significant cervical carotid stenosis, no significant vertebral stenosis. 2. Mild to moderate atherosclerotic right carotid siphon stenosis. Moderate  right subclavian artery stenosis. 3. Multifocal areas of likely atherosclerotic stenosis most pronounced mid to  distal basilar artery, right M1 segment, proximal bilateral A2 segments, and  proximal bilateral P2 segments. 4. Incidental periapical lucency one of proximal right maxillary molars. 5. Questionable 16mm hypodense lesion left thyroid. Further evaluation thyroid  ultrasound suggested. These results were sent at the time of the exam to the referring physician by  the [facility] on call radiologist.      MRI Results (most recent):  No results found for this or any previous visit.     Latest Hemoglobin A1C:  Lab Results   Component Value Date/Time    Hemoglobin A1c 9.7 (H) 03/20/2018 08:32 AM    Hemoglobin A1c (POC) 9.9 12/01/2014 12:08 PM       Latest Cardiology Procedure:  Results for orders placed or performed during the hospital encounter of 03/19/18   EKG, 12 LEAD, INITIAL   Result Value Ref Range    Ventricular Rate 60 BPM    Atrial Rate 60 BPM    P-R Interval 354 ms    QRS Duration 128 ms    Q-T Interval 438 ms    QTC Calculation (Bezet) 438 ms    Calculated P Axis 87 degrees    Calculated R Axis -56 degrees    Calculated T Axis 31 degrees    Diagnosis       Sinus rhythm with 1st degree AV block  Right bundle branch block  Left anterior fascicular block  Bifascicular block  Voltage criteria for left ventricular hypertrophy  Abnormal ECG  When compared with ECG of 03-NOV-2017 02:52,  Sinus rhythm is no longer with 2nd degree AV block (Robin I)         Important Labs:  No results found for: FOL, RBCF  Lab Results   Component Value Date/Time    Cholesterol, total 279 (H) 03/20/2018 08:32 AM    HDL Cholesterol 52 03/20/2018 08:32 AM    LDL, calculated 188.4 (H) 03/20/2018 08:32 AM    VLDL, calculated 38.6 03/20/2018 08:32 AM    Triglyceride 193 (H) 03/20/2018 08:32 AM    CHOL/HDL Ratio 5.4 (H) 03/20/2018 08:32 AM     Lab Results   Component Value Date/Time    TSH 1.67 03/20/2018 08:32 AM    Triiodothyronine (T3), free 2.5 03/20/2018 08:32 AM    T4, Free 1.4 03/20/2018 08:32 AM     No results found for: DS35, PHEN, PHENO, PHENT, DILF, DS39, PHENY, PTN, VALF2, VALAC, VALP, VALPR, DS6, CRBAM, CRBAMP, CARB2, XCRBAM  Discussed with:      Allergies: No Known Allergies   Review of Systems:    Y  N   Constitutional: [] [x] recent weight change  [] [] fever  [x] [] sleep difficulties   ENT/Mouth:  [] [] hearing loss  [] [x] swallowing problems  [] [x] slurred speech   Cardiovascular:  [] [x] chest pain   [] [x] palpitations    Respiratory: [] [x] cough with swallow  [] [x] shortness of breath  [] [x] sleep apnea  [] [] intubated   Gastrointestinal: [] [x] abdominal pain  [] [x] nausea   Genitourinary: [x] [] frequent urination  [] [x] incontinence    Musculoskeletal:   [] [x] joint pain  [] [x] muscle pain   Integument:   [] [x] rash/itching   Neurological:  [] [x] dizziness/vertigo  [] [x] sedation  [] [] confusion  [] [] agitation/combativeness  [] [] loss of consciousness  [x] [] numbness/tingling sensation  [] [] tremors  [x] [] weakness in limbs  [x] [] difficulty with balance  [x] [] frequent or recurring headaches  [] [] memory loss   [] [] comatose  [] [] seizures   Psychiatric:   [] [] depression  [] [] hallucinations   Endocrine: [] [] excessive thirst or urination   [] [] heat or cold intolerance   Hematologic/Lymphatic: [] [] bleeding tendency  [] [] enlarged lymph nodes     PMH:   Past Medical History:   Diagnosis Date    Arthritis     Bilateral cataracts     sees Dr. Patria Anton COPD     Coronary artery disease     Cx - 2.75 x 33mm XIENCE 10/13     Diabetes     Dyslipidemia     Hypertension     MI (myocardial infarction)     Noncompliance     Obesity     TIA (transient ischemic attack) 3/20/2018        Problem List: Principal Problem:    TIA (transient ischemic attack) (3/20/2018)    Active Problems:    Diabetes ()      Hypertension ()      Dyslipidemia ()        FH:   Family History   Problem Relation Age of Onset    Arthritis-osteo Mother     Heart Disease Mother     Alcohol abuse Neg Hx     Asthma Neg Hx     Bleeding Prob Neg Hx     Cancer Neg Hx     Diabetes Neg Hx     Elevated Lipids Neg Hx     Headache Neg Hx     Hypertension Neg Hx     Lung Disease Neg Hx     Migraines Neg Hx     Psychiatric Disorder Neg Hx     Stroke Neg Hx     Mental Retardation Neg Hx         SH:   Social History     Social History    Marital status:      Spouse name: N/A    Number of children: N/A    Years of education: N/A     Social History Main Topics    Smoking status: Former Smoker     Packs/day: 1.00     Years: 54.00     Start date: 2/10/2014    Smokeless tobacco: Never Used      Comment: pt states she uses e-cigs to stop smoking    Alcohol use No    Drug use: No    Sexual activity: Not Currently     Other Topics Concern    Not on file     Social History Narrative          Vital Signs:   Visit Vitals    /64    Pulse 64    Temp 98.1 °F (36.7 °C)    Resp 16    Ht 5' 3\" (1.6 m)    Wt 103.4 kg (228 lb)    SpO2 94%    Breastfeeding No    BMI 40.39 kg/m2      Neurological examination:    Appearance: In no acute distress, well developed, well nourished, cooperative   Cardiovascular: Heart is regular rate and rhythm. 1+ peripheral non pitting edema to right calf. No carotid bruits heard   Mental status examination: Alert and oriented X 4. Speech slight slurring but normal language.  Normal attention, memory and fund of knowledge.  Cranial Nerves:     I: smell Not tested   II: visual fields Visual fields were intact to confrontation  Eye movements were full and conjugate, saccades were accurate, pursuit movements were smooth, and there was no nystagmus. II: pupils Equal, round, reactive to light   III,VII: ptosis none   III,IV,VI: extraocular muscles  Full ROM   V: facial light touch sensation  normal   V,VII: corneal reflex     VII: facial muscle function  normal   VIII: hearing    IX: soft palate elevation  Normal. The palate and tongue moved in the midline. IX,X: gag reflex present   XI: sternocleidomastoid strength 5/5   XII: tongue strength  Normal.          Motor exam: Station, gait:  deferred. Muscle tone, bulk and manual muscle testing: LUE 4/5 and LLE 4+/5. Right side normal. Spacticity absent.  Sensory: Intact to primary modalities. Paraesthesia with sharp to bilateral lower extremities from mid-calf down.  Coordination: Normal rapid alternating movements, finger to nose testing.  Reflexes: Symmetric and 2+ in bilateral biceps, triceps, and brachioradialis. 1+ patellar and ankle reflexes. Plantar reflexes are flexor.            Medications:      [x] REVIEWED  Current Facility-Administered Medications   Medication    gabapentin (NEURONTIN) capsule 300 mg    nitroglycerin (NITROSTAT) tablet 0.4 mg    ondansetron (ZOFRAN) injection 2 mg    aspirin (ASPIRIN) tablet 325 mg    atorvastatin (LIPITOR) tablet 80 mg    acetaminophen (TYLENOL) tablet 650 mg    acetaminophen (TYLENOL) suppository 650 mg    senna-docusate (PERICOLACE) 8.6-50 mg per tablet 2 Tab    pantoprazole (PROTONIX) tablet 40 mg    Or    pantoprazole (PROTONIX) granules for oral suspension 40 mg    Or    pantoprazole (PROTONIX) 40 mg in sodium chloride 10 mL injection    albuterol (PROVENTIL VENTOLIN) nebulizer solution 2.5 mg    heparin (porcine) injection 5,000 Units    nicotine (NICODERM CQ) 14 mg/24 hr patch 1 Patch    insulin lispro (HUMALOG) injection    insulin glargine (LANTUS) injection 10 Units    folic acid (FOLVITE) tablet 1 mg    Thiamine Mononitrate (B-1) tablet 100 mg    insulin lispro (HUMALOG) injection 3 Units    0.9% sodium chloride infusion     Data:      [x] REVIEWED  Recent Results (from the past 24 hour(s))   METABOLIC PANEL, COMPREHENSIVE    Collection Time: 03/19/18 10:00 PM   Result Value Ref Range    Sodium 139 136 - 145 mmol/L    Potassium 4.3 3.5 - 5.5 mmol/L    Chloride 102 100 - 108 mmol/L    CO2 28 21 - 32 mmol/L    Anion gap 9 3.0 - 18 mmol/L    Glucose 304 (H) 74 - 99 mg/dL    BUN 22 (H) 7.0 - 18 MG/DL    Creatinine 1.16 0.6 - 1.3 MG/DL    BUN/Creatinine ratio 19 12 - 20      GFR est AA 56 (L) >60 ml/min/1.73m2    GFR est non-AA 46 (L) >60 ml/min/1.73m2    Calcium 8.6 8.5 - 10.1 MG/DL    Bilirubin, total 0.3 0.2 - 1.0 MG/DL    ALT (SGPT) 19 13 - 56 U/L    AST (SGOT) 19 15 - 37 U/L    Alk.  phosphatase 125 (H) 45 - 117 U/L    Protein, total 7.0 6.4 - 8.2 g/dL    Albumin 3.0 (L) 3.4 - 5.0 g/dL    Globulin 4.0 2.0 - 4.0 g/dL    A-G Ratio 0.8 0.8 - 1.7     PROTHROMBIN TIME + INR    Collection Time: 03/19/18 10:00 PM   Result Value Ref Range    Prothrombin time 13.5 11.5 - 15.2 sec    INR 1.1 0.8 - 1.2     THROMBIN TIME    Collection Time: 03/19/18 10:00 PM   Result Value Ref Range    Thrombin time 16.7 13.8 - 18.2 SECS   FIBRINOGEN    Collection Time: 03/19/18 10:00 PM   Result Value Ref Range    Fibrinogen 706 (H) 210 - 451 mg/dL   ASPIRIN TEST    Collection Time: 03/19/18 10:00 PM   Result Value Ref Range    Aspirin test 415 (L) 620 - 672 ARU   CBC WITH AUTOMATED DIFF    Collection Time: 03/19/18 10:00 PM   Result Value Ref Range    WBC 6.1 4.6 - 13.2 K/uL    RBC 5.07 4.20 - 5.30 M/uL    HGB 15.1 12.0 - 16.0 g/dL    HCT 44.1 35.0 - 45.0 %    MCV 87.0 74.0 - 97.0 FL    MCH 29.8 24.0 - 34.0 PG    MCHC 34.2 31.0 - 37.0 g/dL    RDW 12.5 11.6 - 14.5 %    PLATELET 393 944 - 489 K/uL    MPV 11.4 9.2 - 11.8 FL    NEUTROPHILS 69 40 - 73 %    LYMPHOCYTES 20 (L) 21 - 52 %    MONOCYTES 10 3 - 10 %    EOSINOPHILS 1 0 - 5 %    BASOPHILS 0 0 - 2 %    ABS. NEUTROPHILS 4.2 1.8 - 8.0 K/UL    ABS. LYMPHOCYTES 1.2 0.9 - 3.6 K/UL    ABS. MONOCYTES 0.6 0.05 - 1.2 K/UL    ABS. EOSINOPHILS 0.0 0.0 - 0.4 K/UL    ABS.  BASOPHILS 0.0 0.0 - 0.06 K/UL    DF AUTOMATED     TROPONIN I    Collection Time: 03/19/18 10:00 PM   Result Value Ref Range    Troponin-I, Qt. <0.02 0.0 - 0.045 NG/ML   TYPE & SCREEN    Collection Time: 03/19/18 10:14 PM   Result Value Ref Range    Crossmatch Expiration 03/22/2018     ABO/Rh(D) A POSITIVE     Antibody screen NEG    POC CHEM8    Collection Time: 03/19/18 10:34 PM   Result Value Ref Range    CO2, POC 29 (H) 19 - 24 MMOL/L    Glucose,  (H) 74 - 106 MG/DL    BUN, POC 27 (H) 7 - 18 MG/DL    Creatinine, POC 1.1 0.6 - 1.3 MG/DL    GFRAA, POC 60 (L) >60 ml/min/1.73m2    GFRNA, POC 49 (L) >60 ml/min/1.73m2    Sodium,  136 - 145 MMOL/L    Potassium, POC 4.3 3.5 - 5.5 MMOL/L    Calcium, ionized (POC) 1.08 (L) 1.12 - 1.32 MMOL/L    Chloride,  100 - 108 MMOL/L    Anion gap, POC 14 10 - 20      Hematocrit, POC 39 36 - 49 %    Hemoglobin, POC 13.3 12 - 16 G/DL   EKG, 12 LEAD, INITIAL    Collection Time: 03/19/18 10:40 PM   Result Value Ref Range    Ventricular Rate 60 BPM    Atrial Rate 60 BPM    P-R Interval 354 ms    QRS Duration 128 ms    Q-T Interval 438 ms    QTC Calculation (Bezet) 438 ms    Calculated P Axis 87 degrees    Calculated R Axis -56 degrees    Calculated T Axis 31 degrees    Diagnosis       Sinus rhythm with 1st degree AV block  Right bundle branch block  Left anterior fascicular block  Bifascicular block  Voltage criteria for left ventricular hypertrophy  Abnormal ECG  When compared with ECG of 03-NOV-2017 02:52,  Sinus rhythm is no longer with 2nd degree AV block (Mobitz I)     TROPONIN I    Collection Time: 03/20/18  1:50 AM   Result Value Ref Range    Troponin-I, Qt. <0.02 0.0 - 0.045 NG/ML   GLUCOSE, POC    Collection Time: 03/20/18  4:32 AM   Result Value Ref Range    Glucose (POC) 156 (H) 70 - 110 mg/dL   GLUCOSE, POC    Collection Time: 03/20/18  7:54 AM   Result Value Ref Range    Glucose (POC) 213 (H) 70 - 110 mg/dL   LIPID PANEL    Collection Time: 03/20/18  8:32 AM   Result Value Ref Range    LIPID PROFILE          Cholesterol, total 279 (H) <200 MG/DL    Triglyceride 193 (H) <150 MG/DL    HDL Cholesterol 52 40 - 60 MG/DL    LDL, calculated 188.4 (H) 0 - 100 MG/DL    VLDL, calculated 38.6 MG/DL    CHOL/HDL Ratio 5.4 (H) 0 - 5.0     HEMOGLOBIN A1C WITH EAG    Collection Time: 03/20/18  8:32 AM   Result Value Ref Range    Hemoglobin A1c 9.7 (H) 4.2 - 5.6 %    Est. average glucose 232 mg/dL   SED RATE (ESR)    Collection Time: 03/20/18  8:32 AM   Result Value Ref Range    Sed rate, automated 52 (H) 0 - 30 mm/hr   TSH 3RD GENERATION    Collection Time: 03/20/18  8:32 AM   Result Value Ref Range    TSH 1.67 0.36 - 3.74 uIU/mL   T3, FREE    Collection Time: 03/20/18  8:32 AM   Result Value Ref Range    Triiodothyronine (T3), free 2.5 2.3 - 4.2 PG/ML   T4, FREE    Collection Time: 03/20/18  8:32 AM   Result Value Ref Range    T4, Free 1.4 0.7 - 1.5 NG/DL       Elle Russell NP

## 2018-03-20 NOTE — DIABETES MGMT
NUTRITIONAL ASSESSMENT GLYCEMIC CONTROL/ PLAN OF CARE     Ron Bonilla           81 y.o.           3/19/2018                 1. Transient cerebral ischemia, unspecified type       INTERVENTIONS/PLAN:   1. Suggest increasing Lantus to 12 units daily. 2. Left written information on diabetes nutrition and healthy meal planning at bedside with contact information. Will follow up later in day as time allows or 3/22. ASSESSMENT:   Nutritional Status:  Pt is 198% ideal wt. Po intake appears adequate per I/O's. Lipid profile noted. Nutrition Diagnoses: Altered nutrition related labs due to diabetes/dyslipidemia as evidenced by A1C of 9.7%/ LDL of 188.4 mg/dL and triglyceride of 192 mg/dL. Morbid obesity due to excess energy intake as evidenced by BMI of 40.4 kg/m2. Diabetes Management:   Pt received DM education this morning from CDE RN. BG elevated. Chart review indicates pt received education at prior admission 10/17/13 and 12/9/14 by glycemic control. Recent blood glucose:   3/20/18:  156, 213, 310    Within target range (non-ICU: <140; ICU<180): [] Yes   [x]  No    Current Insulin regimen:   Lantus, 10 units/d  Lispro 3 units TID before meals  Corrective lispro,very insulin resistant dosing ACHS  Home medication/insulin regimen: none as reported to glycemic control RN by pt (but has taken DM medications including insulin in the past). HbA1c:9.7% - ave BG has been ~ 232 mg/dL over past 3 months  Adequate glycemic control PTA:  [] Yes  [x] No       SUBJECTIVE/OBJECTIVE:   Information obtained from:  Chart review; Pt off unit at this time. Pt admitted with TIA and PMHx including HTN, T2DM, COPD, CAD, dyslipidemia, MI.     Diet: consistent CHO 4189-6377 calories 2 gram Na    Patient Vitals for the past 100 hrs:   % Diet Eaten   03/20/18 0858 75 %       Medications: [x]                Reviewed     Most Recent POC Glucose:   Recent Labs      03/19/18   2200   GLU  304*         Labs:   Lab Results Component Value Date/Time    Hemoglobin A1c 9.7 (H) 03/20/2018 08:32 AM     Lab Results   Component Value Date/Time    Hemoglobin A1c 9.7 (H) 03/20/2018 08:32 AM    Hemoglobin A1c 10.6 (H) 12/08/2014 09:37 AM    Hemoglobin A1c 11.1 (H) 10/16/2013 06:15 AM     Lab Results   Component Value Date/Time    Sodium 139 03/19/2018 10:00 PM    Potassium 4.3 03/19/2018 10:00 PM    Chloride 102 03/19/2018 10:00 PM    CO2 28 03/19/2018 10:00 PM    Anion gap 9 03/19/2018 10:00 PM    Glucose 304 (H) 03/19/2018 10:00 PM    BUN 22 (H) 03/19/2018 10:00 PM    Creatinine 1.16 03/19/2018 10:00 PM    Calcium 8.6 03/19/2018 10:00 PM    Magnesium 2.3 07/14/2017 11:52 PM    Albumin 3.0 (L) 03/19/2018 10:00 PM     Lab Results   Component Value Date/Time    Cholesterol, total 279 (H) 03/20/2018 08:32 AM    HDL Cholesterol 52 03/20/2018 08:32 AM    LDL, calculated 188.4 (H) 03/20/2018 08:32 AM    VLDL, calculated 38.6 03/20/2018 08:32 AM    Triglyceride 193 (H) 03/20/2018 08:32 AM    CHOL/HDL Ratio 5.4 (H) 03/20/2018 08:32 AM       Anthropometrics: IBW : 52.2 kg (115 lb), % IBW (Calculated): 198.26 %, BMI (calculated): 40.4  Wt Readings from Last 1 Encounters:   03/19/18 103.4 kg (228 lb)      Ht Readings from Last 1 Encounters:   03/20/18 5' 3\" (1.6 m)       Estimated Nutrition Needs:  1828 Kcals/day, Protein (g): 62 g Fluid (ml): 1800 ml  Based on:   [x]          Actual BW    []          ABW   []            Adjusted BW         Nutrition Interventions:  On-going diabetes education  Goal:   Blood glucose will be within target range of  mg/dL by 3/25/18. Pt will maintain weight (+/- 1-2 kg) by 3/30/18 and slow, gradual wt loss of 1-2 lbs/week post discharge.         Nutrition Monitoring and Evaluation      [x]     Monitor po intake on meal rounds  [x]     Continue inpatient monitoring and intervention  []     Other:      Nutrition Risk:  []   High     []  Moderate    [x]  Minimal/Uncompromised    Min Bateman RD, CDE   Office: [facility] Pager:  [phone]

## 2018-03-20 NOTE — DIABETES MGMT
Diabetes Patient/Family Education Record    Factors That  May Influence Patients Ability  to Learn or  Comply with Recommendations   []   Language barrier    []   Cultural needs   []   Motivation    []   Cognitive limitation    []   Physical   []   Education    []   Physiological factors   []   Hearing/vision/speaking impairment   []   Judaism beliefs    [x]   Financial factors \"My social security check doesn't go very far. \"   []  Other:   []  No factors identified at this time. Person Instructed:   [x]   Patient   []   Family   []  Other     Preference for Learning:   [x]   Verbal   [x]   Written   []  Demonstration     Level of Comprehension & Competence:    [x]  Good                                      [] Fair                                     []  Poor                             []  Needs Reinforcement   [x]  Teachback completed    Education Component:   [x]  Medication management, including how to administer insulin (if appropriate) and potential medication interactions\" I haven't been taking any medication. My insurance ran out. Now I have [employer]. I'm not sure what they cover, but I don't drive and haven't been able to get to the doctor. \"  Lives alone. Has a niece and nephew in the area, but they have busy schedules. Hx type 2 diabetes x 20 years. Hx taking insulin. Prefers insulin pens. Reviewed correct pen technique with patient. Patient states her new PCP will be Dr. Salma Maldonado across the street. [x]  Nutritional management stays away from sweetened beverages, but has a hard time eating healthy because \"healthy food is expensive\".   Reviewed approp portion sizes with patient.   []  Exercise   [x]  Signs, symptoms, and treatment of hyperglycemia and hypoglycemia   [x] Prevention, recognition and treatment of hyperglycemia and hypoglycemia   [x]  Importance of blood glucose monitoring and how to obtain a blood glucose meter has a glucometer at home, but ran out of strips. She used to check her BG 4-5 times daily. Pt. unsure if new insurance will cover strips. Presto glucometer given to patient along with an extra 50 test strips and 100 lancets to get her started. Encouraged patient to check her BG at least 2 times daily and as needed. []  Instruction on use of the blood glucose meter   [x]  Discuss the importance of HbA1C monitoring 9.7 % equivalent to an average blood glucose of 232 mg/dl over the past 2-3 months.    [x]  Sick day guidelines   []  Proper use and disposal of lancets, needles, syringes or insulin pens (if appropriate)   [x]  Potential long-term complications (retinopathy, kidney disease, neuropathy, foot care)   [x] Information about whom to contact in case of emergency or for more information    [x]  Goal:  Patient/family will demonstrate understanding of Diabetes Self Management Skills by: (date) __3/25/18_____  Plan for post-discharge education or self-management support:    [x] Outpatient class schedule provided            [x] Patient Declined    [] Scheduled for outpatient classes (date) _______  Patient does not have transportation   [facility], RN SERVANDO  pgr [phone]  Ext. [phone]

## 2018-03-20 NOTE — PROGRESS NOTES
Problem: Dysphagia (Adult)  Goal: *Acute Goals and Plan of Care (Insert Text)  Recommendations:  Diet: Regular/thin  Meds: Per pt preference  Aspiration Precautions  Oral Care TID    Goals:  Patient will:  1. Tolerate PO trials with 0 s/s overt distress in 4/5 trials  2. Utilize compensatory swallow strategies/maneuvers (decrease bite/sip, size/rate, alt. liq/sol) with min cues in 4/5 trials        Outcome: Progressing Towards Goal    Speech LAnguage Pathology bedside swallow   evaluation & TREATMENT     Patient: Zuri Ybarra (89 y.o. female)  Date: 3/20/2018  Primary Diagnosis: TIA (transient ischemic attack)        Precautions:   (none)  PLOF: Independent, regular diet  ASSESSMENT :  Based on the objective data described below, the patient presents with minimal oropharyngeal dysphagia. Pt A&O x 4 with daughter present at bedside. Oral-motor exam revealed structures grossly intact for mastication and deglutition. Pt reported eating a regular diet at home. Pt accepted self-fed thin liquid via straw with no overt s/s of aspiration. Solid cracker trial; pt with min delayed swallow and oral residue, cleared independently with thin liquid. ST recommending continuation of regular solid/thin liquid diet. Skilled therapy initiated; educated pt on aspiration precautions and importance of compensatory swallow techniques to decrease aspiration risk (decrease rate of intake & sip/bite size, upright @HOB for all po intake and ~30 minutes after po); verbalized comprehension. Patient will benefit from skilled intervention to address the above impairments.   Patients rehabilitation potential is considered to be Good  Factors which may influence rehabilitation potential include:   [x]            None noted  []            Mental ability/status  []            Medical condition  []            Home/family situation and support systems  []            Safety awareness  []            Pain tolerance/management  [] Other: PLAN :  Recommendations and Planned Interventions:  Regular/thin liquid  Frequency/Duration: Patient will be followed by speech-language pathology 1-2 times to address goals. Discharge Recommendations: To Be Determined     SUBJECTIVE:   Patient stated I feel better today. OBJECTIVE:     Past Medical History:   Diagnosis Date    Arthritis     Bilateral cataracts     sees Dr. Jo Culp    COPD     Coronary artery disease     Cx - 2.75 x 33mm XIENCE 10/13     Diabetes     Dyslipidemia     Hypertension     MI (myocardial infarction)     Noncompliance     Obesity     TIA (transient ischemic attack) 3/20/2018     Past Surgical History:   Procedure Laterality Date    HX CORONARY STENT PLACEMENT  2013     Prior Level of Function/Home Situation: Independent  Home Situation  Home Environment: Apartment  # Steps to Enter: 4  Rails to Enter: Yes  One/Two Story Residence: Two story, live on 1st floor  # of Interior Steps:  (0)  Height of Each Step (in): 0 inches  Interior Rails: None  Lift Chair Available: No  Living Alone: Yes  Support Systems: Friends \ neighbors  Patient Expects to be Discharged to[de-identified] Private residence  Current DME Used/Available at Home: None  Tub or Shower Type: Tub/Shower combination (no grab bars or shower chair)  Diet prior to admission: Regular  Current Diet:  Regular/thin   Cognitive and Communication Status:  Neurologic State: Alert  Orientation Level: Oriented X4  Cognition: Appropriate decision making, Appropriate for age attention/concentration, Appropriate safety awareness, Follows commands  Perception: Appears intact  Perseveration: No perseveration noted  Safety/Judgement: Awareness of environment, Fall prevention  Oral Assessment:  Oral Assessment  Labial: No impairment  Dentition: Limited  Oral Hygiene: Good  Lingual: No impairment  Velum: Unable to visualize  Mandible: No impairment  P.O.  Trials:  Patient Position: Chair 90  Vocal quality prior to P.O.: Hoarse  Consistency Presented: Thin liquid; Solid  How Presented: Self-fed/presented; Successive swallows;Straw     Bolus Acceptance: No impairment  Bolus Formation/Control: No impairment     Propulsion: No impairment  Oral Residue: Lingual;Less than 10% of bolus  Initiation of Swallow: Delayed (# of seconds)  Laryngeal Elevation: Functional  Aspiration Signs/Symptoms: None  Pharyngeal Phase Characteristics: No impairment, issues, or problems   Effective Modifications: Alternate liquids/solids;Small sips and bites  Cues for Modifications: Minimal       Oral Phase Severity: Minimal  Pharyngeal Phase Severity : No impairment    GCODESwallowing:  Swallow Current Status CI= 1-19%   Swallow Goal Status CH= 0%    The severity rating is based on the following outcomes:  BRANDEN Noms Swallow Level 6    Clinical Judgement    PAIN:  Start of Eval/Tx: 0  End of Eval/Tx: 0     After treatment:   [x]            Patient left in no apparent distress sitting up in chair  []            Patient left in no apparent distress in bed  []            Call bell left within reach  []            Nursing notified  [x]            Family present  []            Caregiver present  []            Bed alarm activated    COMMUNICATION/EDUCATION:   [x]            Aspiration precautions; swallow safety; compensatory techniques. [x]            Patient/family have participated as able in goal setting and plan of care. [x]            Patient/family agree to work toward stated goals and plan of care. []            Patient understands intent and goals of therapy; neutral about participation. []            Patient unable to participate in goal setting/plan of care; educ ongoing with interdisciplinary staff  []         Posted safety precautions in patient's room.     Thank you for this referral.  BRENDEN Bailey  Time Calculation: 20 mins  Evaluation Time: 10 minutes   Treatment Time: 10 minutes

## 2018-03-20 NOTE — PROGRESS NOTES
1422  Pt in MRI. Medicated with Ativan. 1610  Pt back from MRI. Pt dizzy at this time. Pt reports feeling dizzy since Ativan given. Up in chair. [address] [facility] [address] Sandoval Headings of pt's glucose of 80; held additional 3 units Humalog per order. Bedside and Verbal shift change report given to Hayden Nicholas by Radha Cowan RN. Report included the following information SBAR, Kardex, Intake/Output and MAR.

## 2018-03-20 NOTE — PROGRESS NOTES
Problem: Falls - Risk of  Goal: *Absence of Falls  Document Elena Fall Risk and appropriate interventions in the flowsheet.    Outcome: Progressing Towards Goal  Fall Risk Interventions:                      History of Falls Interventions: Door open when patient unattended, Evaluate medications/consider consulting pharmacy, Investigate reason for fall

## 2018-03-20 NOTE — PROGRESS NOTES
Early AM admit for TIA. Neuro consulted.  MRI today    Active Hospital Problems    Diagnosis Date Noted    TIA (transient ischemic attack) 03/20/2018    Hypertension     Dyslipidemia     Diabetes      Samantha Candelario DO  Internal Medicine, Hospitalist  Pager: [phone]  [facility]

## 2018-03-20 NOTE — ED NOTES
Patient has not had any medication since a month or two ago due to a recent relapse in insurance coverage.

## 2018-03-20 NOTE — ED TRIAGE NOTES
45 minutes ago patient experienced left arm weakness and tingling. Feeling reminded patient of a previous heart attack. Remains alert and oriented.

## 2018-03-20 NOTE — PROGRESS NOTES
[facility]   Discharge Planning/ Assessment    Reasons for Intervention: Chart reviewed and pt verified demographics. She has [employer]. She use to see Dr Loly Garcia but he is no longer in the practice. She will be going to see Dr John Miles after dc. She lives alone. Her next door neighbor and good friend Trisha Kaur [phone] will be checking in on her after dc. Her Niece Celena Saul [phone] will drive her home, and pt says she is the [facility]. She says she lives alone and independent with ADL. She states she never had home health or any DME. She mentioned she had a heart attack about 5 years ago. When talking about transistion plans, she refuses to go to snf or rehab, even with PT/OT reconmmendations. She says she can't leave her two cats. When I asked couldn't the neighbor watch the cats, she said no, they work and her cats need her. When asked about Home Health, she was hesitant, saying she might be sleeping or something when they came. She was not convinced even when I said that date and times could be worked on together. I asked her to think about it and to see what PT/OT recommended for her weaker left arm. Transition plan is home, possibly with Home Health PT/OT if needed.     High Risk Criteria  [] Yes  [x]No   Physician Referral  [] Yes  []No        Date    Nursing Referral  [] Yes  []No        Date    Patient/Family Request  [] Yes  []No        Date       Resources:    Medicare  [x] Yes  []No   Medicaid  [] Yes  []No   No Resources  [] Yes  []No   Private Insurance  [] Yes  []No    Name/Phone Number    Other  [] Yes  []No        (i.e. Workman's Comp)         Prior Services:    Prior Services  [] Yes  [x]No   Home Health  [] Yes  []No   [address] [place]  [] Yes  []No        Number of [address]  [] Yes  []No       Meals on Wheels  [] Yes  []No   Office on Aging  [] Yes  []No   Transportation Services  [] Yes []No   Nursing Home  [] Yes  []No        Nursing Home Name    [address]  [] Yes  []No        [address] Name    Other       Information Source:      Information obtained from  [x] Patient  [] Parent   [] [address]  [] Child  [] Spouse   [] Significant Other/Partner   [] Friend      [] EMS    [] Nursing Home Chart          [] Other:   Chart Review  [x] Yes  []No     Family/Support System:    Patient lives with  [x] Alone    [] Spouse   [] Significant Other  [] Children  [] Caretaker   [] Parent  [] Sibling     [] Other       Other Support System:    Is the patient responsible for care of others  [] Yes  [x]No   Information of person caring for patient on  discharge    Managers financial affairs independently  [x] Yes  []No   If no, explain:      Status Prior to Admission:    Mental Status  [x] Awake  [] Alert  [] Oriented  [] Quiet/Calm [] Lethargic/Sedated   [] Disoriented  [] Restless/Anxious  [] Combative   Personal Care  [] Dependent  [x] [address]  [] Requires Assistance   Meal Preparation Ability  [x] Independent   [] Standby Assistance   [] Minimal Assistance   [] Moderate Assistance  [] Maximum Assistance     [] Total Assistance   Chores  [x] Independent with Chores   [] N/A Nursing Home Resident   [] Requires Assistance   Bowel/Bladder  [x] Continent  [] Catheter  [] Incontinent  [] Ostomy Self-Care    [] Urine Diversion Self-Care  [] Maximum Assistance     [] Total Assistance   Number of Persons needed for assistance    DME at home  [] Ko Tim  [] Keegan Tim   [] Commode    [] Bathroom/Grab Bars  [] Hospital Bed  [] Nebulizer  [] Oxygen           [] Raised Toilet Seat  [] Shower Chair  [] Side Rails for Bed   [] Tub Transfer Bench   [] Robert Cluster  [] Joby Soria      [] Other:   Vendor      Treatment Presently Receiving:    Current Treatments  [] Chemotherapy  [] Dialysis  [] Insulin  [] IVAB [x] IVF   [] O2  [] PCA   [] PT   [] RT   [] Tube Feedings   [] Wound Care Psychosocial Evaluation:    Verbalized Knowledge of Disease Process  [] Patient  []Family   Coping with Disease Process  [] Patient  []Family   Requires Further Counseling Coping with Disease Process  [] Patient  []Family     Identified Projected Needs:    [address]  [] Yes  []No   Transportation  [] Yes  []No   Education  [] Yes  []No        Specific Education     Financial Counseling  [] Yes  []No   Inability to Care for Self/Will Require 24 hour care  [] Yes  []No   Pain Management  [] Yes  []No   Home Infusion Therapy  [] Yes  []No   Oxygen Therapy  [] Yes  []No   DME  [] Yes  []No   Long Term Care Placement  [] Yes  []No   Rehab  [] Yes  []No   Physical Therapy  [x] Yes  []No   Needs Anticipated At This Time  [x] Yes  []No     Intra-Hospital Referral:    [facility]  [] Yes  []No     [] Yes  []No   Patient Representative  [] Yes  []No   Staff for Teaching Needs  [] Yes  []No   Specialty Teaching Needs     Diabetic Educator  [] Yes  []No   Referral for Diabetic Educator Needed  [] Yes  []No  If Yes, place order for Nutritionist or Diabetic Consult     Tentative Discharge Plan:    Home with No Services  [] Yes  [x]No   Home with Home Health Follow-up  [x] Yes  []No        If Yes, specify type    Home Care Program  [] Yes  []No        If Yes, specify type    Meals on Wheels  [] Yes  []No   Office of Aging  [] Yes  []No   NHP  [] Yes  []No   Return to the Nursing Home  [] Yes  []No   Rehab Therapy  [] Yes  []No   Acute Rehab  [] Yes  []No   Subacute Rehab  [] Yes  []No   Private Care  [] Yes  []No   Substance Abuse Referral  [] Yes  []No   Transportation  [] Yes  []No   Chore Service  [] Yes  []No   Inpatient Hospice  [] Yes  []No   OP RT  [] Yes  [] No   OP Hemo  [] Yes  [] No   OP PT  [] Yes  []No   Support Group  [] Yes  []No   Reach to Recovery  [] Yes  []No   OP Oncology Clinic  [] Yes  []No   Clinic Appointment  [] Yes  []No   DME  [] Yes  []No   Comments    Name of D/C Planner or  Given to Patient or Family Essence Miguel. Marisela Sanches RN   Phone Number         Extension [phone]   Date 03/20/18   Time    If you are discharged home, whom do you designate to participate in your discharge plan and receive any information needed?      Enter name of designee Nifransisco        Phone # of designee         Address of designee         Updated         Patient refused to designate any           individual

## 2018-03-20 NOTE — PROGRESS NOTES
Problem: Self Care Deficits Care Plan (Adult)  Goal: *Acute Goals and Plan of Care (Insert Text)  Outcome: Resolved/Met Date Met: 03/20/18  Occupational Therapy EVALUATION/discharge    Patient: Kim Ospina (89 y.o. female)  Date: 3/20/2018  Primary Diagnosis: TIA (transient ischemic attack)        Precautions:   (none)  PLOF: Pt was independent with basic self care tasks and functional mobility PTA. ASSESSMENT AND RECOMMENDATIONS:  Based on the objective data described below, the patient presents at baseline with regard to functional transfers & mobility, ADLs & activity tolerance. Pt up in chair on arrival, no c/o pain, dizziness or numbness. Full AROM/strength of BUEs; coordination & sensation of LUE intact. Supervision for functional transfers; maneuvered to bathroom with supervision & min vc's for pacing to ensure safety. Pt educated on role of OT & home safety; she verbalized understanding. Skilled therapy not indicated; pt presents at baseline with regard to ADLs & functional mobility. Left up in chair, needs within reach. Skilled occupational therapy is not indicated at this time. Discharge Recommendations: Home Health vs. None  Further Equipment Recommendations for Discharge: shower chair     SUBJECTIVE:   Patient stated Sometimes I take a cab to go grocery shopping.     OBJECTIVE DATA SUMMARY:     Past Medical History:   Diagnosis Date    Arthritis     Bilateral cataracts     sees Dr. Antonio Asp COPD     Coronary artery disease     Cx - 2.75 x 33mm XIENCE 10/13     Diabetes     Dyslipidemia     Hypertension     MI (myocardial infarction)     Noncompliance     Obesity     TIA (transient ischemic attack) 3/20/2018     Past Surgical History:   Procedure Laterality Date    HX CORONARY STENT PLACEMENT  2013     Barriers to Learning/Limitations: None  Compensate with: visual, verbal, tactile, kinesthetic cues/model    G CODES:  Self Care  Current  CI= 1-19%   Goal  CI= 1-19%   D/C  CI= 1-19%. The severity rating is based on the Other Functional Asssesment, MMT, ROM    Eval Complexity: History: MEDIUM Complexity : Expanded review of history including physical, cognitive and psychosocial  history ; Examination: MEDIUM Complexity : 3-5 performance deficits relating to physical, cognitive , or psychosocial skils that result in activity limitations and / or participation restrictions; Decision Making:LOW Complexity : No comorbidities that affect functional and no verbal or physical assistance needed to complete eval tasks      Prior Level of Function/Home Situation: Pt was independent with basic self care tasks and functional mobility PTA. Home Situation  Home Environment: Apartment  # Steps to Enter: 4  Rails to Enter: Yes  One/Two Story Residence: Two story, live on 1st floor  # of Interior Steps:  (0)  Height of Each Step (in): 0 inches  Interior Rails: None  Lift Chair Available: No  Living Alone: Yes  Support Systems: Friends \ neighbors  Patient Expects to be Discharged to[de-identified] Private residence  Current DME Used/Available at Home: None  Tub or Shower Type: Tub/Shower combination (no grab bars or shower chair)  [x]     Right hand dominant   []     Left hand dominant    Cognitive/Behavioral Status:  Neurologic State: Alert  Orientation Level: Oriented X4  Cognition: Appropriate decision making; Appropriate for age attention/concentration; Appropriate safety awareness; Follows commands  Safety/Judgement: Awareness of environment; Fall prevention     Skin: Intact (BUEs)  Edema: None noted (BUEs)    Vision/Perceptual:    Acuity: Able to read clock/calendar on wall without difficulty      Coordination:  Coordination: Within functional limits (BUEs)  Fine Motor Skills-Upper: Right Intact; Left Intact    Gross Motor Skills-Upper: Right Intact; Left Intact     Balance:  Sitting: Intact  Standing: Impaired  Standing - Static: Good  Standing - Dynamic : Fair     Strength:  Strength:  Within functional limits (BUEs)    Tone & Sensation:  Tone: Normal (BUEs)  Sensation: Intact (BUEs)    Range of Motion:  AROM: Within functional limits (BUEs)    Functional Mobility and Transfers for ADLs:  Bed Mobility:  Supine to Sit:  (up in chair)  Sit to Supine:  (up in chair)    Transfers:  Sit to Stand: Supervision   Toilet Transfer : Supervision    ADL Assessment:  Feeding: Independent  Oral Facial Hygiene/Grooming: Modified Independent  Bathing: Modified independent  Upper Body Dressing: Independent  Lower Body Dressing: Modified independent; Additional time  Toileting: Modified independent    Cognitive Retraining  Safety/Judgement: Awareness of environment; Fall prevention    Pain:  Pre-treatment pain level: 0/10  Post treatment pain level: 0/10  Pain Scale 1: Numeric (0 - 10)  Pain Intensity 1: 0    Activity Tolerance:  Fair+  Please refer to the flowsheet for vital signs taken during this treatment. After treatment:   [x]  Patient left in no apparent distress sitting up in chair  []  Patient left in no apparent distress in bed  [x]  Call bell left within reach  [x]  Nursing notified  []  Caregiver present  []  Bed alarm activated    COMMUNICATION/EDUCATION: Pt educated on role of OT, POC and home safety. She verbalized understanding. Communication/Collaboration:  [x]      Home safety education was provided and the patient/caregiver indicated understanding. [x]      Patient/family have participated as able and agree with findings and recommendations. []      Patient is unable to participate in plan of care at this time.     Kia Caldera MS OTR/L  Time Calculation: 8 mins

## 2018-03-20 NOTE — PROGRESS NOTES
Patient arrived on unit via wheelchair. AAOX4, calm and cooperative. Dual NIH completed. Dual skin assessment completed. Patient denies pain at this time. Patient oriented to room. Bed locked and at lowest position. Chair locked. Patient resting comfortably in chair. Patient passed dysphagia and also asked for a nicotine patch. Patient stated she smokes one pack per day. Will notify the doctor. 12- Notified Dr. Hong Chang patient passed dysphagia screening and patient requested a nicotine patch. Doctor ordered. Diabetic diet with normal sliding scale and Nicoderm patch 14mg transdermal daily. trb    5414-  Tele monitor notified this RN patient was in [place] MATE[place] Jj with 1st degree HB and BBB. Will notify doctor. 3741- Notified Doctor in regards to [place] MATE[employer] Jj with 1st degree HB with BBB. No new orders at this time. Bedside and Verbal shift change report given to SIL Meadows (oncoming nurse) by Elvia Medina RN (offgoing nurse). Report included the following information SBAR, Kardex, MAR and Cardiac Rhythm NSR with 1st degree HB and BBB.

## 2018-03-20 NOTE — PROGRESS NOTES
Problem: Mobility Impaired (Adult and Pediatric)  Goal: *Acute Goals and Plan of Care (Insert Text)  Physical Therapy Goals  Initiated 3/20/2018 and to be accomplished within 7 day(s)  1. Patient will move from supine to sit and sit to supine  in bed with modified independence. 2.  Patient will transfer from bed to chair and chair to bed with modified independence using the least restrictive device. 3.  Patient will perform sit to stand with modified independence. 4.  Patient will ambulate with modified independence for 250 feet with the least restrictive device. 5.  Patient will ascend/descend 4 stairs with handrail(s) with modified independence. Outcome: Progressing Towards Goal  physical Therapy EVALUATION    Patient: Jacques Willson (89 y.o. female)  Date: 3/20/2018  Primary Diagnosis: TIA (transient ischemic attack)  Precautions:  (none)    ASSESSMENT :  Patient requires supervision/set-up for bed mobility, transfers and ambulation. Seated in chair upon arrival. BLE strength 5/5. Follows all commands. Supervision for sit to stand. Fair standing balance. Amb 100ft with supervision with IV pole as AD first 50ft; then no AD second 50ft. Cues for obstacle negotiation with IV pole. Decreased toe clearance with amb. Returned to supine in chair with supervision. Poor sitting posture with rounded shoulders and posterior pelvic tilt. Encouraged improved posture with placement of pillow; patient refuses. Call bell in reach; educated on need for assistance with mobility. Educated on role of PT and plan of care. Verbalized understanding of all teaching. Vitals post mobility /71, HR 67, O2 saturation 94%. Would benefit from physical therapy to progress functional tolerance, balance, and safety awareness as patient lives alone and was independent prior to admission.    Patient presents with deficits in:  Bed Mobility, Transfers, Gait, Balance and Stairs    Patient will benefit from skilled intervention to address the above impairments. Patients rehabilitation potential is considered to be Good  Factors which may influence rehabilitation potential include:   []         None noted  []         Mental ability/status  [x]         Medical condition  [x]         Home/family situation and support systems  []         Safety awareness  []         Pain tolerance/management  []         Other:      PLAN :  Recommendations and Planned Interventions:  [x]           Bed Mobility Training             [x]    Neuromuscular Re-Education  [x]           Transfer Training                   []    Orthotic/Prosthetic Training  [x]           Gait Training                          []    Modalities  [x]           Therapeutic Exercises          []    Edema Management/Control  [x]           Therapeutic Activities            [x]    Patient and Family Training/Education  []           Other (comment):    Frequency/Duration: Patient will be followed by physical therapy 1-2 times per day/4-7 days per week to address goals. Discharge Recommendations: Home Health  Further Equipment Recommendations for Discharge: N/A     SUBJECTIVE:   Patient stated I can't sleep in that bed.     OBJECTIVE DATA SUMMARY:     Past Medical History:   Diagnosis Date    Arthritis     Bilateral cataracts     sees Dr. Cristina Joseph COPD     Coronary artery disease     Cx - 2.75 x 33mm XIENCE 10/13     Diabetes     Dyslipidemia     Hypertension     MI (myocardial infarction)     Noncompliance     Obesity     TIA (transient ischemic attack) 3/20/2018     Past Surgical History:   Procedure Laterality Date    HX CORONARY STENT PLACEMENT  2013     Barriers to Learning/Limitations: None  Compensate with: visual, verbal, tactile, kinesthetic cues/model    G CODES:Mobility  Current  CK= 40-59%   Goal  CI= 1-19%.   The severity rating is based on the Other [facility] Balance Scale 3/5      Eval Complexity: History: MEDIUM  Complexity : 1-2 comorbidities / personal factors will impact the outcome/ POC Exam:MEDIUM Complexity : 3 Standardized tests and measures addressing body structure, function, activity limitation and / or participation in recreation  Presentation: MEDIUM Complexity : Evolving with changing characteristics  Clinical Decision Making:Medium Complexity [facility] Standing Balance Scale 3/5 Overall Complexity:MEDIUM    [employer] Balance Scale 3/5  0: Pt performs 25% or less of standing activity (Max assist) CN, 100% impaired. 1: Pt supports self with upper extremities but requires therapist assistance. Pt performs 25-50% of effort (Mod assist) CM, 80% to <100% impaired. 1+: Pt supports self with upper extremities but requires therapist assistance. Pt performs >50% effort. (Min assist). CL, 60% to <80% impaired. 2: Pt supports self independently with both upper extremities (walker, crutches, parallel bars). CL, 60% to <80% impaired. 2+: Pt support self independently with 1 upper extremity (cane, crutch, 1 parallel bar). CK, 40% to <60% impaired. 3: Pt stands without upper extremity support for up to 30 seconds. CK, 40% to <60% impaired. 3+: Pt stands without upper extremity support for 30 seconds or greater. CJ, 20% to <40% impaired. 4: Pt independently moves and returns center of gravity 1-2 inches in one plane. CJ, 20% to <40% impaired. 4+: Pt independently moves and returns center of gravity 1-2 inches in multiple planes. CI, 1% to <20% impaired. 5: Pt independently moves and returns center of gravity in all planes greater than 2 inches. CH, 0% impaired.     Prior Level of Function/Home Situation: Independent  Home Situation  Home Environment: Apartment  # Steps to Enter: 4  Rails to Enter: Yes  One/Two Story Residence: Two story, live on 1st floor  # of Interior Steps:  (0)  Height of Each Step (in): 0 inches  Interior Rails: None  Lift Chair Available: No  Living Alone: Yes  Support Systems: Friends \ neighbors  Patient Expects to be Discharged to[de-identified] Private residence  Current DME Used/Available at Home: None  Critical Behavior:  Neurologic State: Alert  Orientation Level: Oriented X4  Cognition: Appropriate decision making; Follows commands  Safety/Judgement: Awareness of environment; Fall prevention  Psychosocial  Patient Behaviors: Cooperative  Strength:    Strength: Within functional limits  Manual Muscle Testing (LE)         R     L    Hip Flexion:   5/5 5/5    Knee EXT:   5/5 5/5  Knee FLEX:   5/5 5/5      Ankle DF:   5/5 5/5  _________________________________________________   Tone & Sensation:   Tone: Normal  Sensation: Intact  Range Of Motion:  AROM: Within functional limits  Functional Mobility:  Bed Mobility:  Supine to Sit:  (seated in chair)  Sit to Supine:  (seated in chair)  Transfers:  Sit to Stand: Supervision  Stand to Sit: Supervision  Balance:   Sitting: Intact  Standing: Impaired  Standing - Static: Good  Standing - Dynamic : Fair  Ambulation/Gait Training:  Distance (ft): 100 Feet (ft)  Assistive Device:  (none/IV pole)  Ambulation - Level of Assistance: Supervision  Gait Description (WDL): Exceptions to WDL  Therapeutic Exercises:   Sit to stand  Pain:  Pre treatment pain level: 2  Post treatment pain level: 2  Activity Tolerance:   Fair  Please refer to the flowsheet for vital signs taken during this treatment. After treatment:   [x]         Patient left in no apparent distress sitting up in chair  []         Patient left in no apparent distress in bed  [x]         Call bell left within reach  [x]         Nursing notified  []         Caregiver present  []         Bed alarm activated    COMMUNICATION/EDUCATION:   [x]         Fall prevention education was provided and the patient/caregiver indicated understanding. [x]         Patient/family have participated as able in goal setting and plan of care. [x]         Patient/family agree to work toward stated goals and plan of care.   [] Patient understands intent and goals of therapy, but is neutral about his/her participation. []         Patient is unable to participate in goal setting and plan of care. Patient educated on the role of physical therapy during the acute stay  and the importance of mobility. NEELAM.       Thank you for this referral.  Reyna Oglesby, PT   Time Calculation: 13 mins

## 2018-03-20 NOTE — PROGRESS NOTES
Problem: Falls - Risk of  Goal: *Absence of Falls  Document Elena Fall Risk and appropriate interventions in the flowsheet.    Outcome: Progressing Towards Goal  Fall Risk Interventions:  Mobility Interventions: Patient to call before getting OOB, PT Consult for mobility concerns, PT Consult for assist device competence, Utilize walker, cane, or other assitive device     Medication Interventions: Patient to call before getting OOB, Teach patient to arise slowly    Elimination Interventions: Call light in reach, Patient to call for help with toileting needs, Toileting schedule/hourly rounds    History of Falls Interventions: Consult care management for discharge planning    Problem: Diabetes Self-Management  Goal: *Developing strategies to address psychosocial issues  Describe feelings about living with diabetes; identify support needed and support network   Outcome: Not Progressing Towards Goal  Pt reports not taking insulin for several months due to lack of insurance

## 2018-03-21 ENCOUNTER — HOME HEALTH ADMISSION (OUTPATIENT)
Dept: HOME HEALTH SERVICES | Facility: HOME HEALTH | Age: 73
End: 2018-03-21

## 2018-03-21 VITALS
HEART RATE: 60 BPM | HEIGHT: 63 IN | BODY MASS INDEX: 40.4 KG/M2 | DIASTOLIC BLOOD PRESSURE: 80 MMHG | SYSTOLIC BLOOD PRESSURE: 161 MMHG | TEMPERATURE: 97.4 F | OXYGEN SATURATION: 97 % | RESPIRATION RATE: 16 BRPM | WEIGHT: 228 LBS

## 2018-03-21 LAB
ATRIAL RATE: 60 BPM
CALCULATED P AXIS, ECG09: 87 DEGREES
CALCULATED R AXIS, ECG10: -56 DEGREES
CALCULATED T AXIS, ECG11: 31 DEGREES
CRP SERPL HS-MCNC: 4.06 MG/L (ref 0–3)
DIAGNOSIS, 93000: NORMAL
GLUCOSE BLD STRIP.AUTO-MCNC: 108 MG/DL (ref 70–110)
GLUCOSE BLD STRIP.AUTO-MCNC: 220 MG/DL (ref 70–110)
P-R INTERVAL, ECG05: 354 MS
Q-T INTERVAL, ECG07: 438 MS
QRS DURATION, ECG06: 128 MS
QTC CALCULATION (BEZET), ECG08: 438 MS
VENTRICULAR RATE, ECG03: 60 BPM

## 2018-03-21 PROCEDURE — 74011250636 HC RX REV CODE- 250/636: Performed by: HOSPITALIST

## 2018-03-21 PROCEDURE — 74011636637 HC RX REV CODE- 636/637: Performed by: HOSPITALIST

## 2018-03-21 PROCEDURE — 74011250637 HC RX REV CODE- 250/637: Performed by: HOSPITALIST

## 2018-03-21 PROCEDURE — 82962 GLUCOSE BLOOD TEST: CPT

## 2018-03-21 PROCEDURE — 92526 ORAL FUNCTION THERAPY: CPT

## 2018-03-21 PROCEDURE — 77030020263 HC SOL INJ SOD CL0.9% LFCR 1000ML

## 2018-03-21 PROCEDURE — 97116 GAIT TRAINING THERAPY: CPT

## 2018-03-21 PROCEDURE — 93005 ELECTROCARDIOGRAM TRACING: CPT

## 2018-03-21 RX ORDER — ASPIRIN 325 MG
325 TABLET ORAL DAILY
Qty: 30 TAB | Refills: 0 | Status: SHIPPED | OUTPATIENT
Start: 2018-03-22

## 2018-03-21 RX ORDER — INSULIN ASPART 100 [IU]/ML
3 INJECTION, SOLUTION INTRAVENOUS; SUBCUTANEOUS
Qty: 1 PEN | Refills: 0 | Status: SHIPPED | OUTPATIENT
Start: 2018-03-21 | End: 2018-04-03 | Stop reason: SDUPTHER

## 2018-03-21 RX ORDER — ASPIRIN 325 MG
325 TABLET ORAL DAILY
Qty: 30 TAB | Refills: 0 | Status: SHIPPED | OUTPATIENT
Start: 2018-03-22 | End: 2018-03-21

## 2018-03-21 RX ORDER — INSULIN GLARGINE 100 [IU]/ML
12 INJECTION, SOLUTION SUBCUTANEOUS DAILY
Qty: 3 PEN | Refills: 0 | Status: SHIPPED | OUTPATIENT
Start: 2018-03-21 | End: 2018-04-03 | Stop reason: SDUPTHER

## 2018-03-21 RX ORDER — ATORVASTATIN CALCIUM 80 MG/1
80 TABLET, FILM COATED ORAL
Qty: 30 TAB | Refills: 0 | Status: SHIPPED | OUTPATIENT
Start: 2018-03-21 | End: 2018-04-03 | Stop reason: SDUPTHER

## 2018-03-21 RX ORDER — ATORVASTATIN CALCIUM 80 MG/1
80 TABLET, FILM COATED ORAL
Qty: 30 TAB | Refills: 0 | Status: SHIPPED | OUTPATIENT
Start: 2018-03-21 | End: 2018-03-21

## 2018-03-21 RX ADMIN — ASPIRIN 325 MG ORAL TABLET 325 MG: 325 PILL ORAL at 10:05

## 2018-03-21 RX ADMIN — HEPARIN SODIUM 5000 UNITS: 5000 INJECTION, SOLUTION INTRAVENOUS; SUBCUTANEOUS at 05:41

## 2018-03-21 RX ADMIN — INSULIN LISPRO 3 UNITS: 100 INJECTION, SOLUTION INTRAVENOUS; SUBCUTANEOUS at 10:04

## 2018-03-21 RX ADMIN — INSULIN LISPRO 3 UNITS: 100 INJECTION, SOLUTION INTRAVENOUS; SUBCUTANEOUS at 13:00

## 2018-03-21 RX ADMIN — INSULIN LISPRO 6 UNITS: 100 INJECTION, SOLUTION INTRAVENOUS; SUBCUTANEOUS at 13:00

## 2018-03-21 RX ADMIN — FOLIC ACID 1 MG: 1 TABLET ORAL at 10:06

## 2018-03-21 RX ADMIN — Medication 100 MG: at 10:06

## 2018-03-21 RX ADMIN — INSULIN GLARGINE 12 UNITS: 100 INJECTION, SOLUTION SUBCUTANEOUS at 10:02

## 2018-03-21 RX ADMIN — PANTOPRAZOLE SODIUM 40 MG: 40 TABLET, DELAYED RELEASE ORAL at 10:06

## 2018-03-21 NOTE — DISCHARGE INSTRUCTIONS
DISCHARGE SUMMARY from Nurse    PATIENT INSTRUCTIONS:    After general anesthesia or intravenous sedation, for 24 hours or while taking prescription Narcotics:  · Limit your activities  · Do not drive and operate hazardous machinery  · Do not make important personal or business decisions  · Do  not drink alcoholic beverages  · If you have not urinated within 8 hours after discharge, please contact your surgeon on call. Report the following to your surgeon:  · Excessive pain, swelling, redness or odor of or around the surgical area  · Temperature over 100.5  · Nausea and vomiting lasting longer than 4 hours or if unable to take medications  · Any signs of decreased circulation or nerve impairment to extremity: change in color, persistent  numbness, tingling, coldness or increase pain  · Any questions    What to do at Home:  Recommended activity: Activity as tolerated, or as physician advised    If you experience any of the following symptoms of a Stroke, Warning Signs of a Heart Attack and When to Call for Help as listed in discharge teachings, please follow up with your primary care physician or call 911. *  Please give a list of your current medications to your Primary Care Provider. *  Please update this list whenever your medications are discontinued, doses are      changed, or new medications (including over-the-counter products) are added. *  Please carry medication information at all times in case of emergency situations. These are general instructions for a healthy lifestyle:    No smoking/ No tobacco products/ Avoid exposure to second hand smoke  Surgeon General's Warning:  Quitting smoking now greatly reduces serious risk to your health.     Obesity, smoking, and sedentary lifestyle greatly increases your risk for illness    A healthy diet, regular physical exercise & weight monitoring are important for maintaining a healthy lifestyle    You may be retaining fluid if you have a history of heart failure or if you experience any of the following symptoms:  Weight gain of 3 pounds or more overnight or 5 pounds in a week, increased swelling in our hands or feet or shortness of breath while lying flat in bed. Please call your doctor as soon as you notice any of these symptoms; do not wait until your next office visit. Recognize signs and symptoms of STROKE:    F-face looks uneven    A-arms unable to move or move unevenly    S-speech slurred or non-existent    T-time-call 911 as soon as signs and symptoms begin-DO NOT go       Back to bed or wait to see if you get better-TIME IS BRAIN. Warning Signs of HEART ATTACK     Call 911 if you have these symptoms:   Chest discomfort. Most heart attacks involve discomfort in the center of the chest that lasts more than a few minutes, or that goes away and comes back. It can feel like uncomfortable pressure, squeezing, fullness, or pain.  Discomfort in other areas of the upper body. Symptoms can include pain or discomfort in one or both arms, the back, neck, jaw, or stomach.  Shortness of breath with or without chest discomfort.  Other signs may include breaking out in a cold sweat, nausea, or lightheadedness. Don't wait more than five minutes to call 911 - MINUTES MATTER! Fast action can save your life. Calling 911 is almost always the fastest way to get lifesaving treatment. Emergency Medical Services staff can begin treatment when they arrive -- up to an hour sooner than if someone gets to the hospital by car. The discharge information has been reviewed with the patient. The patient verbalized understanding. Discharge medications reviewed with the patient and appropriate educational materials and side effects teaching were provided.   ___________________________________________________________________________________________________________________________________     Taking Aspirin to Prevent Heart Attack and Stroke: Care Instructions  Your Care Instructions    Aspirin acts as a \"blood thinner. \" It prevents blood clots from forming. When taken during and after a heart attack, it can reduce your chance of dying. And it's used if you have a stent in your coronary artery. Also, aspirin helps certain people lower their risk of a heart attack or stroke. Be sure you know what dose of aspirin to take and how often to take it. Low-dose aspirin is typically 81 mg. But the dose for daily aspirin can range from 81 mg to 325 mg. Taking aspirin every day can cause bleeding. It may not be safe if you have stomach ulcers. And it may not be safe if you have high blood pressure that is not controlled. If you take aspirin pills every day, do not take ones that have other ingredients such as caffeine or sodium. Before you start to take aspirin, tell your doctor all the medicines, vitamins, herbal products, and supplements you take. Follow-up care is a key part of your treatment and safety. Be sure to make and go to all appointments, and call your doctor if you are having problems. It's also a good idea to know your test results and keep a list of the medicines you take. How can you care for yourself at home? · Take aspirin with a full glass of water unless your doctor tells you not to. Do not lie down right after you take it. · If you have a stent in your coronary artery, take your aspirin as your heart doctor says to. If another doctor says to stop taking the aspirin for any reason, talk to your heart doctor before you stop. · Do not chew or crush the coated or sustained-release forms of aspirin. · Ask your doctor if you can drink alcohol while you take aspirin. And ask how much you can drink. Too much alcohol with aspirin can cause stomach bleeding. · Do not take aspirin if you are pregnant, unless your doctor says it is okay. · Keep all aspirin out of children's reach. · Throw aspirin away if it starts to smell like vinegar.   · Do not take aspirin if you have gout or if you take prescription blood thinners, unless your doctor has told you to. · Do not take prescription or over-the-counter medicines, vitamins, herbal products, or supplements without talking to your doctor first. [employer] the label before you take another over-the-counter medicine. Many contain aspirin. So they could cause you to take too much aspirin. · Talk with your doctor before you take a pain medicine. Ask which type of medicine you can take and how to take it safely with aspirin. · Tell your doctor or dentist before a surgery or procedure that you take aspirin. He or she will tell you if you should stop taking aspirin before your surgery or procedure. Make sure that you understand exactly what your doctor wants you to do. Where can you learn more? Go to http://maureen-satish.info/. Enter M096 in the search box to learn more about \"Taking Aspirin to Prevent Heart Attack and Stroke: Care Instructions. \"  Current as of: September 21, 2016  Content Version: 11.4  © 2285-8380 [employer]. Care instructions adapted under license by [employer] (which disclaims liability or warranty for this information). If you have questions about a medical condition or this instruction, always ask your healthcare professional. [address] any warranty or liability for your use of this information. Noninsulin Medicines for Type 2 Diabetes: Care Instructions  Your Care Instructions    There are different types of noninsulin medicines for diabetes. Each works in a different way. But they all help you control your blood sugar. Some types help your body make insulin to lower your blood sugar. Others lower how much insulin your body needs. Some can slow how fast your body digests sugars. And some can remove extra glucose through your urine. · Alpha-glucosidase inhibitors. These keep starches from breaking down.  This means that they lower the amount of glucose absorbed when you eat. They don't help your body make more insulin. So they will not cause low blood sugar unless you use them with other medicines for diabetes. They include acarbose and miglitol. · DPP-4 inhibitors. These help your body raise the level of insulin after you eat. They also help your body make less of a hormone that raises blood sugar. They include linagliptin, saxagliptin, and sitagliptin. · Incretin hormones (GLP-1 receptor agonists). Your body makes a protein that can raise your insulin level. It also can lower your blood sugar and make you less hungry. You can get shots of hormones that work the same way. They include exenatide and liraglutide. · Meglitinides. These help your body release insulin. They also help slow how your body digests sugars. So they can keep your blood sugar from rising too fast after you eat. They include nateglinide and repaglinide. · Metformin. This lowers how much glucose your liver makes. And it helps you respond better to insulin. It also lowers the amount of stored sugar that your liver releases when you are not eating. · SGLT2 inhibitors. These help to remove extra glucose through your urine. They may also help some people lose weight. They include canagliflozin, dapagliflozin, and empagliflozin. · Sulfonylureas. These help your body release more insulin. Some work for many hours. They can cause low blood sugar if you don't eat as you planned. They include glipizide and glyburide. · Thiazolidinediones. These reduce the amount of blood glucose. They also help you respond better to insulin. They include pioglitazone and rosiglitazone. You may need to take more than one medicine for diabetes. Two or more medicines may work better to lower your blood sugar level than just one does. Follow-up care is a key part of your treatment and safety. Be sure to make and go to all appointments, and call your doctor if you are having problems.  It's also a good idea to know your test results and keep a list of the medicines you take. How can you care for yourself at home? · Eat a healthy diet. Get some exercise each day. This may help you to reduce how much medicine you need. · Do not take other prescription or over-the-counter medicines, vitamins, herbal products, or supplements without talking to your doctor first. Some medicines for type 2 diabetes can cause problems with other medicines or supplements. · Tell your doctor if you plan to get pregnant. Some of these drugs are not safe for pregnant women. · Be safe with medicines. Take your medicines exactly as prescribed. Meglitinides and sulfonylureas can cause your blood sugar to drop very low. Call your doctor if you think you are having a problem with your medicine. · Check your blood sugar often. You can use a glucose monitor. Keeping track can help you know how certain foods, activities, and medicines affect your blood sugar. And it can help you keep your blood sugar from getting so low that it's not safe. When should you call for help? Call 911 anytime you think you may need emergency care. For example, call if:  ? · You passed out (lost consciousness). ? · You are confused or cannot think clearly. ? · Your blood sugar is very high or very low. ? Watch closely for changes in your health, and be sure to contact your doctor if:  ? · Your blood sugar stays outside the level your doctor set for you. ? · You have any problems. Where can you learn more? Go to http://maureen-satsih.info/. Enter H153 in the search box to learn more about \"Noninsulin Medicines for Type 2 Diabetes: Care Instructions. \"  Current as of: March 13, 2017  Content Version: 11.4  © 1210-6615 [employer]. Care instructions adapted under license by [employer] (which disclaims liability or warranty for this information).  If you have questions about a medical condition or this instruction, always ask your healthcare professional. [place] [address] any warranty or liability for your use of this information. High Blood Pressure: Care Instructions  Your Care Instructions    If your blood pressure is usually above 140/90, you have high blood pressure, or hypertension. That means the top number is 140 or higher or the bottom number is 90 or higher, or both. Despite what a lot of people think, high blood pressure usually doesn't cause headaches or make you feel dizzy or lightheaded. It usually has no symptoms. But it does increase your risk for heart attack, stroke, and kidney or eye damage. The higher your blood pressure, the more your risk increases. Your doctor will give you a goal for your blood pressure. Your goal will be based on your health and your age. An example of a goal is to keep your blood pressure below 140/90. Lifestyle changes, such as eating healthy and being active, are always important to help lower blood pressure. You might also take medicine to reach your blood pressure goal.  Follow-up care is a key part of your treatment and safety. Be sure to make and go to all appointments, and call your doctor if you are having problems. It's also a good idea to know your test results and keep a list of the medicines you take. How can you care for yourself at home? Medical treatment  · If you stop taking your medicine, your blood pressure will go back up. You may take one or more types of medicine to lower your blood pressure. Be safe with medicines. Take your medicine exactly as prescribed. Call your doctor if you think you are having a problem with your medicine. · Talk to your doctor before you start taking aspirin every day. Aspirin can help certain people lower their risk of a heart attack or stroke. But taking aspirin isn't right for everyone, because it can cause serious bleeding. · See your doctor regularly.  You may need to see the doctor more often at first or until your blood pressure comes down. · If you are taking blood pressure medicine, talk to your doctor before you take decongestants or anti-inflammatory medicine, such as ibuprofen. Some of these medicines can raise blood pressure. · Learn how to check your blood pressure at home. Lifestyle changes  · Stay at a healthy weight. This is especially important if you put on weight around the waist. Losing even 10 pounds can help you lower your blood pressure. · If your doctor recommends it, get more exercise. Walking is a good choice. Bit by bit, increase the amount you walk every day. Try for at least 30 minutes on most days of the week. You also may want to swim, bike, or do other activities. · Avoid or limit alcohol. Talk to your doctor about whether you can drink any alcohol. · Try to limit how much sodium you eat to less than 2,300 milligrams (mg) a day. Your doctor may ask you to try to eat less than 1,500 mg a day. · Eat plenty of fruits (such as bananas and oranges), vegetables, legumes, whole grains, and low-fat dairy products. · Lower the amount of saturated fat in your diet. Saturated fat is found in animal products such as milk, cheese, and meat. Limiting these foods may help you lose weight and also lower your risk for heart disease. · Do not smoke. Smoking increases your risk for heart attack and stroke. If you need help quitting, talk to your doctor about stop-smoking programs and medicines. These can increase your chances of quitting for good. When should you call for help? Call 911 anytime you think you may need emergency care. This may mean having symptoms that suggest that your blood pressure is causing a serious heart or blood vessel problem. Your blood pressure may be over 180/110. ? For example, call 911 if:  ? · You have symptoms of a heart attack. These may include:  ¨ Chest pain or pressure, or a strange feeling in the chest.  ¨ Sweating. ¨ Shortness of breath.   ¨ Nausea or vomiting. ¨ Pain, pressure, or a strange feeling in the back, neck, jaw, or upper belly or in one or both shoulders or arms. ¨ Lightheadedness or sudden weakness. ¨ A fast or irregular heartbeat. ? · You have symptoms of a stroke. These may include:  ¨ Sudden numbness, tingling, weakness, or loss of movement in your face, arm, or leg, especially on only one side of your body. ¨ Sudden vision changes. ¨ Sudden trouble speaking. ¨ Sudden confusion or trouble understanding simple statements. ¨ Sudden problems with walking or balance. ¨ A sudden, severe headache that is different from past headaches. ? · You have severe back or belly pain. ?Do not wait until your blood pressure comes down on its own. Get help right away. ?Call your doctor now or seek immediate care if:  ? · Your blood pressure is much higher than normal (such as 180/110 or higher), but you don't have symptoms. ? · You think high blood pressure is causing symptoms, such as:  ¨ Severe headache. ¨ Blurry vision. ? Watch closely for changes in your health, and be sure to contact your doctor if:  ? · Your blood pressure measures 140/90 or higher at least 2 times. That means the top number is 140 or higher or the bottom number is 90 or higher, or both. ? · You think you may be having side effects from your blood pressure medicine. ? · Your blood pressure is usually normal, but it goes above normal at least 2 times. Where can you learn more? Go to http://maureen-satish.info/. Enter E664 in the search box to learn more about \"High Blood Pressure: Care Instructions. \"  Current as of: September 21, 2016  Content Version: 11.4  © 9113-2994 [employer]. Care instructions adapted under license by [employer] (which disclaims liability or warranty for this information).  If you have questions about a medical condition or this instruction, always ask your healthcare professional. [employer] disclaims any warranty or liability for your use of this information. If your blood pressure is usually above 140/90, you have high blood pressure, or hypertension. That means the top number is 140 or higher or the bottom number is 90 or higher, or both. Despite what a lot of people think, high blood pressure usually doesn't cause headaches or make you feel dizzy or lightheaded. It usually has no symptoms. But it does increase your risk for heart attack, stroke, and kidney or eye damage. The higher your blood pressure, the more your risk increases. Your doctor will give you a goal for your blood pressure. Your goal will be based on your health and your age. An example of a goal is to keep your blood pressure below 140/90. Lifestyle changes, such as eating healthy and being active, are always important to help lower blood pressure. You might also take medicine to reach your blood pressure goal.  Follow-up care is a key part of your treatment and safety. Be sure to make and go to all appointments, and call your doctor if you are having problems. It's also a good idea to know your test results and keep a list of the medicines you take. How can you care for yourself at home? Medical treatment  · If you stop taking your medicine, your blood pressure will go back up. You may take one or more types of medicine to lower your blood pressure. Be safe with medicines. Take your medicine exactly as prescribed. Call your doctor if you think you are having a problem with your medicine. · Talk to your doctor before you start taking aspirin every day. Aspirin can help certain people lower their risk of a heart attack or stroke. But taking aspirin isn't right for everyone, because it can cause serious bleeding. · See your doctor regularly. You may need to see the doctor more often at first or until your blood pressure comes down.   · If you are taking blood pressure medicine, talk to your doctor before you take decongestants or anti-inflammatory medicine, such as ibuprofen. Some of these medicines can raise blood pressure. · Learn how to check your blood pressure at home. Lifestyle changes  · Stay at a healthy weight. This is especially important if you put on weight around the waist. Losing even 10 pounds can help you lower your blood pressure. · If your doctor recommends it, get more exercise. Walking is a good choice. Bit by bit, increase the amount you walk every day. Try for at least 30 minutes on most days of the week. You also may want to swim, bike, or do other activities. · Avoid or limit alcohol. Talk to your doctor about whether you can drink any alcohol. · Try to limit how much sodium you eat to less than 2,300 milligrams (mg) a day. Your doctor may ask you to try to eat less than 1,500 mg a day. · Eat plenty of fruits (such as bananas and oranges), vegetables, legumes, whole grains, and low-fat dairy products. · Lower the amount of saturated fat in your diet. Saturated fat is found in animal products such as milk, cheese, and meat. Limiting these foods may help you lose weight and also lower your risk for heart disease. · Do not smoke. Smoking increases your risk for heart attack and stroke. If you need help quitting, talk to your doctor about stop-smoking programs and medicines. These can increase your chances of quitting for good. When should you call for help? Call 911 anytime you think you may need emergency care. This may mean having symptoms that suggest that your blood pressure is causing a serious heart or blood vessel problem. Your blood pressure may be over 180/110. ? For example, call 911 if:  ? · You have symptoms of a heart attack. These may include:  ¨ Chest pain or pressure, or a strange feeling in the chest.  ¨ Sweating. ¨ Shortness of breath. ¨ Nausea or vomiting.   ¨ Pain, pressure, or a strange feeling in the back, neck, jaw, or upper belly or in one or both shoulders or arms.  ¨ Lightheadedness or sudden weakness. ¨ A fast or irregular heartbeat. ? · You have symptoms of a stroke. These may include:  ¨ Sudden numbness, tingling, weakness, or loss of movement in your face, arm, or leg, especially on only one side of your body. ¨ Sudden vision changes. ¨ Sudden trouble speaking. ¨ Sudden confusion or trouble understanding simple statements. ¨ Sudden problems with walking or balance. ¨ A sudden, severe headache that is different from past headaches. ? · You have severe back or belly pain. ?Do not wait until your blood pressure comes down on its own. Get help right away. ?Call your doctor now or seek immediate care if:  ? · Your blood pressure is much higher than normal (such as 180/110 or higher), but you don't have symptoms. ? · You think high blood pressure is causing symptoms, such as:  ¨ Severe headache. ¨ Blurry vision. ? Watch closely for changes in your health, and be sure to contact your doctor if:  ? · Your blood pressure measures 140/90 or higher at least 2 times. That means the top number is 140 or higher or the bottom number is 90 or higher, or both. ? · You think you may be having side effects from your blood pressure medicine. ? · Your blood pressure is usually normal, but it goes above normal at least 2 times. Where can you learn more? Go to http://maureen-satish.info/. Enter E482 in the search box to learn more about \"High Blood Pressure: Care Instructions. \"  Current as of: September 21, 2016  Content Version: 11.4  © 1699-4408 [employer]. Care instructions adapted under license by [employer] (which disclaims liability or warranty for this information). If you have questions about a medical condition or this instruction, always ask your healthcare professional. [place] [address] any warranty or liability for your use of this information.          Learning About Medicines That Lower Your Risk of Another Stroke  Introduction  After you have a stroke, going home may be hard, both for you and for your loved ones. There is a lot to think about. Remember to take one day at a time. An important part of your treatment will be to prevent another stroke. And a big part of that is taking medicines. Some of the medicines your doctor may have you take include:  · Aspirin or other blood thinners. They help prevent blood clots. Most strokes are caused by blood clots. · Statins to lower cholesterol. High cholesterol raises your stroke risk. · Medicines for high blood pressure or diabetes. These conditions raise your stroke risk. All medicines can cause side effects. So it is important to understand the pros and cons of any medicine you take. It is also important to take your medicines exactly as your doctor tells you to. Be sure to tell your doctor if you take any other prescription medicine, over-the-counter medicine, vitamins, supplements, or herbal remedies. Have a pill plan  You may have several pills to take every day. Having a plan for how you will remember to take them may help ease your fears and stress. To make a pill plan, write a list of your medicines. Then write down the details for each one. Include when you started using each medicine, when you take it, how much you take each time (number of pills and milligrams in each pill), and any side effects. Before you leave the hospital, be sure to ask questions if you don't understand or need help with your pill plan. And be sure you know who to call when you have questions at home. Blood thinners  One of the best things you can do to prevent another stroke is to take a medicine called a blood thinner. These medicines don't really thin your blood. They work by helping to prevent blood clots. Blood clots can cause a stroke if they block a blood vessel in the brain. So when you prevent blood clots, you help prevent a stroke.   Antiplatelets are a type of blood thinner. They help keep platelets from sticking together and forming blood clots. (A platelet is a type of blood cell.)  Examples of antiplatelets include:  · Aspirin (Michael, Bufferin, Ecotrin). · Aspirin combined with dipyridamole (Aggrenox). · Clopidogrel (Plavix). Another type of blood thinner, called an anticoagulant, may be used if you also have atrial fibrillation. This is a heart rhythm problem. It raises your risk of having a stroke. Be sure to learn how to take your medicine safely. Blood thinners can cause serious bleeding problems. Statins  Statins lower the amount of cholesterol in your blood. If you have too much cholesterol, it starts to build up in blood vessels. And that's how most heart and blood flow problems, including strokes, start. Statins also reduce inflammation around the cholesterol buildup. This may lower the risk that the buildup will break apart and cause a blood clot that can lead to a stroke. Examples of statins include:  · Atorvastatin (Lipitor). · Lovastatin (Mevacor). · Pravastatin (Pravachol). · Simvastatin (Zocor). Blood pressure medicines  If you have high blood pressure, you may take medicines to lower it. High blood pressure damages blood vessels. Damaged vessels clog up more easily. And that can cause a stroke. If you were taking blood pressure pills before, your doctor may have you keep taking them. Or your doctor may have you take a different type. Blood pressure medicines may include:  · ACE (angiotensin-converting enzyme) inhibitors. · Angiotensin II receptor blockers (ARBs). · Beta-blockers. · Diuretics (water pills). · Calcium channel blockers. Follow-up care is a key part of your treatment and safety. Be sure to make and go to all appointments, and call your doctor if you are having problems. It's also a good idea to know your test results and keep a list of the medicines you take. Where can you learn more?   Go to http://maureen-satish.info/. Enter P440 in the search box to learn more about \"Learning About Medicines That Lower Your Risk of Another Stroke. \"  Current as of: March 20, 2017  Content Version: 11.4  © 5858-9910 [employer]. Care instructions adapted under license by [employer] (which disclaims liability or warranty for this information). If you have questions about a medical condition or this instruction, always ask your healthcare professional. [address] any warranty or liability for your use of this information. Stroke: Care Instructions  Your Care Instructions    You have had a stroke. This means that the blood flow to a part of your brain was blocked for some time, which damages the nerve cells in that part of the brain. The part of your body controlled by that part of your brain may not function properly now. The brain is an amazing organ that can heal itself to some degree. The stroke you had damaged part of your brain. But other parts of your brain may take over in some way for the damaged areas. You have already started this process. Your doctor will talk with you about what you can do to prevent another stroke. High blood pressure, high cholesterol, and diabetes are all risk factors for stroke. If you have any of these conditions, work with your doctor to make sure they are under control. Other risk factors for stroke include being overweight, smoking, and not getting regular exercise. Going home may be hard for you and your family. The more you can try to do for yourself, the better. Remember to take each day one at a time. Follow-up care is a key part of your treatment and safety. Be sure to make and go to all appointments, and call your doctor if you are having problems. It's also a good idea to know your test results and keep a list of the medicines you take. How can you care for yourself at home?   ? · Enter a stroke rehabilitation (rehab) program, if your doctor recommends it. Physical, speech, and occupational therapies can help you manage bathing, dressing, eating, and other basics of daily living. ? · Do not drive until your doctor says it is okay. ? · It is normal to feel sad or depressed after a stroke. If these feelings last, talk to your doctor. ? · If you are having problems with urine leakage, go to the bathroom at regular times, including when you first wake up and at bedtime. Also, limit fluids after dinner. ? · If you are constipated, drink plenty of fluids, enough so that your urine is light yellow or clear like water. If you have kidney, heart, or liver disease and have to limit fluids, talk with your doctor before you increase the amount of fluids you drink. Set up a regular time for using the toilet. If you continue to have constipation, your doctor may suggest using a bulking agent, such as Metamucil, or a stool softener, laxative, or enema. Medicines  ? · Take your medicines exactly as prescribed. Call your doctor if you think you are having a problem with your medicine. You may be taking several medicines. ACE (angiotensin-converting enzyme) inhibitors, angiotensin II receptor blockers (ARBs), beta-blockers, diuretics (water pills), and calcium channel blockers control your blood pressure. Statins help lower cholesterol. Your doctor may also prescribe medicines for depression, pain, sleep problems, anxiety, or agitation. ? · If your doctor has given you a blood thinner to prevent another stroke, be sure you get instructions about how to take your medicine safely. Blood thinners can cause serious bleeding problems. ? · Do not take any over-the-counter medicines or herbal products without talking to your doctor first.   ? · If you take birth control pills or hormone therapy, talk to your doctor about whether they are right for you. ? For family members and caregivers  ? · Make the home safe.  Set up a room so that your loved one does not have to climb stairs. Be sure the bathroom is on the same floor. Move throw rugs and furniture that could cause falls. Make sure that the lighting is good. Put grab bars and seats in tubs and showers. ? · Find out what your loved one can do and what he or she needs help with. Try not to do things for your loved one that your loved one can do on his or her own. Help him or her learn and practice new skills. ? · Visit and talk with your loved one often. Try doing activities together that you both enjoy, such as playing cards or board games. Keep in touch with your loved one's friends as much as you can. Encourage them to visit. ? · Take care of yourself. Do not try to do everything yourself. Ask other family members to help. Eat well, get enough rest, and take time to do things that you enjoy. Keep up with your own doctor visits, and make sure to take your medicines regularly. Get out of the house as much as you can. Join a local support group. Find out if you qualify for home health care visits to help with rehab or for adult day care. When should you call for help? Call 911 anytime you think you may need emergency care. For example, call if:  ? · You have signs of another stroke. These may include:  ¨ Sudden numbness, tingling, weakness, or loss of movement in your face, arm, or leg, especially on only one side of your body. ¨ Sudden vision changes. ¨ Sudden trouble speaking. ¨ Sudden confusion or trouble understanding simple statements. ¨ Sudden problems with walking or balance. ¨ A sudden, severe headache that is different from past headaches. Call 911 even if these symptoms go away in a few minutes. ?Call your doctor now or seek immediate medical care if:  ? · You have new symptoms that may be related to your stroke, such as falls or trouble swallowing. ? Watch closely for changes in your health, and be sure to contact your doctor if you have any problems. Where can you learn more? Go to http://maureen-satish.info/. Enter L980 in the search box to learn more about \"Stroke: Care Instructions. \"  Current as of: March 20, 2017  Content Version: 11.4  © 0857-7770 [employer]. Care instructions adapted under license by [employer] (which disclaims liability or warranty for this information). If you have questions about a medical condition or this instruction, always ask your healthcare professional. [place] [address] any warranty or liability for your use of this information.

## 2018-03-21 NOTE — PROGRESS NOTES
Offered the pt FOC for home care, she chose [facility]. Pt verified the contact information on the face sheet is correct. Referral sent to intake via [facility] and called to the [address] [place] [address] for f/u. Care Management Interventions  PCP Verified by CM: Yes  Palliative Care Criteria Met (RRAT>21 & CHF Dx)?: No  Mode of Transport at Discharge:  Other (see comment) (mariel Priceezequiel Lau)  Transition of Care Consult (CM Consult): [address]: Yes  MyChart Signup: No  Discharge Durable Medical Equipment: No  Physical Therapy Consult: Yes  Occupational Therapy Consult: Yes  Speech Therapy Consult: Yes  Current Support Network: Lives Alone (Has next door neighbor )  Confirm Follow Up Transport: Family  Plan discussed with Pt/Family/Caregiver: Yes  Freedom of Choice Offered: Yes  Discharge Location  Discharge Placement: Home with home health

## 2018-03-21 NOTE — PROGRESS NOTES
Neurology Progress Note    Admit Date: 3/19/2018  Length of Stay: 1  Primary Care: Juwan Kate MD   Principle Problem: Stroke [facility])     Assessment:    Stroke     Plan:    MRI showed an acute posterior lateral right thalamic lacunar infarct. Chronic left parietal deep white matter infarct. Mild deep white matter signal changes nonspecific, probable chronic microvascular ischemic disease. TTE showed EF of 70-75% with increased wall thickness consistent with pseudonormal left ventricular filling pattern (grade 2 diastolic dysfunction). No shunt. CTA showed mild to moderate stenosis of right carotid siphon, distal BA, RM!, proximal bilateral A2, and proximal bilateral P2. Incidental finding of 16mm hypodense lesion left thyroid. Defer further work-up if necessary to primary team.    . 4- atorvastatin 80mg in place  HgA1C 9.7- Lantus and lispro in place. Has been out of her insulin and testing strips for past few months. May need CM assistance with acquiring. MRI of brain is pending. TTE pending. Interim History:  No overnight events. She states she feels a little stronger. History: Ms. Hunter Augustin is a 71yo  female with PMH of HTN, DM2, COPD, CAD, dyslipidemia, and MI who presents with left arm numbness and weakness that started about 1.5 hours prior to arrival.  Acute stroke work-up was started in the ED. She was seen by teleneurology who recommended alteplase but the patient refused. She reports occasional bilateral throbbing frontal headaches. Frequent thirst and urination.       Results reviewed:     CT Results (most recent):    CT Head: no acute abnormalities    CTA Head and Neck    Results from Hospital Encounter encounter on 03/19/18   CTA HEAD NECK W CONT   Narrative History: Left arm weakness and tingling, TIA versus CVA    EXAM:  CT angiogram of the head and neck with intravenous contrast.    COMPARISON: Correlation noncontrast CT head same day.    TECHNIQUE:  Three-dimensional, maximum intensity projection, and curved planar  reformations were post-processed at a dedicated outside facility ([employer]). Stenoses are reported with reference to the downstream lumen  (\"NASCET\"). DOSE REDUCTION: One or more dose reduction techniques were used on this CT:  automated exposure control, adjustment of the mAs and/or kVp according to  patient's size, and iterative reconstruction techniques. The specific techniques  utilized on this CT exam have been documented in the patient's electronic  medical record.    _______________    FINDINGS:         ARTERIAL BOLUS QUALITY:  Satisfactory. AORTIC ARCH:  Classic 3 vessel arch anatomy, moderate distal right  subclavian artery atherosclerotic stenosis. No significant innominate stenosis. RIGHT CAROTID ARTERY:  No significant common carotid artery stenosis with  medial tortuosity carotid bifurcation. Eccentric calcified plaque proximal ICA,  less than 20% diameter stenosis by NASCET criteria. Mild to moderate  atherosclerotic carotid siphon stenosis. LEFT CAROTID ARTERY:  No significant common carotid artery stenosis with  medial tortuosity carotid bifurcation. Eccentric calcified plaque proximal ICA  less than 20% diameter stenosis by NASCET criteria. Moderate narrowing due to  focal tortuosity/kinking proximal cervical ICA. Mild atherosclerotic changes  left carotid siphon. VERTEBRAL ARTERIES:  Right vertebral dominant. No significant vertebral  artery stenosis or occlusion. BASILAR AND CEREBRAL ARTERIES: Patent right posterior communicating artery  and anterior communicating artery with developmentally hypoplastic right A1  segment. Moderate short segment stenosis mid to distal basilar artery. Moderate  stenosis short segment distal right M1 segment. Severe areas of stenosis  involving proximal bilateral A2 segments anterior cerebral arteries.  Moderate to  severe areas of stenosis proximal bilateral P2 segment posterior cerebral  arteries. Additional suggestion of mild to moderate areas of stenosis proximal  M2/M3 MCA branches. No evidence of saccular aneurysm or vascular malformation. Dural venous sinuses unremarkable. NON-ANGIOGRAPHIC FINDINGS: No evidence of lung apical mass. No mass or  adenopathy soft tissues of neck. Questionable 16mm diameter hypodense lesion mid  to lower pole left thyroid. Degenerative cervical spondylosis without high-grade  canal stenosis or acute osseous finding. Incidental periapical lucency involving  the root of one of the proximal right maxillary molars with artifact from dental  metal.    _______________         Impression IMPRESSION:        1. No significant cervical carotid stenosis, no significant vertebral stenosis. 2. Mild to moderate atherosclerotic right carotid siphon stenosis. Moderate  right subclavian artery stenosis. 3. Multifocal areas of likely atherosclerotic stenosis most pronounced mid to  distal basilar artery, right M1 segment, proximal bilateral A2 segments, and  proximal bilateral P2 segments. 4. Incidental periapical lucency one of proximal right maxillary molars. 5. Questionable 16mm hypodense lesion left thyroid. Further evaluation thyroid  ultrasound suggested. These results were sent at the time of the exam to the referring physician by  the [facility] on call radiologist.      MRI Results (most recent):    Results from [place] encounter on 03/19/18   MRI BRAIN W WO CONT   Narrative History: Left-sided weakness and tingling, TIA versus CVA    Correlation CT head and CTA head and neck 3/19/2018    PROCEDURE:    Imaging performed on wide bore Discovery [id number] GEM suite 3T magnet at [facility].   Sagittal FLAIR T1, axial spin echo T1, axial TRISTEN T2, FLAIR and  diffusion sequences, axial susceptibility weighted sequence, and axial and  coronal T1 postgadolinium sequences of the brain were obtained. FINDINGS:      Diffusion:  Small focus of restricted diffusion signal posterior lateral right  thalamus. Brain parenchyma:  Chronic appearing infarct involving left parietal deep white  matter adjacent to posterior left lateral ventricle. No cortical signal  abnormality. No evidence of intracranial mass hemorrhage or mass effect. Very  mild confluent and multifocal increased T2 and FLAIR signal bilateral deep  cerebral white matter nonspecific. Ventricles and sulci:  Normal.  No significant atrophy given age. Extra-axial:  No extra-axial fluid collection or mass is noted. Brain vasculature:  No vascular abnormality is appreciated on this routine brain  MR examination. Gadolinium enhancement:  No abnormal enhancement is appreciated. Craniocervical junction:  Normal.    Skull base, extracranial and calvarium:  Orbits, paranasal sinuses, and IACs  unremarkable. Minimal increased T2 signal bilateral mastoids. Partial empty  sella. No skull abnormality. Impression Impression:        1. Acute posterior lateral right thalamic lacunar infarct. 2. Chronic left parietal deep white matter infarct. Mild deep white matter  signal changes nonspecific, probable chronic microvascular ischemic disease.         Latest Hemoglobin A1C:  Lab Results   Component Value Date/Time    Hemoglobin A1c 9.7 (H) 03/20/2018 08:32 AM    Hemoglobin A1c (POC) 9.9 12/01/2014 12:08 PM       Latest Cardiology Procedure:  Results for orders placed or performed during the hospital encounter of 03/19/18   EKG, 12 LEAD, INITIAL   Result Value Ref Range    Ventricular Rate 60 BPM    Atrial Rate 60 BPM    P-R Interval 354 ms    QRS Duration 128 ms    Q-T Interval 438 ms    QTC Calculation (Bezet) 438 ms    Calculated P Axis 87 degrees    Calculated R Axis -56 degrees    Calculated T Axis 31 degrees    Diagnosis       Sinus rhythm with 1st degree AV block  Right bundle branch block  Left anterior fascicular block  Bifascicular block  Voltage criteria for left ventricular hypertrophy  Abnormal ECG  When compared with ECG of 03-NOV-2017 02:52,  Mobitz 1 second degree block no longer  Confirmed by Cami Jaeger ([id number]) on 3/21/2018 8:41:15 AM         Important Labs:  No results found for: FOL, RBCF  Lab Results   Component Value Date/Time    Cholesterol, total 279 (H) 03/20/2018 08:32 AM    HDL Cholesterol 52 03/20/2018 08:32 AM    LDL, calculated 188.4 (H) 03/20/2018 08:32 AM    VLDL, calculated 38.6 03/20/2018 08:32 AM    Triglyceride 193 (H) 03/20/2018 08:32 AM    CHOL/HDL Ratio 5.4 (H) 03/20/2018 08:32 AM     Lab Results   Component Value Date/Time    TSH 1.67 03/20/2018 08:32 AM    Triiodothyronine (T3), free 2.5 03/20/2018 08:32 AM    T4, Free 1.4 03/20/2018 08:32 AM     No results found for: DS35, PHEN, PHENO, PHENT, DILF, DS39, PHENY, PTN, VALF2, VALAC, VALP, VALPR, DS6, CRBAM, CRBAMP, CARB2, XCRBAM  Discussed with:      Allergies: No Known Allergies   Review of Systems:    Y  N   Constitutional: [] [x] recent weight change  [] [] fever  [x] [] sleep difficulties   ENT/Mouth:  [] [] hearing loss  [] [x] swallowing problems  [] [x] slurred speech   Cardiovascular:  [] [x] chest pain   [] [x] palpitations    Respiratory: [] [x] cough with swallow  [] [x] shortness of breath  [] [x] sleep apnea  [] [] intubated   Gastrointestinal: [] [x] abdominal pain  [] [x] nausea   Genitourinary: [x] [] frequent urination  [] [x] incontinence    Musculoskeletal:   [] [x] joint pain  [] [x] muscle pain   Integument:   [] [x] rash/itching   Neurological:  [] [x] dizziness/vertigo  [] [x] sedation  [] [] confusion  [] [] agitation/combativeness  [] [] loss of consciousness  [x] [] numbness/tingling sensation  [] [] tremors  [x] [] weakness in limbs  [x] [] difficulty with balance  [x] [] frequent or recurring headaches  [] [] memory loss   [] [] comatose  [] [] seizures   Psychiatric:   [] [] depression  [] [] hallucinations Endocrine: [] [] excessive thirst or urination   [] [] heat or cold intolerance   Hematologic/Lymphatic: [] [] bleeding tendency  [] [] enlarged lymph nodes     PMH:   Past Medical History:   Diagnosis Date    Arthritis     Bilateral cataracts     sees Dr. Calvin Noe    COPD     Coronary artery disease     Cx - 2.75 x 33mm XIENCE 10/13     Diabetes     Dyslipidemia     Hypertension     MI (myocardial infarction)     Noncompliance     Obesity     TIA (transient ischemic attack) 3/20/2018        Problem List: Principal Problem:    Stroke ([address] (3/20/2018)    Active Problems:    Diabetes ()      Hypertension ()      Dyslipidemia ()        FH:   Family History   Problem Relation Age of Onset    Arthritis-osteo Mother     Heart Disease Mother     Alcohol abuse Neg Hx     Asthma Neg Hx     Bleeding Prob Neg Hx     Cancer Neg Hx     Diabetes Neg Hx     Elevated Lipids Neg Hx     Headache Neg Hx     Hypertension Neg Hx     Lung Disease Neg Hx     Migraines Neg Hx     Psychiatric Disorder Neg Hx     Stroke Neg Hx     Mental Retardation Neg Hx         SH:   Social History     Social History    Marital status:      Spouse name: N/A    Number of children: N/A    Years of education: N/A     Social History Main Topics    Smoking status: Former Smoker     Packs/day: 1.00     Years: 54.00     Start date: 2/10/2014    Smokeless tobacco: Never Used      Comment: pt states she uses e-cigs to stop smoking    Alcohol use No    Drug use: No    Sexual activity: Not Currently     Other Topics Concern    Not on file     Social History Narrative          Vital Signs:   Visit Vitals    /80 (BP 1 Location: Left arm, BP Patient Position: At rest)    Pulse 60    Temp 97.4 °F (36.3 °C)    Resp 16    Ht 5' 3\" (1.6 m)    Wt 103.4 kg (228 lb)    SpO2 97%    Breastfeeding No    BMI 40.39 kg/m2      Neurological examination:    Appearance:  In no acute distress, well developed, well nourished, cooperative   Cardiovascular: Heart is regular rate and rhythm. 1+ peripheral non pitting edema to right calf. No carotid bruits heard   Mental status examination: Alert and oriented X 4. Speech slight slurring but normal language. Normal attention, memory and fund of knowledge.  Cranial Nerves:     I: smell Not tested   II: visual fields Visual fields were intact to confrontation  Eye movements were full and conjugate, saccades were accurate, pursuit movements were smooth, and there was no nystagmus. II: pupils Equal, round, reactive to light   III,VII: ptosis none   III,IV,VI: extraocular muscles  Full ROM   V: facial light touch sensation  normal   V,VII: corneal reflex     VII: facial muscle function  normal   VIII: hearing    IX: soft palate elevation  Normal. The palate and tongue moved in the midline. IX,X: gag reflex present   XI: sternocleidomastoid strength 5/5   XII: tongue strength  Normal.          Motor exam: Station, gait:  deferred. Muscle tone, bulk and manual muscle testing: LUE 4/5 and LLE 4+/5. Right side normal. Spacticity absent.  Sensory: Intact to primary modalities. Paraesthesia with sharp to bilateral lower extremities from mid-calf down.  Coordination: Normal rapid alternating movements, finger to nose testing.              Medications:      [x] REVIEWED  Current Facility-Administered Medications   Medication    gabapentin (NEURONTIN) capsule 300 mg    nitroglycerin (NITROSTAT) tablet 0.4 mg    ondansetron (ZOFRAN) injection 2 mg    aspirin (ASPIRIN) tablet 325 mg    atorvastatin (LIPITOR) tablet 80 mg    acetaminophen (TYLENOL) tablet 650 mg    acetaminophen (TYLENOL) suppository 650 mg    senna-docusate (PERICOLACE) 8.6-50 mg per tablet 2 Tab    pantoprazole (PROTONIX) tablet 40 mg    Or    pantoprazole (PROTONIX) granules for oral suspension 40 mg    Or    pantoprazole (PROTONIX) 40 mg in sodium chloride 10 mL injection    albuterol (PROVENTIL VENTOLIN) nebulizer solution 2.5 mg    heparin (porcine) injection 5,000 Units    nicotine (NICODERM CQ) 14 mg/24 hr patch 1 Patch    insulin lispro (HUMALOG) injection    folic acid (FOLVITE) tablet 1 mg    Thiamine Mononitrate (B-1) tablet 100 mg    insulin lispro (HUMALOG) injection 3 Units    insulin glargine (LANTUS) injection 12 Units    0.9% sodium chloride infusion     Data:      [x] REVIEWED  Recent Results (from the past 24 hour(s))   GLUCOSE, POC    Collection Time: 03/20/18  4:55 PM   Result Value Ref Range    Glucose (POC) 80 70 - 110 mg/dL   GLUCOSE, POC    Collection Time: 03/20/18  9:30 PM   Result Value Ref Range    Glucose (POC) 160 (H) 70 - 110 mg/dL   GLUCOSE, POC    Collection Time: 03/21/18  7:52 AM   Result Value Ref Range    Glucose (POC) 108 70 - 110 mg/dL   GLUCOSE, POC    Collection Time: 03/21/18 11:41 AM   Result Value Ref Range    Glucose (POC) 220 (H) 70 - 110 mg/dL       Nisa Lynn NP

## 2018-03-21 NOTE — PROGRESS NOTES
Problem: Dysphagia (Adult)  Goal: *Acute Goals and Plan of Care (Insert Text)  Recommendations:  Diet: Regular/thin  Meds: Per pt preference  Oral Care TID    Goals:  Patient will:  1. Tolerate PO trials with 0 s/s overt distress in 4/5 trials - goal met  2. Utilize compensatory swallow strategies/maneuvers (decrease bite/sip, size/rate, alt. liq/sol) with min cues in 4/5 trials - goal met         Outcome: Resolved/Met Date Met: 03/21/18  Speech language pathology dysphagia treatment & discharge    Patient: Wendy Lizama (79 y.o. female)  Date: 3/21/2018  Diagnosis: TIA (transient ischemic attack) Stroke [facility])       Precautions:  (none)  PLOF: Living with family, regular diet    ASSESSMENT:  Follow up this am with skilled meal assessment with regular solid/thin liquid breakfast. Pt exhibited adequate bolus cohesion, manipulation and A-P transit. Further exhibited adequate swallow timing/reflex and hyolaryngeal excursion. Able to manipulate and clear with 0 clinical s/s aspiration and/or oropharyngeal dysphagia. Pt continues safe for regular solid, thin liquid diet. Maximum therapeutic gains met; safest, least restrictive diet achieved in current in-patient/acute setting. Accordingly, SLP to d/c intervention at this time. Progression toward goals:  [x]         Improving appropriately - goals met/approximated  []         Not making progress/Not appropriate - will d/c POC     PLAN:  Recommendations and Planned Interventions:  Maximum therapeutic gains met; safest, least restrictive diet achieved. D/C ST intervention at this time. Discharge Recommendations:  None     SUBJECTIVE:   Patient stated I don't like to eat much bread.     OBJECTIVE:   Cognitive and Communication Status:  Neurologic State: Alert  Orientation Level: Oriented X4  Cognition: Follows commands  Perception: Appears intact  Perseveration: No perseveration noted  Safety/Judgement: Good awareness of safety precautions  Dysphagia Treatment:  Oral Assessment:  Oral Assessment  Labial: No impairment  Dentition: Limited  Oral Hygiene: Good  Lingual: No impairment  Velum: Unable to visualize  Mandible: No impairment  P.O. Trials:   Patient Position: Chair 90   Vocal quality prior to P.O.: Hoarse   Consistency Presented: Thin liquid, Solid   How Presented: Self-fed/presented, Successive swallows, Straw       Bolus Acceptance: No impairment   Bolus Formation/Control: No impairment       Propulsion: No impairment   Oral Residue: Lingual, Less than 10% of bolus   Initiation of Swallow: Delayed (# of seconds)   Laryngeal Elevation: Functional   Aspiration Signs/Symptoms: None   Pharyngeal Phase Characteristics: No impairment, issues, or problems    Effective Modifications: Alternate liquids/solids, Small sips and bites   Cues for Modifications: Minimal         Oral Phase Severity: No impairment   Pharyngeal Phase Severity : No impairment     PAIN:  Start of Tx: 0  End of Tx: 0     GCODESwallowing:  Swallow Goal Status CH= 0%   Swallow D/C Status CH= 0%    The severity rating is based on the following outcomes:  BRANDEN Noms Swallow Level 7    Clinical Judgement    After treatment:   [x]              Patient left in no apparent distress sitting up in chair  []              Patient left in no apparent distress in bed  [x]              Call bell left within reach  [x]              Nursing notified  []              Caregiver present  []              Bed alarm activated      COMMUNICATION/EDUCATION:   [x] Aspiration precautions; swallow safety; compensatory techniques  []        Patient unable to participate in education; education ongoing with staff  []  Posted safety precautions in patient's room.   [] Oral-motor/laryngeal strengthening exercises    Milton Lorenzo SLP  Time Calculation: 15 mins

## 2018-03-21 NOTE — PROGRESS NOTES
Spoke with pt agrees to hh already been set up by danyell. Her niece will pick her up. She will call her now.

## 2018-03-21 NOTE — PROGRESS NOTES
Assumed patient care. Received patient awake, alert, oriented X4. Patient denies any pain at this time. Dual NIH done with Polina Brown RN. Not in acute distress.

## 2018-03-21 NOTE — PROGRESS NOTES
Problem: Falls - Risk of  Goal: *Absence of Falls  Document Elena Fall Risk and appropriate interventions in the flowsheet.    Outcome: Progressing Towards Goal  Fall Risk Interventions:  Mobility Interventions: Patient to call before getting OOB, PT Consult for mobility concerns, PT Consult for assist device competence, Utilize walker, cane, or other assitive device         Medication Interventions: Patient to call before getting OOB, Teach patient to arise slowly    Elimination Interventions: Call light in reach, Patient to call for help with toileting needs, Toileting schedule/hourly rounds    History of Falls Interventions: Consult care management for discharge planning

## 2018-03-21 NOTE — PROGRESS NOTES
Problem: Falls - Risk of  Goal: *Absence of Falls  Document Elena Fall Risk and appropriate interventions in the flowsheet.    Outcome: Progressing Towards Goal  Fall Risk Interventions:  Mobility Interventions: Patient to call before getting OOB         Medication Interventions: Evaluate medications/consider consulting pharmacy, Patient to call before getting OOB    Elimination Interventions: Call light in reach, Patient to call for help with toileting needs, Toilet paper/wipes in reach    History of Falls Interventions: Evaluate medications/consider consulting pharmacy

## 2018-03-21 NOTE — DISCHARGE SUMMARY
Internal Medicine Discharge Summary        Patient: Feroz Agosto    YOB: 1945    Age:  81 y.o. Admit Date: 3/19/2018    Discharge Date: 3/21/2018    LOS:  LOS: 1 day     Discharge To: Home    Consults: Neurology    Admission Diagnoses: TIA (transient ischemic attack)    Discharge Diagnoses:    Problem List as of 3/21/2018  Date Reviewed: 2/27/2014          Codes Class Noted - Resolved    * (Principal)Stroke ([facility].) ICD-10-CM: I63.9  ICD-9-CM: 434.91  3/20/2018 - Present        Chest pain ICD-10-CM: R07.9  ICD-9-CM: 786.50  12/8/2014 - Present        Chest pain at rest ICD-10-CM: R07.9  ICD-9-CM: 786.50  12/8/2014 - Present        Uncontrolled diabetes mellitus ([facility] ICD-10-CM: E11.65  ICD-9-CM: 250.02  3/25/2014 - Present        Diabetes ICD-10-CM: E11.9  ICD-9-CM: 250.00  Unknown - Present        Hypertension ICD-10-CM: I11.9  ICD-9-CM: 402.10  Unknown - Present        Dyslipidemia ICD-10-CM: E78.5  ICD-9-CM: 272.4  Unknown - Present        Coronary artery disease ICD-10-CM: I25.10  ICD-9-CM: 414.01  Unknown - Present    Overview Signed 2/27/2014 10:05 AM by Etienne Magana MD     Cx - 2.75 x 33mm XIENCE 10/13                    Discharge Condition:  Improved    Procedures: None         Hospital Course: Feroz Agosto is a 81 y.o. female who is being admitted for ? TIA   Pt mentions she had tingling & numbness of her Left hand / arm yesterday -- it has improved now -- she says it felt like this when she had her MI 5 yrs ago so she decided to come to the ER for further eval. No known trigger. No associated sob, chest discomfort, sweats, syncope, near syncope, neck, head pain, other weakness, visual disturbance, diff swallowing, speech issue. Took 2 baby aspirin and called ems who brought her to the ER for further eval. Seen by tele Neuro -- recommended admission for further eval. Imaging was completed and showed acute posterior lateral right thalamic lacunar infarct.  She was continued on ASA and high dose statin. Echo results listed below. PT recommended home health. Her symptoms were slightly better on the day of discharge and she was eager to get home. She should continue strict BP control and diabetes management. Neurology was following the patient during admission as well and agreed with the plans. The rest of the patient's chronic conditions were managed appropriately during their admission. They were medically stable at the time of discharge. ECHO:  Left ventricle: Systolic function was vigorous. Ejection fraction was   estimated in the range of 70 % to 75 %. No  obvious wall motion abnormalities identified in the views obtained. Wall   thickness was mildly increased. Features were  consistent with a pseudonormal left ventricular filling pattern, with   concomitant abnormal relaxation and increased  filling pressure (grade 2 diastolic dysfunction). Right ventricle: The ventricle was dilated. Left atrium: The atrium was dilated. Atrial septum: There was no evidence of intracardiac shunt by   peripherally-administered agitated saline contrast.    Right atrium: The atrium was dilated. Mitral valve: There was mild annular calcification. There was mild   regurgitation. Aortic valve: Transaortic velocity was increased due to increased   transvalvular flow. Tricuspid valve: There was mild regurgitation.     Visit Vitals    /80 (BP 1 Location: Left arm, BP Patient Position: At rest)    Pulse 60    Temp 97.4 °F (36.3 °C)    Resp 16    Ht 5' 3\" (1.6 m)    Wt 103.4 kg (228 lb)    SpO2 97%    Breastfeeding No    BMI 40.39 kg/m2       Physical Exam at Discharge:  General Appearance: NAD, conversant  HENT: normocephalic/atraumatic, moist mucus membranes  Lungs: CTA with normal respiratory effort  CV: RRR, no m/r/g  Abdomen: soft, non-tender, normal bowel sounds  Extremities: no cyanosis, no peripheral edema  Neuro: moves all extremities, 4/5 left arm/left leg w/ sensory deficit  Psych: appropriate affect, alert and oriented to person, place and time    Labs Prior to Discharge:  Labs: Results:       Chemistry Recent Labs      03/19/18 2200   GLU  304*   NA  139   K  4.3   CL  102   CO2  28   BUN  22*   CREA  1.16   CA  8.6   AGAP  9   BUCR  19   AP  125*   TP  7.0   ALB  3.0*   GLOB  4.0   AGRAT  0.8      CBC w/Diff Recent Labs      03/19/18 2200   WBC  6.1   RBC  5.07   HGB  15.1   HCT  44.1   PLT  160   GRANS  69   LYMPH  20*   EOS  1      Cardiac Enzymes No results for input(s): CPK, CKND1, DAYAN in the last 72 hours. No lab exists for component: CKRMB, TROIP   Coagulation Recent Labs      03/19/18 2200   PTP  13.5   INR  1.1       Lipid Panel Lab Results   Component Value Date/Time    Cholesterol, total 279 (H) 03/20/2018 08:32 AM    HDL Cholesterol 52 03/20/2018 08:32 AM    LDL, calculated 188.4 (H) 03/20/2018 08:32 AM    VLDL, calculated 38.6 03/20/2018 08:32 AM    Triglyceride 193 (H) 03/20/2018 08:32 AM    CHOL/HDL Ratio 5.4 (H) 03/20/2018 08:32 AM      BNP No results for input(s): BNPP in the last 72 hours. Liver Enzymes Recent Labs      03/19/18 2200   TP  7.0   ALB  3.0*   AP  125*   SGOT  19      Thyroid Studies Lab Results   Component Value Date/Time    TSH 1.67 03/20/2018 08:32 AM            Significant Imaging:  Mri Brain W Wo Cont    Result Date: 3/20/2018  History: Left-sided weakness and tingling, TIA versus CVA Correlation CT head and CTA head and neck 3/19/2018 PROCEDURE: Imaging performed on wide bore Discovery [id number] [employer] suite 3T magnet at [facility]. Sagittal FLAIR T1, axial spin echo T1, axial TRISTEN T2, FLAIR and diffusion sequences, axial susceptibility weighted sequence, and axial and coronal T1 postgadolinium sequences of the brain were obtained. FINDINGS:  Diffusion:  Small focus of restricted diffusion signal posterior lateral right thalamus.  Brain parenchyma:  Chronic appearing infarct involving left parietal deep white matter adjacent to posterior left lateral ventricle. No cortical signal abnormality. No evidence of intracranial mass hemorrhage or mass effect. Very mild confluent and multifocal increased T2 and FLAIR signal bilateral deep cerebral white matter nonspecific. Ventricles and sulci:  Normal.  No significant atrophy given age. Extra-axial:  No extra-axial fluid collection or mass is noted. Brain vasculature:  No vascular abnormality is appreciated on this routine brain MR examination. Gadolinium enhancement:  No abnormal enhancement is appreciated. Craniocervical junction:  Normal. Skull base, extracranial and calvarium:  Orbits, paranasal sinuses, and IACs unremarkable. Minimal increased T2 signal bilateral mastoids. Partial empty sella. No skull abnormality. Impression: 1. Acute posterior lateral right thalamic lacunar infarct. 2. Chronic left parietal deep white matter infarct. Mild deep white matter signal changes nonspecific, probable chronic microvascular ischemic disease. Ct Head Wo Cont    Result Date: 3/19/2018  EXAM: CT head INDICATION: CODE S COMPARISON: None. TECHNIQUE: Axial CT imaging of the head was performed without intravenous contrast. DOSE REDUCTION:  One or more dose reduction techniques were used on this CT: automated exposure control, adjustment of the mAs and/or kVp according to patient's size, and iterative reconstruction techniques. The specific techniques utilized on this CT exam have been documented in the patient's electronic medical record. _______________ FINDINGS: BRAIN AND POSTERIOR FOSSA: There is age-appropriate atrophy. There is no intracranial hemorrhage, mass effect, or midline shift. Old lacunar infarct seen in the left corona radiata. EXTRA-AXIAL SPACES AND MENINGES: There are no abnormal extra-axial fluid collections. CALVARIUM: Intact. SINUSES: Clear. OTHER: None. _______________     IMPRESSION: CRITICAL RESULT: No acute intracranial abnormalities.  Dr. Carlito Gamez informed 11:05 PM March 19, 2018. Xr Chest Port    Result Date: 3/20/2018  Chest, single view Indication: Left arm weakness, suspected stroke. Comparison: Several prior chest radiographs, most recently 11/2/2017 Findings:  Portable upright AP view of the chest was obtained. The lungs are underexpanded. No focal pneumonic opacity, pneumothorax, or pleural effusion. Cardiac size and mediastinal contours are stable. No acute osseous abnormality. .     Impression: Underexpanded lungs without superimposed acute radiographic abnormality. Cta Head Neck W Cont    Result Date: 3/20/2018  History: Left arm weakness and tingling, TIA versus CVA EXAM:  CT angiogram of the head and neck with intravenous contrast. COMPARISON: Correlation noncontrast CT head same day. TECHNIQUE:  Three-dimensional, maximum intensity projection, and curved planar reformations were post-processed at a dedicated outside facility ([id number]). Stenoses are reported with reference to the downstream lumen (\"NASCET\"). DOSE REDUCTION: One or more dose reduction techniques were used on this CT: automated exposure control, adjustment of the mAs and/or kVp according to patient's size, and iterative reconstruction techniques. The specific techniques utilized on this CT exam have been documented in the patient's electronic medical record. _______________ FINDINGS:      ARTERIAL BOLUS QUALITY:  Satisfactory. AORTIC ARCH:  Classic 3 vessel arch anatomy, moderate distal right subclavian artery atherosclerotic stenosis. No significant innominate stenosis. RIGHT CAROTID ARTERY:  No significant common carotid artery stenosis with medial tortuosity carotid bifurcation. Eccentric calcified plaque proximal ICA, less than 20% diameter stenosis by NASCET criteria. Mild to moderate atherosclerotic carotid siphon stenosis. LEFT CAROTID ARTERY:  No significant common carotid artery stenosis with medial tortuosity carotid bifurcation.  Eccentric calcified plaque proximal ICA less than 20% diameter stenosis by NASCET criteria. Moderate narrowing due to focal tortuosity/kinking proximal cervical ICA. Mild atherosclerotic changes left carotid siphon. VERTEBRAL ARTERIES:  Right vertebral dominant. No significant vertebral artery stenosis or occlusion. BASILAR AND CEREBRAL ARTERIES: Patent right posterior communicating artery and anterior communicating artery with developmentally hypoplastic right A1 segment. Moderate short segment stenosis mid to distal basilar artery. Moderate stenosis short segment distal right M1 segment. Severe areas of stenosis involving proximal bilateral A2 segments anterior cerebral arteries. Moderate to severe areas of stenosis proximal bilateral P2 segment posterior cerebral arteries. Additional suggestion of mild to moderate areas of stenosis proximal M2/M3 MCA branches. No evidence of saccular aneurysm or vascular malformation. Dural venous sinuses unremarkable. NON-ANGIOGRAPHIC FINDINGS: No evidence of lung apical mass. No mass or adenopathy soft tissues of neck. Questionable 16mm diameter hypodense lesion mid to lower pole left thyroid. Degenerative cervical spondylosis without high-grade canal stenosis or acute osseous finding. Incidental periapical lucency involving the root of one of the proximal right maxillary molars with artifact from dental metal. _______________     IMPRESSION: 1. No significant cervical carotid stenosis, no significant vertebral stenosis. 2. Mild to moderate atherosclerotic right carotid siphon stenosis. Moderate right subclavian artery stenosis. 3. Multifocal areas of likely atherosclerotic stenosis most pronounced mid to distal basilar artery, right M1 segment, proximal bilateral A2 segments, and proximal bilateral P2 segments. 4. Incidental periapical lucency one of proximal right maxillary molars. 5. Questionable 16mm hypodense lesion left thyroid. Further evaluation thyroid ultrasound suggested. These results were sent at the time of the exam to the referring physician by the [facility] on call radiologist.           Discharge Medications:     Current Discharge Medication List      START taking these medications    Details   aspirin (ASPIRIN) 325 mg tablet Take 1 Tab by mouth daily. Qty: 30 Tab, Refills: 0      atorvastatin (LIPITOR) 80 mg tablet Take 1 Tab by mouth nightly. Qty: 30 Tab, Refills: 0         CONTINUE these medications which have NOT CHANGED    Details   Lancets misc Use tid  Qty: 1 Package, Refills: 11      glucose blood VI test strips (ASCENSIA AUTODISC VI, ONE TOUCH ULTRA TEST VI) strip by Does Not Apply route See Admin Instructions. Use tid  Qty: 1 Package, Refills: 11      Blood-Glucose Meter monitoring kit Use tid  Qty: 1 kit, Refills: 0      insulin aspart (NOVOLOG FLEXPEN) 100 unit/mL flexpen 3 Units by SubCUTAneous route Before breakfast, lunch, and dinner. Qty: 1 each, Refills: 1      Insulin Needles, Disposable, (LITE TOUCH INSULIN PEN NEEDLES) 31 X 1/4 \" ndle 1 each by Does Not Apply route five (5) times daily. Qty: 100 each, Refills: 3      glucose 4 gram chewable tablet Take 4 tablets by mouth as needed (hypoglcemia). Qty: 100 tablet, Refills: 1      insulin glargine (LANTUS SOLOSTAR) 100 unit/mL (3 mL) pen 10 Units by SubCUTAneous route daily. Qty: 1 Package, Refills: 1      gabapentin (NEURONTIN) 300 mg capsule Take 1 capsule by mouth nightly. Qty: 30 capsule, Refills: 1      !! Insulin Needles, Disposable, 31 X 5/16 \" ndle 1qd  Qty: 1 Package, Refills: 11      nitroglycerin (NITROSTAT) 0.4 mg SL tablet 1 Tab by SubLINGual route every five (5) minutes as needed for Chest Pain. Qty: 1 Bottle, Refills: 2      !! Insulin Needles, Disposable, 31 X 5/16 \" Ndle Use bid  Qty: 1 Package, Refills: 11      acetaminophen (TYLENOL EXTRA STRENGTH) 500 mg tablet Take 2 Tabs by mouth every six (6) hours as needed for Pain.   Qty: 40 Tab, Refills: 0       !! - Potential duplicate medications found. Please discuss with provider. STOP taking these medications       aspirin 81 mg chewable tablet Comments:   Reason for Stopping:               Activity: PT/OT per Home Health    Diet: Resume previous diet    Wound Care: None needed    Follow-up:   Please follow up with your PCP within 7 days to discuss your recent hospitalization. Needs further workup for incidental thyroid nodule found on imaging. Patient to arrange.          Total time spent including time spent on final examination and discharge discussion, discharge documentation and records reviewed and medication reconciliation: > 30 minutes    Ravindra Mckeon DO  Internal Medicine, Hospitalist  Pager: [phone]

## 2018-03-21 NOTE — PROGRESS NOTES
Problem: Mobility Impaired (Adult and Pediatric)  Goal: *Acute Goals and Plan of Care (Insert Text)  Physical Therapy Goals  Initiated 3/20/2018 and to be accomplished within 7 day(s)  1. Patient will move from supine to sit and sit to supine  in bed with modified independence. 2.  Patient will transfer from bed to chair and chair to bed with modified independence using the least restrictive device. 3.  Patient will perform sit to stand with modified independence. 4.  Patient will ambulate with modified independence for 250 feet with the least restrictive device. 5.  Patient will ascend/descend 4 stairs with handrail(s) with modified independence. Outcome: Progressing Towards Goal  physical Therapy TREATMENT    Patient: Arley Foster (79 y.o. female)  Date: 3/21/2018  Diagnosis: TIA (transient ischemic attack) Stroke [facility])  Precautions:  (none)  Chart, physical therapy assessment, plan of care and goals were reviewed. PLOF:  Independent    ASSESSMENT:  Supervision for sit to stand from recliner. Completed 3 times. Impulsive with movement with poor safety awareness of self and lines. Verbal cues for safety modifications to movement for lower body and upper body dressing. Poor negotiation of obstacle on left side with amb. Denies visual/perceptual deficits. Min A for correct of loss of balance to prevent falls to left. Amb 50ft with supervision and IV pole. Scissoring with gait at times. Completed 2 stairs with supervision and one hand rail. Verbalized safety techniques for stair to prevent falls. Returned to seated in chair. Call bell in reach. Educated on need for RN assistance with mobility. EDUCATION: transfers, amb, balance, safety, stairs  Progression toward goals:        Improving appropriately and progressing toward goals     PLAN:  Patient continues to benefit from skilled intervention to address the above impairments. Continue treatment per established plan of care.   Discharge Recommendations:  Home Health  Further Equipment Recommendations for Discharge:  N/A     SUBJECTIVE:   Patient stated I want to get out of here.     OBJECTIVE DATA SUMMARY:   Critical Behavior:  Neurologic State: Alert  Orientation Level: Oriented X4  Cognition: Poor safety awareness  Safety/Judgement: Decreased insight into deficits    [employer] Balance Scale 3+/5  0: Pt performs 25% or less of standing activity (Max assist) CN, 100% impaired. 1: Pt supports self with upper extremities but requires therapist assistance. Pt performs 25-50% of effort (Mod assist) CM, 80% to <100% impaired. 1+: Pt supports self with upper extremities but requires therapist assistance. Pt performs >50% effort. (Min assist). CL, 60% to <80% impaired. 2: Pt supports self independently with both upper extremities (walker, crutches, parallel bars). CL, 60% to <80% impaired. 2+: Pt support self independently with 1 upper extremity (cane, crutch, 1 parallel bar). CK, 40% to <60% impaired. 3: Pt stands without upper extremity support for up to 30 seconds. CK, 40% to <60% impaired. 3+: Pt stands without upper extremity support for 30 seconds or greater. CJ, 20% to <40% impaired. 4: Pt independently moves and returns center of gravity 1-2 inches in one plane. CJ, 20% to <40% impaired. 4+: Pt independently moves and returns center of gravity 1-2 inches in multiple planes. CI, 1% to <20% impaired. 5: Pt independently moves and returns center of gravity in all planes greater than 2 inches. CH, 0% impaired. G CODE:Mobility [id number] Current  CJ= 20-39%   Goal  CI= 1-19%.   The severity rating is based on the [employer] Balance Scale 3+/5  Functional Mobility Training:  Bed Mobility:  Supine to Sit:  (seated in chair)  Sit to Supine:  (seated in chair)  Transfers:  Sit to Stand: Supervision  Stand to Sit: Supervision  Balance:  Sitting: Intact  Standing: Impaired  Standing - Static: Fair  Standing - Dynamic : Fair  Ambulation/Gait Training:  Distance (ft): 50 Feet (ft)  Ambulation - Level of Assistance: Supervision  Gait Description (WDL): Exceptions to WDL  Gait Abnormalities: Scissoring  Stairs:  Number of Stairs Trained: 2  Stairs - Level of Assistance: Supervision  Rail Use: Left   Therapeutic Exercises:   Sit to stand x3  Pain:  Pre treatment pain level: 0  Post treatment pain level: 0  Pain Scale 1: Numeric (0 - 10)  Pain Intensity 1: 0  Activity Tolerance:   Fair     After treatment:   Patient left in no apparent distress sitting up in chair*  Call bell left within reach  Nursing notified    Abhishek Romano PT   Time Calculation: 8 mins

## 2018-03-21 NOTE — PROGRESS NOTES
Bedside and Verbal shift change report given to Nicholas Ceballos RN (oncoming nurse) by Bailee Azul RN (offgoing nurse). Report included the following information SBAR, Kardex and MAR. Assessment completed. Dual NIH completed. AAOX4, calm and cooperative. Patient denies pain at this time. Patient resting in chair comfortably. 0830- Bedside and Verbal shift change report given to Aide Nichols (oncoming nurse) by Nicholas Ceballos RN (offgoing nurse). Report included the following information SBAR, Kardex and MAR. Dual NIH completed. Patient resting in bed.

## 2018-03-22 ENCOUNTER — APPOINTMENT (OUTPATIENT)
Dept: CT IMAGING | Age: 73
End: 2018-03-22
Attending: EMERGENCY MEDICINE
Payer: MEDICARE

## 2018-03-22 ENCOUNTER — APPOINTMENT (OUTPATIENT)
Dept: GENERAL RADIOLOGY | Age: 73
End: 2018-03-22
Attending: EMERGENCY MEDICINE
Payer: MEDICARE

## 2018-03-22 ENCOUNTER — HOSPITAL ENCOUNTER (EMERGENCY)
Age: 73
Discharge: HOME | End: 2018-03-22
Attending: EMERGENCY MEDICINE
Payer: MEDICARE

## 2018-03-22 ENCOUNTER — HOME CARE VISIT (OUTPATIENT)
Dept: HOME HEALTH SERVICES | Facility: HOME HEALTH | Age: 73
End: 2018-03-22

## 2018-03-22 VITALS
HEART RATE: 50 BPM | OXYGEN SATURATION: 99 % | DIASTOLIC BLOOD PRESSURE: 68 MMHG | TEMPERATURE: 98 F | SYSTOLIC BLOOD PRESSURE: 142 MMHG | RESPIRATION RATE: 18 BRPM

## 2018-03-22 DIAGNOSIS — I63.9 CEREBROVASCULAR ACCIDENT (CVA), UNSPECIFIED MECHANISM (HCC): Primary | ICD-10-CM

## 2018-03-22 LAB
ALBUMIN SERPL-MCNC: 2.6 G/DL (ref 3.4–5)
ALBUMIN/GLOB SERPL: 0.7 {RATIO} (ref 0.8–1.7)
ALP SERPL-CCNC: 99 U/L (ref 45–117)
ALT SERPL-CCNC: 18 U/L (ref 13–56)
AMPHET UR QL SCN: NEGATIVE
ANION GAP SERPL CALC-SCNC: 6 MMOL/L (ref 3–18)
APPEARANCE UR: CLEAR
AST SERPL-CCNC: 14 U/L (ref 15–37)
ATRIAL RATE: 51 BPM
BACTERIA URNS QL MICRO: ABNORMAL /HPF
BARBITURATES UR QL SCN: NEGATIVE
BENZODIAZ UR QL: NEGATIVE
BILIRUB SERPL-MCNC: 0.2 MG/DL (ref 0.2–1)
BILIRUB UR QL: NEGATIVE
BUN SERPL-MCNC: 20 MG/DL (ref 7–18)
BUN/CREAT SERPL: 17 (ref 12–20)
CALCIUM SERPL-MCNC: 7.8 MG/DL (ref 8.5–10.1)
CALCULATED P AXIS, ECG09: 72 DEGREES
CALCULATED R AXIS, ECG10: -36 DEGREES
CALCULATED T AXIS, ECG11: 21 DEGREES
CANNABINOIDS UR QL SCN: NEGATIVE
CHLORIDE SERPL-SCNC: 109 MMOL/L (ref 100–108)
CO2 SERPL-SCNC: 27 MMOL/L (ref 21–32)
COCAINE UR QL SCN: NEGATIVE
COLOR UR: YELLOW
CREAT SERPL-MCNC: 1.17 MG/DL (ref 0.6–1.3)
DIAGNOSIS, 93000: NORMAL
EPITH CASTS URNS QL MICRO: ABNORMAL /LPF (ref 0–5)
ERYTHROCYTE [DISTWIDTH] IN BLOOD BY AUTOMATED COUNT: 12.7 % (ref 11.6–14.5)
FIBRINOGEN PPP-MCNC: 528 MG/DL (ref 210–451)
GLOBULIN SER CALC-MCNC: 3.6 G/DL (ref 2–4)
GLUCOSE SERPL-MCNC: 249 MG/DL (ref 74–99)
GLUCOSE UR STRIP.AUTO-MCNC: >1000 MG/DL
HCT VFR BLD AUTO: 38.7 % (ref 35–45)
HDSCOM,HDSCOM: NORMAL
HGB BLD-MCNC: 13.3 G/DL (ref 12–16)
HGB UR QL STRIP: ABNORMAL
HYALINE CASTS URNS QL MICRO: ABNORMAL /LPF (ref 0–2)
INR PPP: 1.1 (ref 0.8–1.2)
KETONES UR QL STRIP.AUTO: NEGATIVE MG/DL
LEUKOCYTE ESTERASE UR QL STRIP.AUTO: NEGATIVE
MCH RBC QN AUTO: 29.9 PG (ref 24–34)
MCHC RBC AUTO-ENTMCNC: 34.4 G/DL (ref 31–37)
MCV RBC AUTO: 87 FL (ref 74–97)
METHADONE UR QL: NEGATIVE
NITRITE UR QL STRIP.AUTO: NEGATIVE
OPIATES UR QL: NEGATIVE
P-R INTERVAL, ECG05: 190 MS
PCP UR QL: NEGATIVE
PH UR STRIP: 5 [PH] (ref 5–8)
PLATELET # BLD AUTO: 146 K/UL (ref 135–420)
PMV BLD AUTO: 11.2 FL (ref 9.2–11.8)
POTASSIUM SERPL-SCNC: 3.7 MMOL/L (ref 3.5–5.5)
PROT SERPL-MCNC: 6.2 G/DL (ref 6.4–8.2)
PROT UR STRIP-MCNC: 300 MG/DL
PROTHROMBIN TIME: 14.1 SEC (ref 11.5–15.2)
Q-T INTERVAL, ECG07: 492 MS
QRS DURATION, ECG06: 130 MS
QTC CALCULATION (BEZET), ECG08: 453 MS
RBC # BLD AUTO: 4.45 M/UL (ref 4.2–5.3)
RBC #/AREA URNS HPF: ABNORMAL /HPF (ref 0–5)
SODIUM SERPL-SCNC: 142 MMOL/L (ref 136–145)
SP GR UR REFRACTOMETRY: 1.02 (ref 1–1.03)
THROMBIN TIME: 29.2 SECS (ref 13.8–18.2)
UROBILINOGEN UR QL STRIP.AUTO: 0.2 EU/DL (ref 0.2–1)
VENTRICULAR RATE, ECG03: 51 BPM
WBC # BLD AUTO: 5.4 K/UL (ref 4.6–13.2)
WBC URNS QL MICRO: ABNORMAL /HPF (ref 0–4)

## 2018-03-22 PROCEDURE — 71045 X-RAY EXAM CHEST 1 VIEW: CPT

## 2018-03-22 PROCEDURE — 81001 URINALYSIS AUTO W/SCOPE: CPT | Performed by: EMERGENCY MEDICINE

## 2018-03-22 PROCEDURE — 85670 THROMBIN TIME PLASMA: CPT | Performed by: EMERGENCY MEDICINE

## 2018-03-22 PROCEDURE — 70450 CT HEAD/BRAIN W/O DYE: CPT

## 2018-03-22 PROCEDURE — 80307 DRUG TEST PRSMV CHEM ANLYZR: CPT | Performed by: EMERGENCY MEDICINE

## 2018-03-22 PROCEDURE — 80053 COMPREHEN METABOLIC PANEL: CPT | Performed by: EMERGENCY MEDICINE

## 2018-03-22 PROCEDURE — 94762 N-INVAS EAR/PLS OXIMTRY CONT: CPT

## 2018-03-22 PROCEDURE — 99285 EMERGENCY DEPT VISIT HI MDM: CPT

## 2018-03-22 PROCEDURE — 85610 PROTHROMBIN TIME: CPT | Performed by: EMERGENCY MEDICINE

## 2018-03-22 PROCEDURE — 85384 FIBRINOGEN ACTIVITY: CPT | Performed by: EMERGENCY MEDICINE

## 2018-03-22 PROCEDURE — 85027 COMPLETE CBC AUTOMATED: CPT | Performed by: EMERGENCY MEDICINE

## 2018-03-22 RX ORDER — CLOPIDOGREL BISULFATE 75 MG/1
75 TABLET ORAL DAILY
Qty: 30 TAB | Refills: 2 | Status: SHIPPED | OUTPATIENT
Start: 2018-03-22 | End: 2018-04-25 | Stop reason: SDUPTHER

## 2018-03-22 NOTE — ED TRIAGE NOTES
Pt states she was admitted for a stroke a few days ago. States she has residual L arm numbness and weakness as a result of that stroke, tonight around 2:30am started to feel the numbness spread down her back on the L side. No other deficits noted.

## 2018-03-22 NOTE — DISCHARGE INSTRUCTIONS
PLAN:    1. Return if worse. 2. Follow up outpatient with your primary doctor in 1-2 days. 3. Continue current medications. Add plavix 75 mg Qday for 3 months. Learning About Antiplatelet Medicines After a Stroke  Introduction    If you have had a stroke, you may have concerns about having another one. You want to do all you can do to avoid this. If your stroke was caused by a blood clot, one of the best things you can do is to take antiplatelet medicines. They can help prevent another stroke. In most cases, you don't take them if you had a stroke caused by a leak in an artery. These medicines are often called blood thinners. But they don't thin your blood. They work to keep platelets from sticking together and forming blood clots. (A platelet is a type of blood cell.) Blood clots can cause a stroke if they block a blood vessel in the brain. So by preventing blood clots, you are helping to prevent a stroke. Examples  · Aspirin (Michael, Bufferin, Ecotrin)  · Aspirin with dipyridamole (Aggrenox)  · Clopidogrel (Plavix)  Possible side effects  These medicines make your blood take longer than normal to clot. This can cause bleeding, and you may bruise easily. In rare cases, they can cause you to bleed inside your body without an injury. If you have an injury, you might have bleeding that is hard to control. These medicines may have other side effects. Depending on which one you take, you may:  · Have diarrhea. · Feel sick to your stomach. · Have a headache. · Have some mild belly pain. You may have other side effects or reactions not listed here. Check the information that comes with your medicine. What to know about taking this medicine  · Be sure you get instructions about how to take your medicine safely. Blood thinners can cause serious bleeding problems. · Be safe with medicines. Take your medicines exactly as prescribed.  Call your doctor if you think you are having a problem with your medicine. · Check with your doctor or pharmacist before you use any other medicines, including over-the-counter medicines. Make sure your doctor knows all of the medicines, vitamins, herbal products, and supplements you take. Taking some medicines together can cause problems. Where can you learn more? Go to http://maureen-satish.info/. Enter Y631 in the search box to learn more about \"Learning About Antiplatelet Medicines After a Stroke. \"  Current as of: September 21, 2016  Content Version: 11.4  © 2555-7641 [employer]. Care instructions adapted under license by [employer] (which disclaims liability or warranty for this information). If you have questions about a medical condition or this instruction, always ask your healthcare professional. [address] any warranty or liability for your use of this information. Learning About Medicines That Lower Your Risk of Another Stroke  Introduction  After you have a stroke, going home may be hard, both for you and for your loved ones. There is a lot to think about. Remember to take one day at a time. An important part of your treatment will be to prevent another stroke. And a big part of that is taking medicines. Some of the medicines your doctor may have you take include:  · Aspirin or other blood thinners. They help prevent blood clots. Most strokes are caused by blood clots. · Statins to lower cholesterol. High cholesterol raises your stroke risk. · Medicines for high blood pressure or diabetes. These conditions raise your stroke risk. All medicines can cause side effects. So it is important to understand the pros and cons of any medicine you take. It is also important to take your medicines exactly as your doctor tells you to. Be sure to tell your doctor if you take any other prescription medicine, over-the-counter medicine, vitamins, supplements, or herbal remedies.   Have a pill plan  You may have several pills to take every day. Having a plan for how you will remember to take them may help ease your fears and stress. To make a pill plan, write a list of your medicines. Then write down the details for each one. Include when you started using each medicine, when you take it, how much you take each time (number of pills and milligrams in each pill), and any side effects. Before you leave the hospital, be sure to ask questions if you don't understand or need help with your pill plan. And be sure you know who to call when you have questions at home. Blood thinners  One of the best things you can do to prevent another stroke is to take a medicine called a blood thinner. These medicines don't really thin your blood. They work by helping to prevent blood clots. Blood clots can cause a stroke if they block a blood vessel in the brain. So when you prevent blood clots, you help prevent a stroke. Antiplatelets are a type of blood thinner. They help keep platelets from sticking together and forming blood clots. (A platelet is a type of blood cell.)  Examples of antiplatelets include:  · Aspirin (Michael, Bufferin, Ecotrin). · Aspirin combined with dipyridamole (Aggrenox). · Clopidogrel (Plavix). Another type of blood thinner, called an anticoagulant, may be used if you also have atrial fibrillation. This is a heart rhythm problem. It raises your risk of having a stroke. Be sure to learn how to take your medicine safely. Blood thinners can cause serious bleeding problems. Statins  Statins lower the amount of cholesterol in your blood. If you have too much cholesterol, it starts to build up in blood vessels. And that's how most heart and blood flow problems, including strokes, start. Statins also reduce inflammation around the cholesterol buildup. This may lower the risk that the buildup will break apart and cause a blood clot that can lead to a stroke.   Examples of statins include:  · Atorvastatin (Lipitor). · Lovastatin (Mevacor). · Pravastatin (Pravachol). · Simvastatin (Zocor). Blood pressure medicines  If you have high blood pressure, you may take medicines to lower it. High blood pressure damages blood vessels. Damaged vessels clog up more easily. And that can cause a stroke. If you were taking blood pressure pills before, your doctor may have you keep taking them. Or your doctor may have you take a different type. Blood pressure medicines may include:  · ACE (angiotensin-converting enzyme) inhibitors. · Angiotensin II receptor blockers (ARBs). · Beta-blockers. · Diuretics (water pills). · Calcium channel blockers. Follow-up care is a key part of your treatment and safety. Be sure to make and go to all appointments, and call your doctor if you are having problems. It's also a good idea to know your test results and keep a list of the medicines you take. Where can you learn more? Go to http://maureen-satish.info/. Enter X802 in the search box to learn more about \"Learning About Medicines That Lower Your Risk of Another Stroke. \"  Current as of: March 20, 2017  Content Version: 11.4  © 2452-1178 [employer]. Care instructions adapted under license by [employer] (which disclaims liability or warranty for this information). If you have questions about a medical condition or this instruction, always ask your healthcare professional. [place] [address] any warranty or liability for your use of this information. Learning About an Ischemic Stroke  What is an ischemic stroke? An ischemic (say \"[id number]-EYG-[id number]\") stroke occurs when a blood clot blocks a blood vessel in the brain. This means that blood cannot flow to some part of the brain. Without blood and the oxygen it carries, this part of the brain starts to die. The part of the body controlled by the damaged area of the brain cannot work properly.   This is different from a hemorrhagic (say \"ijf-hum-PN-shavon\") stroke, which happens when a blood vessel in the brain has burst open or has started to leak. The brain damage from a stroke starts within minutes. Quick treatment can help limit damage to the brain and make recovery more likely. People who have had a stroke may have a hard time talking, understanding things, and making decisions. They may have to relearn daily activities, such as how to eat, bathe, and dress. How well someone recovers from a stroke depends on how quickly the person gets to the hospital, where in the brain the stroke happened, and how severe it was. Training and therapy also make a difference in how well people recover. What are the symptoms? If you have any of these symptoms, call 911 or other emergency services right away:  · You have symptoms of a stroke. These may include:  ¨ Sudden numbness, tingling, weakness, or loss of movement in your face, arm, or leg, especially on only one side of your body. ¨ Sudden vision changes. ¨ Sudden trouble speaking. ¨ Sudden confusion or trouble understanding simple statements. ¨ Sudden problems with walking or balance. ¨ A sudden, severe headache that is different from past headaches. See your doctor if you have symptoms that seem like a stroke, even if they go away quickly. You may have had a transient ischemic attack (TIA), sometimes called a mini-stroke. A TIA is a warning that a stroke may happen soon. Getting early treatment for a TIA can help prevent a stroke. What causes an ischemic stroke? An ischemic stroke is caused by a blood clot that blocks blood flow in the brain. The most common causes of blood clots include:  · Hardening of the arteries (atherosclerosis). This is caused by high blood pressure, diabetes, high cholesterol, or smoking. · Atrial fibrillation. How is ischemic stroke treated? You may have to take several medicines, depending on what caused your stroke.   Ask your doctor if a stroke rehab program is right for you. Rehab increases your chances of getting back some of the abilities you lost.  How can you prevent another stroke? · Work with your doctor to treat any health problems you have. High blood pressure, high cholesterol, atrial fibrillation, and diabetes all raise your chances of having a stroke. · Be safe with medicines. Take your medicine exactly as prescribed. Call your doctor if you think you are having a problem with your medicine. · Have a healthy lifestyle. ¨ Do not smoke or allow others to smoke around you. If you need help quitting, talk to your doctor about stop-smoking programs and medicines. These can increase your chances of quitting for good. Smoking makes a stroke more likely. ¨ Limit alcohol to 2 drinks a day for men and 1 drink a day for women. ¨ Lose weight if you need to. A healthy weight will help you keep your heart and body healthy. ¨ Be active. Ask your doctor what type and level of activity is safe for you. ¨ Eat heart-healthy foods, like fruits, vegetables, and high-fiber foods. Follow-up care is a key part of your treatment and safety. Be sure to make and go to all appointments, and call your doctor if you are having problems. It's also a good idea to know your test results and keep a list of the medicines you take. Where can you learn more? Go to http://maureen-satish.info/. Enter D161 in the search box to learn more about \"Learning About an Ischemic Stroke. \"  Current as of: March 20, 2017  Content Version: 11.4  © 1334-1657 [employer]. Care instructions adapted under license by [employer] (which disclaims liability or warranty for this information). If you have questions about a medical condition or this instruction, always ask your healthcare professional. [place] [address] any warranty or liability for your use of this information.   MyChart Activation    Thank you for requesting access to [facility]. Please follow the instructions below to securely access and download your online medical record. [facility] allows you to send messages to your doctor, view your test results, renew your prescriptions, schedule appointments, and more. How Do I Sign Up? 1. In your internet browser, go to [email or web address]. 2. Click on the First Time User? Click Here link in the Sign In box. You will see the New Member Sign Up page. 3. Enter your People[employer] Access Code exactly as it appears below. You will not need to use this code after youve completed the sign-up process. If you do not sign up before the expiration date, you must request a new code. People[employer] Access Code: [email or web address]  Expires: 2018 12:49 PM (This is the date your [employer] access code will )    4. Enter the last four digits of your Social Security Number (xxxx) and Date of Birth (mm/dd/yyyy) as indicated and click Submit. You will be taken to the next sign-up page. 5. Create a PeopleMatter ID. This will be your People[employer] login ID and cannot be changed, so think of one that is secure and easy to remember. 6. Create a [employer] password. You can change your password at any time. 7. Enter your Password Reset Question and Answer. This can be used at a later time if you forget your password. 8. Enter your e-mail address. You will receive e-mail notification when new information is available in [address] 9. Click Sign Up. You can now view and download portions of your medical record. 10. Click the Download Summary menu link to download a portable copy of your medical information. Additional Information    If you have questions, please visit the Frequently Asked Questions section of the People[employer]atter website at [email or web address] Remember, [employer] is NOT to be used for urgent needs. For medical emergencies, dial 911. I have reviewed discharge instructions with the patient.   The patient verbalized understanding.

## 2018-03-22 NOTE — ED PROVIDER NOTES
[facility] EMERGENCY DEPT      4:03 AM    Date: 3/22/2018  Patient Name: Damián Vo    History of Presenting Illness     Chief Complaint   Patient presents with    Numbness       History Provided By: Patient    Chief Complaint: LT numbness   Duration:  Hours  Timing:  Acute  Location: left side  Quality: numbing sensation  Severity: N/A  Modifying Factors: none  Associated Symptoms: denies any other associated signs or symptoms    4:07 AM Damián Vo is a 81 y.o. female with h/o DM, HTN, dyslipidemia, CAD, COPD, MI, and TIA who presents to ED complaining of left sided numbness in arm, leg, and left side of her back that suddenly started this morning around 0230 am. Pt states she recently had a TIA on 3/19/18; she was seen in this ED; she still hs some residual left sided weakness and numbness from the stroke that has not improved. But she says the numbing sensation now is worse tonight and has spread to the left side of her back. States she was awake when the symptoms started. Before her symptoms she was feeling fine. Denies any other symptoms. No other complaints or concerns in the ED. As the patient is without physical symptoms or complaints of pain, there is no severity of pain, quality of pain, duration, modifying factors, or associated signs and symptoms regarding the pt's presenting complaint. PCP: Zayda Roberts MD      Nursing nurses regarding the HPI and triage nursing notes were reviewed. Prior medical records were reviewed. Current Outpatient Prescriptions   Medication Sig Dispense Refill    insulin aspart U-100 (NOVOLOG FLEXPEN U-100 INSULIN) 100 unit/mL inpn 3 Units by SubCUTAneous route Before breakfast, lunch, and dinner. 1 Pen 0    insulin glargine (LANTUS SOLOSTAR U-100 INSULIN) 100 unit/mL (3 mL) inpn 12 Units by SubCUTAneous route daily. 3 Pen 0    aspirin (ASPIRIN) 325 mg tablet Take 1 Tab by mouth daily.  30 Tab 0    atorvastatin (LIPITOR) 80 mg tablet Take 1 Tab by mouth nightly. 30 Tab 0    acetaminophen (TYLENOL EXTRA STRENGTH) 500 mg tablet Take 2 Tabs by mouth every six (6) hours as needed for Pain. 40 Tab 0    Lancets misc Use tid 1 Package 11    glucose blood VI test strips (ASCENSIA AUTODISC VI, ONE TOUCH ULTRA TEST VI) strip by Does Not Apply route See Admin Instructions. Use tid 1 Package 11    Blood-Glucose Meter monitoring kit Use tid 1 kit 0    Insulin Needles, Disposable, (LITE TOUCH INSULIN PEN NEEDLES) 31 X 1/4 \" ndle 1 each by Does Not Apply route five (5) times daily. 100 each 3    glucose 4 gram chewable tablet Take 4 tablets by mouth as needed (hypoglcemia). 100 tablet 1    gabapentin (NEURONTIN) 300 mg capsule Take 1 capsule by mouth nightly. 30 capsule 1    Insulin Needles, Disposable, 31 X 5/16 \" ndle 1qd 1 Package 11    nitroglycerin (NITROSTAT) 0.4 mg SL tablet 1 Tab by SubLINGual route every five (5) minutes as needed for Chest Pain.  1 Bottle 2    Insulin Needles, Disposable, 31 X 5/16 \" Ndle Use bid 1 Package 11       Past History     Past Medical History:  Past Medical History:   Diagnosis Date    Arthritis     Bilateral cataracts     sees Dr. Neva Marquez    COPD     Coronary artery disease     Cx - 2.75 x 33mm XIENCE 10/13     Diabetes     Dyslipidemia     Hypertension     MI (myocardial infarction)     Noncompliance     Obesity     TIA (transient ischemic attack) 3/20/2018       Past Surgical History:  Past Surgical History:   Procedure Laterality Date    HX CORONARY STENT PLACEMENT  2013       Family History:  Family History   Problem Relation Age of Onset    Arthritis-osteo Mother     Heart Disease Mother     Alcohol abuse Neg Hx     Asthma Neg Hx     Bleeding Prob Neg Hx     Cancer Neg Hx     Diabetes Neg Hx     Elevated Lipids Neg Hx     Headache Neg Hx     Hypertension Neg Hx     Lung Disease Neg Hx     Migraines Neg Hx     Psychiatric Disorder Neg Hx     Stroke Neg Hx     Mental Retardation Neg Hx Social History:  Social History   Substance Use Topics    Smoking status: Former Smoker     Packs/day: 1.00     Years: 54.00     Start date: 2/10/2014    Smokeless tobacco: Never Used      Comment: pt states she uses e-cigs to stop smoking    Alcohol use No       Allergies:  No Known Allergies    Patient's primary care provider (as noted in EPIC):  Caroline Norman MD    Review of Systems     Review of Systems   Constitutional: Negative for diaphoresis. HENT: Negative for congestion. Eyes: Negative for discharge. Respiratory: Negative for stridor. Cardiovascular: Negative for palpitations. Gastrointestinal: Negative for diarrhea. Genitourinary: Negative for flank pain. Musculoskeletal: Negative for back pain. Neurological: Positive for numbness. Negative for weakness. Psychiatric/Behavioral: Negative for hallucinations. All other systems reviewed and are negative. Physical Exam       Visit Vitals    /68    Pulse (!) 50    Temp 98 °F (36.7 °C)    Resp 18    SpO2 99%       PHYSICAL EXAM:    CONSTITUTIONAL:  Alert, in no apparent distress;  well developed;  well nourished. HEAD:  Normocephalic, atraumatic. EYES:  EOMI. Non-icteric sclera. Normal conjunctiva. ENTM:  Nose:  no rhinorrhea. Throat:  no erythema or exudate, mucous membranes moist.  NECK:  No JVD. Supple  RESPIRATORY:  Chest clear, equal breath sounds, good air movement. CARDIOVASCULAR:  Regular rate and rhythm. No murmurs, rubs, or gallops. GI:  Normal bowel sounds, abdomen soft and non-tender. No rebound or guarding. BACK:  Non-tender. UPPER EXT:  Normal inspection. LOWER EXT:  No edema, no calf tenderness. Distal pulses intact. NEURO:  Moves all four extremities. Normal motor exam and sensation on right 4+/5 strength of left arm and left leg compared to right. Normal CN II-XII exam.  Normal bilateral finger-to-nose exam.     SKIN:  No rashes;  Normal for age.   PSYCH:  Alert and normal affect. DIFFERENTIAL DIAGNOSES/ MEDICAL DECISION MAKING:  Hypoglycemia, acute alcohol, drug, or multipharmacy intoxication, sepsis from numerous possible sources including urosepsis, pneumonia, meningitis, significant CVA, TIA, intracerebral hemorrhage, subdural hemorrhage, seizure, significant trauma, electrolyte or hormonal imbalance, other etiologies, versus a combination of the above. Diagnostic Study Results     Abnormal lab results from this emergency department encounter:  Labs Reviewed   METABOLIC PANEL, COMPREHENSIVE - Abnormal; Notable for the following:        Result Value    Chloride 109 (*)     Glucose 249 (*)     BUN 20 (*)     GFR est AA 55 (*)     GFR est non-AA 46 (*)     Calcium 7.8 (*)     AST (SGOT) 14 (*)     Protein, total 6.2 (*)     Albumin 2.6 (*)     A-G Ratio 0.7 (*)     All other components within normal limits   THROMBIN TIME - Abnormal; Notable for the following:      Thrombin time 29.2 (*)     All other components within normal limits   FIBRINOGEN - Abnormal; Notable for the following:     Fibrinogen 528 (*)     All other components within normal limits   CBC W/O DIFF   PROTHROMBIN TIME + INR   ASPIRIN TEST   URINALYSIS W/ RFLX MICROSCOPIC   DRUG SCREEN, URINE   POC PT/INR   POC GLUCOSE BEDSIDE   TYPE & SCREEN       Lab values for this patient within approximately the last 12 hours:  Recent Results (from the past 12 hour(s))   CBC W/O DIFF    Collection Time: 03/22/18  3:47 AM   Result Value Ref Range    WBC 5.4 4.6 - 13.2 K/uL    RBC 4.45 4.20 - 5.30 M/uL    HGB 13.3 12.0 - 16.0 g/dL    HCT 38.7 35.0 - 45.0 %    MCV 87.0 74.0 - 97.0 FL    MCH 29.9 24.0 - 34.0 PG    MCHC 34.4 31.0 - 37.0 g/dL    RDW 12.7 11.6 - 14.5 %    PLATELET 384 698 - 339 K/uL    MPV 11.2 9.2 - 46.8 FL   METABOLIC PANEL, COMPREHENSIVE    Collection Time: 03/22/18  3:47 AM   Result Value Ref Range    Sodium 142 136 - 145 mmol/L    Potassium 3.7 3.5 - 5.5 mmol/L    Chloride 109 (H) 100 - 108 mmol/L    CO2 27 21 - 32 mmol/L    Anion gap 6 3.0 - 18 mmol/L    Glucose 249 (H) 74 - 99 mg/dL    BUN 20 (H) 7.0 - 18 MG/DL    Creatinine 1.17 0.6 - 1.3 MG/DL    BUN/Creatinine ratio 17 12 - 20      GFR est AA 55 (L) >60 ml/min/1.73m2    GFR est non-AA 46 (L) >60 ml/min/1.73m2    Calcium 7.8 (L) 8.5 - 10.1 MG/DL    Bilirubin, total 0.2 0.2 - 1.0 MG/DL    ALT (SGPT) 18 13 - 56 U/L    AST (SGOT) 14 (L) 15 - 37 U/L    Alk. phosphatase 99 45 - 117 U/L    Protein, total 6.2 (L) 6.4 - 8.2 g/dL    Albumin 2.6 (L) 3.4 - 5.0 g/dL    Globulin 3.6 2.0 - 4.0 g/dL    A-G Ratio 0.7 (L) 0.8 - 1.7     PROTHROMBIN TIME + INR    Collection Time: 03/22/18  3:47 AM   Result Value Ref Range    Prothrombin time 14.1 11.5 - 15.2 sec    INR 1.1 0.8 - 1.2     THROMBIN TIME    Collection Time: 03/22/18  3:47 AM   Result Value Ref Range    Thrombin time 29.2 (H) 13.8 - 18.2 SECS   FIBRINOGEN    Collection Time: 03/22/18  3:47 AM   Result Value Ref Range    Fibrinogen 528 (H) 210 - 451 mg/dL       Radiologist and cardiologist interpretations if available at time of this note:  Ct Head Wo Cont    Result Date: 3/22/2018  EXAM: CT head INDICATION: Cerebrovascular accident. COMPARISON: MRI brain performed March 20, 2018 and CT of the head performed March 19, 2018. TECHNIQUE: Axial CT imaging of the head was performed without intravenous contrast. Dose reduction techniques used: automated exposure control, adjustment of the mAs and/or kVp according to patient size, and iterative reconstruction techniques. _______________ FINDINGS: BRAIN AND POSTERIOR FOSSA: The sulci, folia, ventricles and basal cisterns are within normal limits for the patient's age. There is no intracranial hemorrhage, mass effect, or midline shift. A small right thalamic hypodensity is noted. There is mild bilateral periventricular and central white matter areas of diminished attenuation most pronounced in the left parietal periventricular white matter.  EXTRA-AXIAL SPACES AND MENINGES: There are no abnormal extra-axial fluid collections. CALVARIUM: Intact. SINUSES: Clear. OTHER: None. _______________     IMPRESSION: Small hypodensity right thalamus consistent with an evolving right thalamic lacunar infarct as seen on recent MRI. Mild bilateral periventricular and central white matter diminished attenuation also unchanged compared to previous. No acute findings. No hemorrhage. Findings were called to referring emergency room physician just  after study completion. .     Interpreted by ED Physician:  Cardiac Monitor Strip interpretation is Sinus Bradycardia about 50 bpm, No ST changes noted, NORMAL WIDTH QRS. 12 lead EKG interpreted by ED Physician is Sinus Bradycardia about 50 bpm, non-specific EKG. Medication(s) ordered for patient during this emergency visit encounter:  Medications - No data to display    Medical Decision Making     I am the first provider for this patient. I reviewed the vital signs, available nursing notes, past medical history, past surgical history, family history and social history. Vital Signs:  Reviewed the patient's vital signs. ED COURSE:      IMPRESSION AND MEDICAL DECISION MAKING:  Based upon the patients presentation with noted HPI and PE, along with the work up done in the emergency department, I believe that the patient is having altered mental status of uncertain etiology. However, given the history of presenting illness as well as the patients risk factor, I believe that the patient should be admitted for further evaluation and work up of the altered mental status. The patient's presentation is consistent with a possible cerebrovascular accident (CVA). The last known date of normal baseline neurological function for this patient was 2.5 hours ago and thus the patient is within the 3 hour window for consideration for tPA therapy. Consult Teleneurology    The on call neurologist was called and the patient was presented for neurology consult.  I personally spoke with Dr. Nova Dillard, in the teleneurology group, about the patient's presentation and management. Recommends from the teleneurologist are:  1. Current presentation is an extension of recent stroke for which patient was recently hospitalized. 2. Continue current medications. Add plavix 75 mg Qday for 3 months. DIAGNOSIS:  1. Cerebrovascular accident. PLAN:    1. Return if worse. 2. Follow up outpatient with your primary doctor in 1-2 days. 3. Continue current medications. Add plavix 75 mg Qday for 3 months. Coding Diagnoses     Clinical Impression:   1. Cerebrovascular accident (CVA), unspecified mechanism ([facility] [address]        Disposition     Disposition:  Home. Carly Miramontes M.D. BRANDO Board Certified Emergency Physician    Provider Attestation:  If a scribe was utilized in generation of this patient record, I personally performed the services described in the documentation, reviewed the documentation, as recorded by the scribe in my presence, and it accurately records the patient's history of presenting illness, review of systems, patient physical examination, and procedures performed by me as the attending physician. Carly Miramontes M.D. BRANDO Board Certified Emergency Physician  3/22/2018.     Madeleine Amanda 128 acting as a scribe for and in the presence of Jacob Rowland MD      March 22, 2018 at 4:06 AM

## 2018-03-23 LAB
ATRIAL RATE: 51 BPM
CALCULATED P AXIS, ECG09: 72 DEGREES
CALCULATED R AXIS, ECG10: -36 DEGREES
CALCULATED T AXIS, ECG11: 21 DEGREES
DIAGNOSIS, 93000: NORMAL
P-R INTERVAL, ECG05: 190 MS
Q-T INTERVAL, ECG07: 492 MS
QRS DURATION, ECG06: 130 MS
QTC CALCULATION (BEZET), ECG08: 453 MS
VENTRICULAR RATE, ECG03: 51 BPM

## 2018-03-29 ENCOUNTER — OFFICE VISIT (OUTPATIENT)
Dept: FAMILY MEDICINE CLINIC | Age: 73
End: 2018-03-29

## 2018-03-29 VITALS
OXYGEN SATURATION: 98 % | HEIGHT: 63 IN | DIASTOLIC BLOOD PRESSURE: 60 MMHG | BODY MASS INDEX: 40.93 KG/M2 | TEMPERATURE: 96.4 F | WEIGHT: 231 LBS | SYSTOLIC BLOOD PRESSURE: 122 MMHG | HEART RATE: 61 BPM | RESPIRATION RATE: 18 BRPM

## 2018-03-29 DIAGNOSIS — E11.65 TYPE 2 DIABETES MELLITUS WITH HYPERGLYCEMIA, WITH LONG-TERM CURRENT USE OF INSULIN (HCC): Primary | ICD-10-CM

## 2018-03-29 DIAGNOSIS — I63.39 CEREBROVASCULAR ACCIDENT (CVA) DUE TO THROMBOSIS OF OTHER CEREBRAL ARTERY (HCC): ICD-10-CM

## 2018-03-29 DIAGNOSIS — Z79.4 TYPE 2 DIABETES MELLITUS WITH HYPERGLYCEMIA, WITH LONG-TERM CURRENT USE OF INSULIN (HCC): Primary | ICD-10-CM

## 2018-03-29 DIAGNOSIS — E66.01 MORBIDLY OBESE (HCC): ICD-10-CM

## 2018-03-29 PROBLEM — E11.40 TYPE 2 DIABETES MELLITUS WITH DIABETIC NEUROPATHY (HCC): Status: ACTIVE | Noted: 2018-03-29

## 2018-03-29 PROBLEM — E11.21 TYPE 2 DIABETES WITH NEPHROPATHY (HCC): Status: ACTIVE | Noted: 2018-03-29

## 2018-03-29 RX ORDER — PEN NEEDLE, DIABETIC 29 G X1/2"
1 NEEDLE, DISPOSABLE MISCELLANEOUS
Qty: 200 PEN NEEDLE | Refills: 6 | Status: SHIPPED | OUTPATIENT
Start: 2018-03-29

## 2018-03-29 NOTE — PROGRESS NOTES
Arley Foster is a 81 y.o.  female and presents with    Chief Complaint   Patient presents with    Extremity Weakness     Subjective:  Ms. Asia Mcqueen presents for f/u and to establish care after she presented to ED 1 week ago complaining of left sided numbness in arm, leg, and left side of her back that suddenly started the morning of her presentation. Pt recently had a TIA on 3/19/18; she was seen in [facility] ED; she still has some residual left sided weakness and numbness from the stroke that has not improved. But she says the numbing sensation now is worse and has spread to the left side of her back. States she was awake when the symptoms started. Before her symptoms she was feeling fine. Hypertension  Patient is here for evaluation of hypertension. Age at onset of elevated blood pressure:  52. She indicates that she is has noted upper extremity weakness. She denies any symptoms referable to her elevated blood pressure. Specifically denies chest pain, palpitations, dyspnea, orthopnea, PND or peripheral edema. Current medication regimen is as listed   below. Patient has these side effects of medication: none. Cardiovascular risk factors: dyslipidemia, diabetes mellitus, obesity, sedentary life style, hypertension, post-menopausal Use of agents associated with hypertension: none History of renal disease: negative  History of flank trauma: negative    Cardiovascular Review:  The patient has diabetes, hypertension, hyperlipidemia and history of prior stroke. Diet and Lifestyle: not attempting to follow a low fat, low cholesterol diet, not attempting to follow a low sodium diet, does not rigorously follow a diabetic diet, sedentary, nonsmoker  Home BP Monitoring: is not measured at home. Pertinent ROS: taking medications as instructed, no medication side effects noted, no TIA's, no chest pain on exertion, no dyspnea on exertion, no swelling of ankles.    Diabetes Mellitus:  She has diabetes mellitus, and hypertension, hyperlipidemia, history of prior stroke and obesity. Diabetic ROS - medication compliance: compliant all of the time, diabetic diet compliance: compliant most of the time, home glucose monitoring: is performed sporadically. Lab review: labs reviewed, I note that glycosylated hemoglobin abnormal high. Patient Active Problem List   Diagnosis Code    Diabetes E11.9    Hypertension I11.9    Dyslipidemia E78.5    Coronary artery disease I25.10    Uncontrolled diabetes mellitus ([facility] [address] E11.65    Chest pain R07.9    Chest pain at rest R07.9    Stroke [facility]) I63.9    Obesity, morbid ([facility] [address] E66.01    Type 2 diabetes with nephropathy ([address] E11.21     Patient Active Problem List    Diagnosis Date Noted    Obesity, morbid ([address] 03/29/2018    Type 2 diabetes with nephropathy ([facility] [address] 03/29/2018    Stroke ([facility] [address] 03/20/2018    Chest pain 12/08/2014    Chest pain at rest 12/08/2014    Uncontrolled diabetes mellitus ([facility] [address] 03/25/2014    Diabetes     Hypertension     Dyslipidemia     Coronary artery disease      Current Outpatient Prescriptions   Medication Sig Dispense Refill    clopidogrel (PLAVIX) 75 mg tab Take 1 Tab by mouth daily. 30 Tab 2    insulin aspart U-100 (NOVOLOG FLEXPEN U-100 INSULIN) 100 unit/mL inpn 3 Units by SubCUTAneous route Before breakfast, lunch, and dinner. 1 Pen 0    insulin glargine (LANTUS SOLOSTAR U-100 INSULIN) 100 unit/mL (3 mL) inpn 12 Units by SubCUTAneous route daily. 3 Pen 0    aspirin (ASPIRIN) 325 mg tablet Take 1 Tab by mouth daily. 30 Tab 0    atorvastatin (LIPITOR) 80 mg tablet Take 1 Tab by mouth nightly. 30 Tab 0    Lancets misc Use tid 1 Package 11    glucose blood VI test strips (ASCENSIA AUTODISC VI, ONE TOUCH ULTRA TEST VI) strip by Does Not Apply route See Admin Instructions.  Use tid 1 Package 11    Blood-Glucose Meter monitoring kit Use tid 1 kit 0    Insulin Needles, Disposable, (LITE TOUCH INSULIN PEN NEEDLES) 31 X 1/4 \" ndle 1 each by Does Not Apply route five (5) times daily. 100 each 3    Insulin Needles, Disposable, 31 X 5/16 \" ndle 1qd 1 Package 11    Insulin Needles, Disposable, 31 X 5/16 \" Ndle Use bid 1 Package 11    acetaminophen (TYLENOL EXTRA STRENGTH) 500 mg tablet Take 2 Tabs by mouth every six (6) hours as needed for Pain. 40 Tab 0    glucose 4 gram chewable tablet Take 4 tablets by mouth as needed (hypoglcemia). 100 tablet 1    gabapentin (NEURONTIN) 300 mg capsule Take 1 capsule by mouth nightly. 30 capsule 1    nitroglycerin (NITROSTAT) 0.4 mg SL tablet 1 Tab by SubLINGual route every five (5) minutes as needed for Chest Pain. 1 Bottle 2     No Known Allergies  Past Medical History:   Diagnosis Date    Arthritis     Bilateral cataracts     sees Dr. Tanya Berman    COPD     Coronary artery disease     Cx - 2.75 x 33mm XIENCE 10/13     Diabetes     Dyslipidemia     Hypertension     MI (myocardial infarction)     Noncompliance     Obesity     TIA (transient ischemic attack) 3/20/2018     Past Surgical History:   Procedure Laterality Date    HX CORONARY STENT PLACEMENT  2013     Family History   Problem Relation Age of Onset    Arthritis-osteo Mother     Heart Disease Mother     Alcohol abuse Neg Hx     Asthma Neg Hx     Bleeding Prob Neg Hx     Cancer Neg Hx     Diabetes Neg Hx     Elevated Lipids Neg Hx     Headache Neg Hx     Hypertension Neg Hx     Lung Disease Neg Hx     Migraines Neg Hx     Psychiatric Disorder Neg Hx     Stroke Neg Hx     Mental Retardation Neg Hx      Social History   Substance Use Topics    Smoking status: Former Smoker     Packs/day: 1.00     Years: 54.00     Start date: 2/10/2014    Smokeless tobacco: Never Used      Comment: pt states she uses e-cigs to stop smoking    Alcohol use No       ROS   Constitutional: Negative for diaphoresis. HENT: Negative for congestion. Eyes: Negative for discharge. Respiratory: Negative for stridor. Cardiovascular: Negative for palpitations. Gastrointestinal: Negative for diarrhea. Genitourinary: Negative for flank pain. Musculoskeletal: Negative for back pain. Neurological: Positive for numbness. Negative for weakness. Psychiatric/Behavioral: Negative for hallucinations. All other systems reviewed and are negative. Objective:  Vitals:    03/29/18 1314   BP: 122/60   Pulse: 61   Resp: 18   Temp: 96.4 °F (35.8 °C)   TempSrc: Oral   SpO2: 98%   Weight: 231 lb (104.8 kg)   Height: 5' 3\" (1.6 m)   PainSc:   0 - No pain     CONSTITUTIONAL:  Alert, in no apparent distress;  well developed;  well nourished. HEAD:  Normocephalic, atraumatic. EYES:  EOMI. Non-icteric sclera. Normal conjunctiva. ENTM:  Nose:  no rhinorrhea. Throat:  no erythema or exudate, mucous membranes moist.  NECK:  No JVD. Supple  RESPIRATORY:  Chest clear, equal breath sounds, good air movement. CARDIOVASCULAR:  Regular rate and rhythm. No murmurs, rubs, or gallops. GI:  Normal bowel sounds, abdomen soft and non-tender. No rebound or guarding. BACK:  Non-tender. UPPER EXT:  Normal inspection. LOWER EXT:  No edema, no calf tenderness. Distal pulses intact.     NEURO:  Moves all four extremities. Normal motor exam and sensation on right 4+/5 strength of left arm and left leg compared to right. Normal CN II-XII exam.  Normal bilateral finger-to-nose exam.      SKIN:  No rashes;  Normal for age. PSYCH:  Alert and normal affect. TESTS  CT head  IMPRESSION:     Small hypodensity right thalamus consistent with an evolving right thalamic  lacunar infarct as seen on recent MRI.     Mild bilateral periventricular and central white matter diminished attenuation  also unchanged compared to previous.     No acute findings.     No hemorrhage. Assessment/Plan:    1.  Type 2 diabetes mellitus with hyperglycemia, with long-term current use of insulin ([facility])  Goal hgb a1c <7; follow medication directions better for goal glucose 100-140; encourage healthy diet and exercise  - glucose blood VI test strips (ASCENSIA AUTODISC VI, ONE TOUCH ULTRA TEST VI) strip; by Does Not Apply route See Admin Instructions. Use tid  Dispense: 200 Strip; Refill: 6  - Insulin Needles, Disposable, (LITE TOUCH INSULIN PEN NEEDLES) 31 gauge x 1/4\" ndle; 1 Each by Does Not Apply route five (5) times daily. Dispense: 200 Pen Needle; Refill: 6    2. Cerebrovascular accident (CVA) due to thrombosis of right carotid artery (HCC)  Aspirin, antihypertensive medication, statin to prevent further events    3. Morbidly obese ([facility] [address]  I have reviewed/discussed the above normal BMI with the patient. I have recommended the following interventions: dietary management education, guidance, and counseling and encourage exercise . Fernando Azul Lab review: labs reviewed, I note that lipids LDL result does not yet meet goal, HDL normal, triglycerides high, electrolytes abnormal, hemogram normal, TSH normal      I have discussed the diagnosis with the patient and the intended plan as seen in the above orders. The patient has received an after-visit summary and questions were answered concerning future plans. I have discussed medication side effects and warnings with the patient as well. I have reviewed the plan of care with the patient, accepted their input and they are in agreement with the treatment goals. Follow-up Disposition:  Return in about 1 month (around 4/29/2018) for medication evaluation.

## 2018-03-29 NOTE — PROGRESS NOTES
Ron Bonilla is a 81 y.o. female  Chief Complaint   [facility]   [facility]     1. Have you been to the ER, urgent care clinic since your last visit? Hospitalized since your last visit? Yes Reason for visit: 3/19/2018, [facility]    2. Have you seen or consulted any other health care providers outside of the [address] [place] [address] since your last visit? Include any pap smears or colon screening.  No

## 2018-03-29 NOTE — MR AVS SNAPSHOT
[address] [place] [id number] 
[phone] Patient: Arley Foster MRN: [id number] Visit Information Date & Time Provider Department Dept. Phone Encounter #  
 3/29/2018 12:45 PM Daniel Ortiz, [address] [phone] Follow-up Instructions Return in about 1 month (around 4/29/2018) for medication evaluation. Upcoming Health Maintenance Date Due Hepatitis C Screening 1945 FOOT EXAM Q1 9/23/1957 DTaP/Tdap/Td series (1 - Tdap) 9/23/1968 BREAST CANCER SCRN MAMMOGRAM 9/23/1997 FOBT Q 1 YEAR AGE 50-75 9/23/1997 ZOSTER VACCINE AGE 60> 7/23/2007 Bone Densitometry (Dexa) Screening 9/23/2012 EYE EXAM RETINAL OR DILATED Q1 11/17/2015 MICROALBUMIN Q1 12/1/2015 Pneumococcal 65+ Low/Medium Risk (2 of 2 - PCV13) 12/9/2015 GLAUCOMA SCREENING Q2Y 11/17/2016 Influenza Age 5 to Adult 8/1/2017 MEDICARE YEARLY EXAM 3/20/2018 HEMOGLOBIN A1C Q6M 9/20/2018 LIPID PANEL Q1 3/20/2019 Allergies as of 3/29/2018  Review Complete On: 3/29/2018 By: Daniel Ortiz MD  
 No Known Allergies Current Immunizations  Reviewed on 11/3/2014 Name Date Influenza Vaccine 11/3/2014  1:15 PM  
 Influenza Vaccine PF 10/23/2013 10:25 AM  
 Influenza Vaccine Split 10/16/2012 Pneumococcal Polysaccharide (PPSV-23) 12/9/2014  3:02 PM  
  
 Not reviewed this visit You Were Diagnosed With   
  
 Codes Comments Type 2 diabetes mellitus with hyperglycemia, with long-term current use of insulin (HCC)    -  Primary ICD-10-CM: E11.65, Z79.4 ICD-9-CM: 250.00, 790.29, V58.67 Cerebrovascular accident (CVA) due to thrombosis of right carotid artery ([facility] [address]     ICD-10-CM: D93.746 
ICD-9-CM: 433.11 Morbidly obese (HCC)     ICD-10-CM: E66.01 
ICD-9-CM: 278.01 Vitals BP Pulse Temp Resp Height(growth percentile) Weight(growth percentile) 122/60 (BP 1 Location: Right arm, BP Patient Position: Sitting) 61 96.4 °F (35.8 °C) (Oral) 18 5' 3\" (1.6 m) 231 lb (104.8 kg) SpO2 BMI OB Status Smoking Status 98% 40.92 kg/m2 Postmenopausal Former Smoker Vitals History BMI and BSA Data Body Mass Index Body Surface Area 40.92 kg/m 2 2.16 m 2 Preferred Pharmacy Pharmacy Name Phone Cal [facility] [address] [place], [place] [id number] [facility] [address] [place] [address] [phone] Your Updated Medication List  
  
   
This list is accurate as of 3/29/18  1:59 PM.  Always use your most recent med list.  
  
  
  
  
 acetaminophen 500 mg tablet Commonly known as:  80 Jr Pierre Mathew Se Take 2 Tabs by mouth every six (6) hours as needed for Pain. aspirin 325 mg tablet Commonly known as:  ASPIRIN Take 1 Tab by mouth daily. atorvastatin 80 mg tablet Commonly known as:  LIPITOR Take 1 Tab by mouth nightly. Blood-Glucose Meter monitoring kit Use tid  
  
 clopidogrel 75 mg Tab Commonly known as:  PLAVIX Take 1 Tab by mouth daily. gabapentin 300 mg capsule Commonly known as:  NEURONTIN Take 1 capsule by mouth nightly. glucose 4 gram chewable tablet Take 4 tablets by mouth as needed (hypoglcemia). glucose blood VI test strips strip Commonly known as:  ASCENSIA AUTODISC VI, ONE TOUCH ULTRA TEST VI  
by Does Not Apply route See Admin Instructions. Use tid  
  
 insulin aspart U-100 100 unit/mL Inpn Commonly known as:  NovoLOG Flexpen U-100 Insulin 3 Units by SubCUTAneous route Before breakfast, lunch, and dinner. insulin glargine 100 unit/mL (3 mL) Inpn Commonly known as:  LANTUS SOLOSTAR U-100 INSULIN  
12 Units by SubCUTAneous route daily. Insulin Needles (Disposable) 31 gauge x 1/4\" Ndle Commonly known as:  LITE TOUCH INSULIN PEN NEEDLES  
1 Each by Does Not Apply route five (5) times daily. Lancets Misc Use tid  
  
 nitroglycerin 0.4 mg SL tablet Commonly known as:  NITROSTAT  
1 Tab by SubLINGual route every five (5) minutes as needed for Chest Pain. Prescriptions Sent to Pharmacy Refills  
 glucose blood VI test strips (ASCENSIA AUTODISC VI, ONE TOUCH ULTRA TEST VI) strip 6 Sig: by Does Not Apply route See Admin Instructions. Use tid Class: Normal  
 Pharmacy: [address] [place] [address], [address] [place] [address] Ph #: [phone] Route: Does Not Apply Insulin Needles, Disposable, (LITE TOUCH INSULIN PEN NEEDLES) 31 gauge x 1/4\" ndle 6 Si Each by Does Not Apply route five (5) times daily. Class: Normal  
 Pharmacy: [address] [place] [address], [address] [place] [address] Ph #: [phone] Route: Does Not Apply Follow-up Instructions Return in about 1 month (around 2018) for medication evaluation. Introducing [facility]! Jalil Oakes introduces [employer] patient portal. Now you can access parts of your medical record, email your doctor's office, and request medication refills online. 1. In your internet browser, go to [email or web address] 2. Click on the First Time User? Click Here link in the Sign In box. You will see the New Member Sign Up page. 3. Enter your [employer] Access Code exactly as it appears below. You will not need to use this code after youve completed the sign-up process. If you do not sign up before the expiration date, you must request a new code. · [employer] Access Code: [email or web address] Expires: 2018 12:49 PM 
 
4. Enter the last four digits of your Social Security Number (xxxx) and Date of Birth (mm/dd/yyyy) as indicated and click Submit. You will be taken to the next sign-up page. 5. Create a [email or web address] ID. This will be your [employer] login ID and cannot be changed, so think of one that is secure and easy to remember. 6. Create a [email or web address] password. You can change your password at any time. 7. Enter your Password Reset Question and Answer. This can be used at a later time if you forget your password. 8. Enter your e-mail address. You will receive e-mail notification when new information is available in [address] 9. Click Sign Up. You can now view and download portions of your medical record. 10. Click the Download Summary menu link to download a portable copy of your medical information. If you have questions, please visit the Frequently Asked Questions section of the [employer] website. Remember, [employer] is NOT to be used for urgent needs. For medical emergencies, dial 911. Now available from your iPhone and Android! Please provide this summary of care documentation to your next provider. Your primary care clinician is listed as May Morales. If you have any questions after today's visit, please call [phone].

## 2018-04-03 RX ORDER — ATORVASTATIN CALCIUM 80 MG/1
80 TABLET, FILM COATED ORAL
Qty: 30 TAB | Refills: 0 | Status: CANCELLED | OUTPATIENT
Start: 2018-04-03

## 2018-04-03 RX ORDER — INSULIN ASPART 100 [IU]/ML
3 INJECTION, SOLUTION INTRAVENOUS; SUBCUTANEOUS
Qty: 1 PEN | Refills: 0 | Status: CANCELLED | OUTPATIENT
Start: 2018-04-03

## 2018-04-03 RX ORDER — INSULIN GLARGINE 100 [IU]/ML
12 INJECTION, SOLUTION SUBCUTANEOUS DAILY
Qty: 3 PEN | Refills: 0 | Status: CANCELLED | OUTPATIENT
Start: 2018-04-03

## 2018-04-03 NOTE — TELEPHONE ENCOUNTER
Please see medication refill request. Patient needed to change pharmacies. Pharmacy has been updated in chart. Please advise.  Thank you

## 2018-04-03 NOTE — TELEPHONE ENCOUNTER
Please see medication refill request. Patient changing pharmacies. Pharmacy has been updated. Please advise.  Thank you

## 2018-04-04 RX ORDER — ATORVASTATIN CALCIUM 80 MG/1
80 TABLET, FILM COATED ORAL
Qty: 30 TAB | Refills: 0 | Status: SHIPPED | OUTPATIENT
Start: 2018-04-04 | End: 2018-06-04 | Stop reason: SDUPTHER

## 2018-04-04 RX ORDER — INSULIN GLARGINE 100 [IU]/ML
12 INJECTION, SOLUTION SUBCUTANEOUS DAILY
Qty: 3 PEN | Refills: 0 | Status: SHIPPED | OUTPATIENT
Start: 2018-04-04 | End: 2018-07-23 | Stop reason: SDUPTHER

## 2018-04-04 RX ORDER — INSULIN ASPART 100 [IU]/ML
3 INJECTION, SOLUTION INTRAVENOUS; SUBCUTANEOUS
Qty: 1 PEN | Refills: 0 | Status: SHIPPED | OUTPATIENT
Start: 2018-04-04 | End: 2018-07-23 | Stop reason: SDUPTHER

## 2018-04-16 ENCOUNTER — DOCUMENTATION ONLY (OUTPATIENT)
Dept: FAMILY MEDICINE CLINIC | Age: 73
End: 2018-04-16

## 2018-04-16 NOTE — PROGRESS NOTES
DOCUMENTATION ONLY:  [facility] prescription order was signed and completed for the patient diabetes strips and faxed to [phone] on 04/13/2018. Fax confirmation was received and sent to central scanning.

## 2018-04-18 ENCOUNTER — TELEPHONE (OUTPATIENT)
Dept: FAMILY MEDICINE CLINIC | Age: 73
End: 2018-04-18

## 2018-04-18 NOTE — TELEPHONE ENCOUNTER
Pt. Called in stating that Dr. Suzy Case was supposed to send in an order for glucose monitor and test strips [facility] He sent a prescription for test strips on 03/29/18 but she said she never received anything. Asking to be called back. Please advise.

## 2018-04-18 NOTE — TELEPHONE ENCOUNTER
Patient states she was told you would send over a talking glucose meter for her please advise she currently dont have a monitoring device

## 2018-04-19 DIAGNOSIS — E11.65 TYPE 2 DIABETES MELLITUS WITH HYPERGLYCEMIA, WITH LONG-TERM CURRENT USE OF INSULIN (HCC): Primary | ICD-10-CM

## 2018-04-19 DIAGNOSIS — Z79.4 TYPE 2 DIABETES MELLITUS WITH HYPERGLYCEMIA, WITH LONG-TERM CURRENT USE OF INSULIN (HCC): Primary | ICD-10-CM

## 2018-04-19 RX ORDER — INSULIN PUMP SYRINGE, 3 ML
EACH MISCELLANEOUS
Qty: 1 KIT | Refills: 0 | Status: SHIPPED | OUTPATIENT
Start: 2018-04-19

## 2018-04-19 NOTE — TELEPHONE ENCOUNTER
I ordered the glucometer. We may need to call [employer] to ask which glucometer and test strips they provide through their pharmacy.

## 2018-04-25 RX ORDER — CLOPIDOGREL BISULFATE 75 MG/1
75 TABLET ORAL DAILY
Qty: 30 TAB | Refills: 2 | Status: SHIPPED | OUTPATIENT
Start: 2018-04-25 | End: 2018-06-04 | Stop reason: SDUPTHER

## 2018-04-25 NOTE — TELEPHONE ENCOUNTER
Please see medication refill request. Prescription was not prescribed by Lili[facility]. Please advise.  Thank you

## 2018-05-04 ENCOUNTER — TELEPHONE (OUTPATIENT)
Dept: FAMILY MEDICINE CLINIC | Age: 73
End: 2018-05-04

## 2018-05-07 DIAGNOSIS — Z79.4 TYPE 2 DIABETES MELLITUS WITH HYPERGLYCEMIA, WITH LONG-TERM CURRENT USE OF INSULIN (HCC): ICD-10-CM

## 2018-05-07 DIAGNOSIS — E11.65 TYPE 2 DIABETES MELLITUS WITH HYPERGLYCEMIA, WITH LONG-TERM CURRENT USE OF INSULIN (HCC): ICD-10-CM

## 2018-05-08 RX ORDER — LANCETS
EACH MISCELLANEOUS
Qty: 1 PACKAGE | Refills: 11 | Status: SHIPPED | OUTPATIENT
Start: 2018-05-08

## 2018-06-04 ENCOUNTER — OFFICE VISIT (OUTPATIENT)
Dept: FAMILY MEDICINE CLINIC | Age: 73
End: 2018-06-04

## 2018-06-04 VITALS
BODY MASS INDEX: 41.46 KG/M2 | TEMPERATURE: 97.5 F | OXYGEN SATURATION: 96 % | RESPIRATION RATE: 18 BRPM | HEIGHT: 63 IN | WEIGHT: 234 LBS | HEART RATE: 58 BPM | SYSTOLIC BLOOD PRESSURE: 122 MMHG | DIASTOLIC BLOOD PRESSURE: 60 MMHG

## 2018-06-04 DIAGNOSIS — Z71.89 ADVANCE CARE PLANNING: Primary | ICD-10-CM

## 2018-06-04 DIAGNOSIS — E66.01 OBESITY, MORBID (HCC): ICD-10-CM

## 2018-06-04 DIAGNOSIS — E11.21 TYPE 2 DIABETES WITH NEPHROPATHY (HCC): ICD-10-CM

## 2018-06-04 DIAGNOSIS — E13.311 MACULAR EDEMA DUE TO SECONDARY DIABETES (HCC): ICD-10-CM

## 2018-06-04 DIAGNOSIS — I25.119 ATHEROSCLEROSIS OF NATIVE CORONARY ARTERY WITH ANGINA PECTORIS, UNSPECIFIED WHETHER NATIVE OR TRANSPLANTED HEART (HCC): ICD-10-CM

## 2018-06-04 DIAGNOSIS — Z00.00 MEDICARE ANNUAL WELLNESS VISIT, INITIAL: ICD-10-CM

## 2018-06-04 DIAGNOSIS — Z79.4 TYPE 2 DIABETES MELLITUS WITH DIABETIC NEUROPATHY, WITH LONG-TERM CURRENT USE OF INSULIN (HCC): ICD-10-CM

## 2018-06-04 DIAGNOSIS — R60.0 LOWER EXTREMITY EDEMA: ICD-10-CM

## 2018-06-04 DIAGNOSIS — F41.9 ANXIETY: ICD-10-CM

## 2018-06-04 DIAGNOSIS — E11.40 TYPE 2 DIABETES MELLITUS WITH DIABETIC NEUROPATHY, WITH LONG-TERM CURRENT USE OF INSULIN (HCC): ICD-10-CM

## 2018-06-04 DIAGNOSIS — I11.9 BENIGN HYPERTENSIVE HEART DISEASE WITHOUT HEART FAILURE: ICD-10-CM

## 2018-06-04 LAB
HBA1C MFR BLD HPLC: 9.1 %
MICROALBUMIN UR TEST STR-MCNC: 150 MG/L
MICROALBUMIN/CREAT RATIO POC: NORMAL MG/G

## 2018-06-04 RX ORDER — CLOPIDOGREL BISULFATE 75 MG/1
75 TABLET ORAL DAILY
Qty: 90 TAB | Refills: 3 | Status: SHIPPED | OUTPATIENT
Start: 2018-06-04 | End: 2018-12-17 | Stop reason: SDUPTHER

## 2018-06-04 RX ORDER — DULOXETIN HYDROCHLORIDE 30 MG/1
30 CAPSULE, DELAYED RELEASE ORAL DAILY
Qty: 30 CAP | Refills: 2 | Status: SHIPPED | OUTPATIENT
Start: 2018-06-04 | End: 2018-06-04 | Stop reason: ALTCHOICE

## 2018-06-04 RX ORDER — ATORVASTATIN CALCIUM 80 MG/1
80 TABLET, FILM COATED ORAL
Qty: 90 TAB | Refills: 3 | Status: SHIPPED | OUTPATIENT
Start: 2018-06-04

## 2018-06-04 RX ORDER — ALPRAZOLAM 0.5 MG/1
0.5 TABLET ORAL
Qty: 20 TAB | Refills: 0 | Status: SHIPPED | OUTPATIENT
Start: 2018-06-04 | End: 2018-11-01 | Stop reason: SDUPTHER

## 2018-06-04 RX ORDER — ESCITALOPRAM OXALATE 10 MG/1
10 TABLET ORAL DAILY
Qty: 30 TAB | Refills: 0 | Status: SHIPPED | OUTPATIENT
Start: 2018-06-04 | End: 2018-07-07 | Stop reason: SDUPTHER

## 2018-06-04 RX ORDER — GABAPENTIN 300 MG/1
300 CAPSULE ORAL 3 TIMES DAILY
Qty: 270 CAP | Refills: 3 | Status: SHIPPED | OUTPATIENT
Start: 2018-06-04 | End: 2018-12-17 | Stop reason: SDUPTHER

## 2018-06-04 RX ORDER — ESCITALOPRAM OXALATE 10 MG/1
10 TABLET ORAL DAILY
Qty: 30 TAB | Refills: 2 | Status: SHIPPED | OUTPATIENT
Start: 2018-06-04 | End: 2018-06-04 | Stop reason: SDUPTHER

## 2018-06-04 RX ORDER — HYDROCHLOROTHIAZIDE 25 MG/1
25 TABLET ORAL DAILY
Qty: 30 TAB | Refills: 2 | Status: SHIPPED | OUTPATIENT
Start: 2018-06-04 | End: 2018-11-01 | Stop reason: SDUPTHER

## 2018-06-04 RX ORDER — ESCITALOPRAM OXALATE 10 MG/1
10 TABLET ORAL DAILY
Qty: 90 TAB | Refills: 3 | Status: SHIPPED | OUTPATIENT
Start: 2018-06-04 | End: 2018-06-04 | Stop reason: SDUPTHER

## 2018-06-04 NOTE — PROGRESS NOTES
(AWV) The Initial Medicare Annual Wellness Exam PROGRESS NOTE    This is an Initial Medicare Annual Wellness Exam (AWV)   I have reviewed the patient's medical history in detail and updated the computerized patient record. Paulina Mills is a 81 y.o.  female and presents for an annual wellness exam       Patient Active Problem List   Diagnosis Code    Diabetes E11.9    Hypertension I11.9    Dyslipidemia E78.5    Coronary artery disease I25.10    Uncontrolled diabetes mellitus ([address] E11.65    Stroke ([address] I63.9    Obesity, morbid ([address] E66.01    Type 2 diabetes with nephropathy ([address] E11.21    Type 2 diabetes mellitus with diabetic neuropathy ([address] E11.40    Advance care planning Z71.89     Patient Active Problem List    Diagnosis Date Noted    Advance care planning 06/04/2018    Obesity, morbid ([address] 03/29/2018    Type 2 diabetes with nephropathy ([address] 03/29/2018    Type 2 diabetes mellitus with diabetic neuropathy ([address] 03/29/2018    Stroke ([address] 03/20/2018    Uncontrolled diabetes mellitus ([address] 03/25/2014    Diabetes     Hypertension     Dyslipidemia     Coronary artery disease      Current Outpatient Prescriptions   Medication Sig Dispense Refill    Lancets misc Use tid 1 Package 11    glucose blood VI test strips (ASCENSIA AUTODISC VI, ONE TOUCH ULTRA TEST VI) strip by Does Not Apply route See Admin Instructions. Use tid 200 Strip 6    clopidogrel (PLAVIX) 75 mg tab Take 1 Tab by mouth daily. 30 Tab 2    Blood-Glucose Meter monitoring kit Test blood glucose 4 times a day. 1 Kit 0    atorvastatin (LIPITOR) 80 mg tablet Take 1 Tab by mouth nightly. 30 Tab 0    insulin aspart U-100 (NOVOLOG FLEXPEN U-100 INSULIN) 100 unit/mL inpn 3 Units by SubCUTAneous route Before breakfast, lunch, and dinner. 1 Pen 0    insulin glargine (LANTUS SOLOSTAR U-100 INSULIN) 100 unit/mL (3 mL) inpn 12 Units by SubCUTAneous route daily.  3 Pen 0    Insulin Needles, Disposable, (LITE TOUCH INSULIN PEN NEEDLES) 31 gauge x 1/4\" ndle 1 Each by Does Not Apply route five (5) times daily. 200 Pen Needle 6    aspirin (ASPIRIN) 325 mg tablet Take 1 Tab by mouth daily. 30 Tab 0    glucose 4 gram chewable tablet Take 4 tablets by mouth as needed (hypoglcemia). 100 tablet 1    gabapentin (NEURONTIN) 300 mg capsule Take 1 capsule by mouth nightly. 30 capsule 1    acetaminophen (TYLENOL EXTRA STRENGTH) 500 mg tablet Take 2 Tabs by mouth every six (6) hours as needed for Pain. 40 Tab 0    nitroglycerin (NITROSTAT) 0.4 mg SL tablet 1 Tab by SubLINGual route every five (5) minutes as needed for Chest Pain. 1 Bottle 2     No Known Allergies  Past Medical History:   Diagnosis Date    Arthritis     Bilateral cataracts     sees Dr. Lexi Tate    COPD     Coronary artery disease     Cx - 2.75 x 33mm XIENCE 10/13     Diabetes     Dyslipidemia     Hypertension     [facility]     Noncompliance     Obesity     TIA (transient ischemic attack) 3/20/2018     Past Surgical History:   Procedure Laterality Date    HX CORONARY STENT PLACEMENT  2013     Family History   Problem Relation Age of Onset    Arthritis-osteo Mother     Heart Disease Mother     Alcohol abuse Neg Hx     Asthma Neg Hx     Bleeding Prob Neg Hx     Cancer Neg Hx     Diabetes Neg Hx     Elevated Lipids Neg Hx     Headache Neg Hx     Hypertension Neg Hx     Lung Disease Neg Hx     Migraines Neg Hx     Psychiatric Disorder Neg Hx     Stroke Neg Hx     Mental Retardation Neg Hx      Social History   Substance Use Topics    Smoking status: Former Smoker     Packs/day: 1.00     Years: 54.00     Start date: 2/10/2014    Smokeless tobacco: Never Used      Comment: pt states she uses e-cigs to stop smoking    Alcohol use No       ROS   Constitutional: Negative for diaphoresis. HENT: Negative for congestion.    Eyes: Negative for discharge. Respiratory: Negative for stridor.    Cardiovascular: Negative for palpitations. Gastrointestinal: Negative for diarrhea. Genitourinary: Negative for flank pain. Musculoskeletal: Negative for back pain. Neurological: Positive for numbness. Negative for weakness. Psychiatric/Behavioral: Negative for hallucinations. All other systems reviewed and are negative. History     Past Medical History:   Diagnosis Date    Arthritis     Bilateral cataracts     sees Dr. Brando Emmanuel    COPD     Coronary artery disease     Cx - 2.75 x 33mm XIENCE 10/13     Diabetes     Dyslipidemia     Hypertension     MI (myocardial infarction) ([address]     Noncompliance     Obesity     TIA (transient ischemic attack) 3/20/2018      Past Surgical History:   Procedure Laterality Date    HX CORONARY STENT PLACEMENT  2013     Current Outpatient Prescriptions   Medication Sig Dispense Refill    Lancets misc Use tid 1 Package 11    glucose blood VI test strips (ASCENSIA AUTODISC VI, ONE TOUCH ULTRA TEST VI) strip by Does Not Apply route See Admin Instructions. Use tid 200 Strip 6    clopidogrel (PLAVIX) 75 mg tab Take 1 Tab by mouth daily. 30 Tab 2    Blood-Glucose Meter monitoring kit Test blood glucose 4 times a day. 1 Kit 0    atorvastatin (LIPITOR) 80 mg tablet Take 1 Tab by mouth nightly. 30 Tab 0    insulin aspart U-100 (NOVOLOG FLEXPEN U-100 INSULIN) 100 unit/mL inpn 3 Units by SubCUTAneous route Before breakfast, lunch, and dinner. 1 Pen 0    insulin glargine (LANTUS SOLOSTAR U-100 INSULIN) 100 unit/mL (3 mL) inpn 12 Units by SubCUTAneous route daily. 3 Pen 0    Insulin Needles, Disposable, (LITE TOUCH INSULIN PEN NEEDLES) 31 gauge x 1/4\" ndle 1 Each by Does Not Apply route five (5) times daily. 200 Pen Needle 6    aspirin (ASPIRIN) 325 mg tablet Take 1 Tab by mouth daily. 30 Tab 0    glucose 4 gram chewable tablet Take 4 tablets by mouth as needed (hypoglcemia). 100 tablet 1    gabapentin (NEURONTIN) 300 mg capsule Take 1 capsule by mouth nightly.  30 capsule 1    acetaminophen (TYLENOL EXTRA STRENGTH) 500 mg tablet Take 2 Tabs by mouth every six (6) hours as needed for Pain. 40 Tab 0    nitroglycerin (NITROSTAT) 0.4 mg SL tablet 1 Tab by SubLINGual route every five (5) minutes as needed for Chest Pain. 1 Bottle 2     No Known Allergies  Family History   Problem Relation Age of Onset    Arthritis-osteo Mother     Heart Disease Mother     Alcohol abuse Neg Hx     Asthma Neg Hx     Bleeding Prob Neg Hx     Cancer Neg Hx     Diabetes Neg Hx     Elevated Lipids Neg Hx     Headache Neg Hx     Hypertension Neg Hx     Lung Disease Neg Hx     Migraines Neg Hx     Psychiatric Disorder Neg Hx     Stroke Neg Hx     Mental Retardation Neg Hx      Social History   Substance Use Topics    Smoking status: Former Smoker     Packs/day: 1.00     Years: 54.00     Start date: 2/10/2014    Smokeless tobacco: Never Used      Comment: pt states she uses e-cigs to stop smoking    Alcohol use No     Patient Active Problem List   Diagnosis Code    Diabetes E11.9    Hypertension I11.9    Dyslipidemia E78.5    Coronary artery disease I25.10    Uncontrolled diabetes mellitus ([facility] E11.65    Stroke ([facility] I63.9    Obesity, morbid (HCC) E66.01    Type 2 diabetes with nephropathy (HCC) E11.21    Type 2 diabetes mellitus with diabetic neuropathy (HCC) E11.40    Advance care planning Z71.89       Health Maintenance History  Immunizations reviewed, dtap due , pneumovax due, flu due, zoster due  Colonoscopy: up to date,   Eye exam: due  Mammo due  Dexascan due        Depression Risk Factor Screening:      Patient Health Questionnaire (PHQ-2)   Over the last 2 weeks, how often have you been bothered by any of the following problems? · Little interest or pleasure in doing things? · Not at all. [0]  · Feeling down, depressed, or hopeless?    · Not at all. [0]    Total Score: 0/6  PHQ-2 Assessment Scoring:   A score of 2 or more requires further screening with the PHQ-9    Alcohol Risk Factor Screening: Women: On any occasion during the past 3 months, have you had more than 3 drinks containing alcohol? no   Do you average more than 7 drinks per week? no    Functional Ability and Level of Safety:     Hearing Loss    The patient needs further evaluation. Activities of Daily Living   Self-care. Requires assistance with: no ADLs    Fall Risk   Impaired (20 pts)  Forgets limitations (15 pts)  Score: 35    Abuse Screen   Patient is not abused    Examination   Physical Examination  Vitals:    06/04/18 1416   BP: 141/61   Pulse: (!) 58   Resp: 18   Temp: 97.5 °F (36.4 °C)   TempSrc: Oral   SpO2: 96%   Weight: 234 lb (106.1 kg)   Height: 5' 3\" (1.6 m)   PainSc:   0 - No pain      Body mass index is 41.45 kg/(m^2). Evaluation of Cognitive Function:  Mood/affect:good mood with appropriate affect  Appearance: well kempt  Family member/caregiver input: n/a    alert, well appearing, and in no distress, oriented to person, place, and time and morbidly obese    Patient Care Team:  Rolando Salinas MD as PCP - General (Family Practice)    End-of-life planning  Advanced Directive in the case than an injury or illness causes the patient to be unable to make health care decisions    Health Care Directive or Living Will: no    Advice/Referrals/Counselling/Plan:   Education and counseling provided:  End-of-Life planning (with patient's consent)  Screening Mammography  Medical nutrition therapy for individuals with diabetes or renal disease  Include in education list (weight loss, physical activity, smoking cessation, fall prevention, and nutrition)    ICD-10-CM ICD-9-CM    1. Advance care planning Z71.89 V65.49 ADVANCE CARE PLANNING FIRST 30 MINS   2. Medicare annual wellness visit, initial Z00.00 V70.0 [id number] [employer]   3.  Type 2 diabetes mellitus with diabetic neuropathy, with long-term current use of insulin (HCC) E11.40 250.60  DIABETES FOOT EXAM    Z79.4 357.2 AMB POC HEMOGLOBIN A1C     V58.67 gabapentin (NEURONTIN) 300 mg capsule      AMB POC URINE, MICROALBUMIN, SEMIQUANTITATIVE   4. Type 2 diabetes with nephropathy (HCC) E11.21 250.40      583.81    5. Lower extremity edema R60.0 782.3 hydroCHLOROthiazide (HYDRODIURIL) 25 mg tablet   6. Macular edema due to secondary diabetes ([facility] E13.311 249.50      362.07      362.01    7. Anxiety F41.9 300.00 ALPRAZolam (XANAX) 0.5 mg tablet      escitalopram oxalate (LEXAPRO) 10 mg tablet      DISCONTINUED: DULoxetine (CYMBALTA) 30 mg capsule      DISCONTINUED: escitalopram oxalate (LEXAPRO) 10 mg tablet      DISCONTINUED: escitalopram oxalate (LEXAPRO) 10 mg tablet   8. Obesity, morbid ([place] [address].) E66.01 278.01    9. Hypertension I11.9 402.10 hydroCHLOROthiazide (HYDRODIURIL) 25 mg tablet   10. Atherosclerosis of native coronary artery with angina pectoris, unspecified whether native or transplanted heart ([place] [address].) I25.119 414.01 atorvastatin (LIPITOR) 80 mg tablet     413.9 clopidogrel (PLAVIX) 75 mg tab   . Brief written plan, checklist    I have discussed the diagnosis with the patient and the intended plan as seen in the above orders. The patient has received an after-visit summary and questions were answered concerning future plans. I have discussed medication side effects and warnings with the patient as well. I have reviewed the plan of care with the patient, accepted their input and they are in agreement with the treatment goals. Follow-up Disposition:  Return in about 3 months (around 9/4/2018) for medication evaluation. ____________________________________________________________    Problem Assessment    for treatment of   Chief Complaint   Patient presents with    Medication Refill         SUBJECTIVE  Well Adult Physical   Patient here for a comprehensive physical exam.The patient reports problems - hypertension, dm, CAD  Do you take any herbs or supplements that were not prescribed by a doctor? no Are you taking calcium supplements?  no Are you taking aspirin daily? no  Hypertension  Patient is here for evaluation of hypertension. Age at onset of elevated blood pressure:  52. She indicates that she is has noted upper extremity weakness. She denies any symptoms referable to her elevated blood pressure. Specifically denies chest pain, palpitations, dyspnea, orthopnea, PND or peripheral edema. Current medication regimen is as listed   below. Patient has these side effects of medication: none. Cardiovascular risk factors: dyslipidemia, diabetes mellitus, obesity, sedentary life style, hypertension, post-menopausal Use of agents associated with hypertension: none History of renal disease: negative  History of flank trauma: negative     Cardiovascular Review:  The patient has diabetes, hypertension, hyperlipidemia and history of prior stroke. Diet and Lifestyle: not attempting to follow a low fat, low cholesterol diet, not attempting to follow a low sodium diet, does not rigorously follow a diabetic diet, sedentary, nonsmoker  Home BP Monitoring: is not measured at home. Pertinent ROS: taking medications as instructed, no medication side effects noted, no TIA's, no chest pain on exertion, no dyspnea on exertion, no swelling of ankles. Diabetes Mellitus:  She has diabetes mellitus, and  hypertension, hyperlipidemia, history of prior stroke and obesity. Diabetic ROS - medication compliance: compliant all of the time, diabetic diet compliance: compliant most of the time, home glucose monitoring: is performed sporadically. Lab review: labs reviewed, I note that glycosylated hemoglobin abnormal high. Visit Vitals    /60 (BP 1 Location: Left arm, BP Patient Position: Sitting)    Pulse (!) 58    Temp 97.5 °F (36.4 °C) (Oral)    Resp 18    Ht 5' 3\" (1.6 m)    Wt 234 lb (106.1 kg)    SpO2 96%    BMI 41.45 kg/m2     General:  Alert, cooperative, no distress, appears stated age. Head:  Normocephalic, without obvious abnormality, atraumatic. Eyes:  Conjunctivae/corneas clear. PERRL, EOMs intact. Ears:  Normal TMs and external ear canals both ears. Nose: Nares normal. Septum midline. Mucosa normal. No drainage or sinus tenderness. Throat: Lips, mucosa, and tongue normal. Teeth and gums normal.   Neck: Supple, symmetrical, trachea midline, no adenopathy, thyroid: no enlargement/tenderness/nodules. Back:   Symmetric, no curvature. ROM normal.   Lungs:   Clear to auscultation bilaterally. Chest wall:  No tenderness or deformity. Heart:  Regular rate and rhythm, S1, S2 normal, no murmur, click, rub or gallop. Breast Exam:  deferred. Abdomen:   Soft, non-tender. Bowel sounds normal. No masses,  No organomegaly. Genitalia:  deferred   Rectal:  deferred   Extremities: Extremities normal, atraumatic, no cyanosis or edema. Pulses: 2+ and symmetric all extremities. Skin: Skin color, texture, turgor normal. No rashes or lesions. Lymph nodes: Cervical, supraclavicular, and axillary nodes normal.   Neurologic: CNII-XII intact. Normal strength, sensation and reflexes throughout. Diabetic foot exam:     Left Foot:   Visual Exam: normal    Pulse DP: 2+ (normal)   Filament test: reduced sensation       Right Foot:   Visual Exam: normal    Pulse DP: 2+ (normal)   Filament test: reduced sensation       Assessment/Plan:    1. Advance care planning  See ACP  - ADVANCE CARE PLANNING FIRST 30 MINS    2. Medicare annual wellness visit, initial  Reviewed preventive recommendations  - [address] [place] [address] [employer]    3. Type 2 diabetes mellitus with diabetic neuropathy, with long-term current use of insulin ([facility])  Goal hgb a1c <7; poor control with current treatment; increase insulin for goal fasting     - [facility] DIABETES FOOT EXAM  - AMB POC HEMOGLOBIN A1C  - gabapentin (NEURONTIN) 300 mg capsule; Take 1 Cap by mouth three (3) times daily. Dispense: 270 Cap; Refill: 3  - AMB POC URINE, MICROALBUMIN, SEMIQUANTITATIVE    4.  Type 2 diabetes with nephropathy (HCC)  Improve glucose and blood pressure control; optimize medications for renal function    5. Lower extremity edema  Continue hctz  - hydroCHLOROthiazide (HYDRODIURIL) 25 mg tablet; Take 1 Tab by mouth daily. Dispense: 30 Tab; Refill: 2    6. Macular edema due to secondary diabetes [facility])  F/u with ophthalmology    7. Anxiety  Continue with caution  - ALPRAZolam (XANAX) 0.5 mg tablet; Take 1 Tab by mouth three (3) times daily as needed for Anxiety. Max Daily Amount: 1.5 mg.  Dispense: 20 Tab; Refill: 0  - escitalopram oxalate (LEXAPRO) 10 mg tablet; Take 1 Tab by mouth daily. Dispense: 30 Tab; Refill: 0    8. Obesity, morbid ([address]  I have reviewed/discussed the above normal BMI with the patient. I have recommended the following interventions: dietary management education, guidance, and counseling and encourage exercise . 9. Hypertension  Goal <130/80; continue treatment  - hydroCHLOROthiazide (HYDRODIURIL) 25 mg tablet; Take 1 Tab by mouth daily. Dispense: 30 Tab; Refill: 2    10. Atherosclerosis of native coronary artery with angina pectoris, unspecified whether native or transplanted heart (HCC)  Continue statin and anticoagulation  - atorvastatin (LIPITOR) 80 mg tablet; Take 1 Tab by mouth nightly. Dispense: 90 Tab; Refill: 3  - clopidogrel (PLAVIX) 75 mg tab; Take 1 Tab by mouth daily. Dispense: 90 Tab;  Refill: 3    Lab review: labs reviewed, I note that glycosylated hemoglobin abnormal high

## 2018-06-04 NOTE — MR AVS SNAPSHOT
[address] [place], [place] [address] [place] [id number] 
[phone] Patient: Kelly Mace MRN: [id number] ALEXIS:[birth year] Visit Information Date & Time Provider Department Dept. Phone Encounter #  
 6/4/2018  2:30 PM Frankie Mills, [address] [facility] [phone] Follow-up Instructions Return in about 3 months (around 9/4/2018) for medication evaluation. Upcoming Health Maintenance Date Due Hepatitis C Screening 1945 FOOT EXAM Q1 9/23/1957 DTaP/Tdap/Td series (1 - Tdap) 9/23/1968 BREAST CANCER SCRN MAMMOGRAM 9/23/1997 FOBT Q 1 YEAR AGE 50-75 9/23/1997 ZOSTER VACCINE AGE 60> 7/23/2007 Bone Densitometry (Dexa) Screening 9/23/2012 EYE EXAM RETINAL OR DILATED Q1 11/17/2015 MICROALBUMIN Q1 12/1/2015 Pneumococcal 65+ Low/Medium Risk (2 of 2 - PCV13) 12/9/2015 GLAUCOMA SCREENING Q2Y 11/17/2016 MEDICARE YEARLY EXAM 3/20/2018 Influenza Age 5 to Adult 8/1/2018 HEMOGLOBIN A1C Q6M 9/20/2018 LIPID PANEL Q1 3/20/2019 Allergies as of 6/4/2018  Review Complete On: 6/4/2018 By: Frankie Mills MD  
 No Known Allergies Current Immunizations  Reviewed on 11/3/2014 Name Date Influenza Vaccine 11/3/2014  1:15 PM  
 Influenza Vaccine PF 10/23/2013 10:25 AM  
 Influenza Vaccine Split 10/16/2012 Pneumococcal Polysaccharide (PPSV-23) 12/9/2014  3:02 PM  
  
 Not reviewed this visit You Were Diagnosed With   
  
 Codes Comments Advance care planning    -  Primary ICD-10-CM: Z71.89 ICD-9-CM: V65.49 Medicare annual wellness visit, initial     ICD-10-CM: Z00.00 ICD-9-CM: V70.0 Type 2 diabetes mellitus with diabetic neuropathy, with long-term current use of insulin (HCC)     ICD-10-CM: E11.40, Z79.4 ICD-9-CM: 250.60, 357.2, V58.67 Type 2 diabetes with nephropathy (HCC)     ICD-10-CM: E11.21 
ICD-9-CM: 250.40, 583.81 Lower extremity edema     ICD-10-CM: R60.0 ICD-9-CM: 782.3 Macular edema due to secondary diabetes [facility])     ICD-10-CM: A38.839 ICD-9-CM: 249.50, 362.07, 362.01 Anxiety     ICD-10-CM: F41.9 ICD-9-CM: 300.00 Obesity, morbid ([address]     ICD-10-CM: E66.01 
ICD-9-CM: 278.01 Benign hypertensive heart disease without heart failure     ICD-10-CM: I11.9 ICD-9-CM: 402.10 Vitals BP Pulse Temp Resp Height(growth percentile) Weight(growth percentile) 122/60 (BP 1 Location: Left arm, BP Patient Position: Sitting) (!) 58 97.5 °F (36.4 °C) (Oral) 18 5' 3\" (1.6 m) 234 lb (106.1 kg) SpO2 BMI OB Status Smoking Status 96% 41.45 kg/m2 Postmenopausal Former Smoker Vitals History BMI and BSA Data Body Mass Index Body Surface Area  
 41.45 kg/m 2 2.17 m 2 Preferred Pharmacy Pharmacy Name Phone [facility] [address] [place] [address] [address] [phone] Your Updated Medication List  
  
   
This list is accurate as of 6/4/18  3:23 PM.  Always use your most recent med list.  
  
  
  
  
 acetaminophen 500 mg tablet Commonly known as:  [address] [facility] Take 2 Tabs by mouth every six (6) hours as needed for Pain. ALPRAZolam 0.5 mg tablet Commonly known as:  Fairy Ny Take 1 Tab by mouth three (3) times daily as needed for Anxiety. Max Daily Amount: 1.5 mg.  
  
 aspirin 325 mg tablet Commonly known as:  ASPIRIN Take 1 Tab by mouth daily. atorvastatin 80 mg tablet Commonly known as:  LIPITOR Take 1 Tab by mouth nightly. Blood-Glucose Meter monitoring kit Test blood glucose 4 times a day. clopidogrel 75 mg Tab Commonly known as:  PLAVIX Take 1 Tab by mouth daily. escitalopram oxalate 10 mg tablet Commonly known as:  Celesta Prost Take 1 Tab by mouth daily. gabapentin 300 mg capsule Commonly known as:  NEURONTIN Take 1 Cap by mouth three (3) times daily. glucose 4 gram chewable tablet Take 4 tablets by mouth as needed (hypoglcemia). glucose blood VI test strips strip Commonly known as:  ASCENSIA AUTODISC VI, ONE TOUCH ULTRA TEST VI  
by Does Not Apply route See Admin Instructions. Use tid  
  
 insulin aspart U-100 100 unit/mL Inpn Commonly known as:  NovoLOG Flexpen U-100 Insulin 3 Units by SubCUTAneous route Before breakfast, lunch, and dinner. insulin glargine 100 unit/mL (3 mL) Inpn Commonly known as:  LANTUS SOLOSTAR U-100 INSULIN  
12 Units by SubCUTAneous route daily. Insulin Needles (Disposable) 31 gauge x 1/4\" Ndle Commonly known as:  LITE TOUCH INSULIN PEN NEEDLES  
1 Each by Does Not Apply route five (5) times daily. Lancets Misc Use tid  
  
 nitroglycerin 0.4 mg SL tablet Commonly known as:  NITROSTAT  
1 Tab by SubLINGual route every five (5) minutes as needed for Chest Pain. Prescriptions Printed Refills ALPRAZolam (XANAX) 0.5 mg tablet 0 Sig: Take 1 Tab by mouth three (3) times daily as needed for Anxiety. Max Daily Amount: 1.5 mg.  
 Class: Print Route: Oral  
  
Prescriptions Sent to Pharmacy Refills  
 gabapentin (NEURONTIN) 300 mg capsule 3 Sig: Take 1 Cap by mouth three (3) times daily. Class: Normal  
 Pharmacy: [address] [place] [address] Ph #: [phone] Route: Oral  
 escitalopram oxalate (LEXAPRO) 10 mg tablet 2 Sig: Take 1 Tab by mouth daily. Class: Normal  
 Pharmacy: [address] [place] [address] Ph #: [phone] Route: Oral  
  
We Performed the Following ADVANCE CARE PLANNING FIRST 30 MINS [31724 CPT(R)] AMB POC HEMOGLOBIN A1C [25897 CPT(R)] AMB POC URINE, MICROALBUMIN, SEMIQUANTITATIVE [57356 CPT(R)]  DIABETES FOOT EXAM [HM7 Custom] [address] [address] [ [facility]] Follow-up Instructions Return in about 3 months (around 9/4/2018) for medication evaluation. Introducing [facility]! Priscila Hicks introduces [employer] patient portal. Now you can access parts of your medical record, email your doctor's office, and request medication refills online. 1. In your internet browser, go to [email or web address]. [employer]/[facility] 2. Click on the First Time User? Click Here link in the Sign In box. You will see the New Member Sign Up page. 3. Enter your [employer] Access Code exactly as it appears below. You will not need to use this code after youve completed the sign-up process. If you do not sign up before the expiration date, you must request a new code. · [employer] Access Code: [email or web address] Expires: 6/19/2018 12:49 PM 
 
4. Enter the last four digits of your Social Security Number (xxxx) and Date of Birth (mm/dd/yyyy) as indicated and click Submit. You will be taken to the next sign-up page. 5. Create a [employer] ID. This will be your [employer] login ID and cannot be changed, so think of one that is secure and easy to remember. 6. Create a [employer] password. You can change your password at any time. 7. Enter your Password Reset Question and Answer. This can be used at a later time if you forget your password. 8. Enter your e-mail address. You will receive e-mail notification when new information is available in [address] 9. Click Sign Up. You can now view and download portions of your medical record. 10. Click the Download Summary menu link to download a portable copy of your medical information. If you have questions, please visit the Frequently Asked Questions section of the [employer] website. Remember, [employer] is NOT to be used for urgent needs. For medical emergencies, dial 911. Now available from your iPhone and Android! Please provide this summary of care documentation to your next provider. Your primary care clinician is listed as Chong Scales. If you have any questions after today's visit, please call [phone].

## 2018-06-04 NOTE — PROGRESS NOTES
1. Have you been to the ER, urgent care clinic since your last visit? Hospitalized since your last visit? NO    2. Have you seen or consulted any other health care providers outside of the [facility] since your last visit? Include any pap smears or colon screening.  NO

## 2018-06-11 DIAGNOSIS — E11.40 TYPE 2 DIABETES MELLITUS WITH DIABETIC NEUROPATHY, WITH LONG-TERM CURRENT USE OF INSULIN (HCC): ICD-10-CM

## 2018-06-11 DIAGNOSIS — I25.119 ATHEROSCLEROSIS OF NATIVE CORONARY ARTERY WITH ANGINA PECTORIS, UNSPECIFIED WHETHER NATIVE OR TRANSPLANTED HEART (HCC): ICD-10-CM

## 2018-06-11 DIAGNOSIS — Z79.4 TYPE 2 DIABETES MELLITUS WITH DIABETIC NEUROPATHY, WITH LONG-TERM CURRENT USE OF INSULIN (HCC): ICD-10-CM

## 2018-06-11 RX ORDER — ATORVASTATIN CALCIUM 80 MG/1
80 TABLET, FILM COATED ORAL
Qty: 90 TAB | Refills: 3 | Status: CANCELLED | OUTPATIENT
Start: 2018-06-11

## 2018-06-11 RX ORDER — GABAPENTIN 300 MG/1
300 CAPSULE ORAL 3 TIMES DAILY
Qty: 270 CAP | Refills: 3 | Status: CANCELLED | OUTPATIENT
Start: 2018-06-11

## 2018-06-11 NOTE — TELEPHONE ENCOUNTER
Contacted the patient and advised her that the two medications, gabapentin and Lipitor, were sent to [address] [place] [address] Dr with 90 tabs and 3 refills. Patient was advised that the lexapro was sent to [facility] on [address] for pickup. Patient stated that she will contact [facility] and advised them that it was sent on 6/4/18. Advised patient to contact the office if she is having any issue obtaining her medication. Patient verbalized understanding and tolerated well.

## 2018-06-12 NOTE — ACP (ADVANCE CARE PLANNING)
Advance Care Planning (ACP) Provider Note - Comprehensive     Date of ACP Conversation: 06/04/18  Persons included in Conversation:  patient  Length of ACP Conversation in minutes:  16 minutes    Authorized Decision Maker (if patient is incapable of making informed decisions): This person is:  her niece          General ACP for ALL Patients with Decision Making Capacity:   Importance of advance care planning, including choosing a healthcare agent to communicate patient's healthcare decisions if patient lost the ability to make decisions, such as after a sudden illness or accident  Understanding of the healthcare agent role was assessed and information provided  Exploration of values, goals, and preferences if recovery is not expected, even with continued medical treatment in the event of: Imminent death  Severe, permanent brain injury  \"In these circumstances, what matters most to you? \"  Care focused more on comfort or quality of life. \"What, if any, treatments would you want to avoid? \" none  Opportunity offered to explore how cultural, Jewish, spiritual, or personal beliefs would affect decisions for future care     Review of Existing Advance Directive:  What information were you given about medical decisions to consider before completing your advance directive? none    For Serious or Chronic Illness:  Understanding of medical condition      Interventions Provided:  Recommended completion of Advance Directive form after review of ACP materials and conversation with prospective healthcare agent

## 2018-07-07 DIAGNOSIS — F41.9 ANXIETY: ICD-10-CM

## 2018-07-07 RX ORDER — ESCITALOPRAM OXALATE 10 MG/1
TABLET ORAL
Qty: 30 TAB | Refills: 0 | Status: SHIPPED | OUTPATIENT
Start: 2018-07-07 | End: 2019-01-14 | Stop reason: SDUPTHER

## 2018-07-25 RX ORDER — INSULIN GLARGINE 100 [IU]/ML
INJECTION, SOLUTION SUBCUTANEOUS
Qty: 15 ML | Refills: 0 | Status: SHIPPED | OUTPATIENT
Start: 2018-07-25 | End: 2018-09-17 | Stop reason: SDUPTHER

## 2018-07-25 RX ORDER — INSULIN ASPART 100 [IU]/ML
INJECTION, SOLUTION INTRAVENOUS; SUBCUTANEOUS
Qty: 15 ML | Refills: 0 | Status: SHIPPED | OUTPATIENT
Start: 2018-07-25 | End: 2018-09-17 | Stop reason: SDUPTHER

## 2018-09-18 RX ORDER — INSULIN ASPART 100 [IU]/ML
INJECTION, SOLUTION INTRAVENOUS; SUBCUTANEOUS
Qty: 15 ML | Refills: 0 | Status: SHIPPED | OUTPATIENT
Start: 2018-09-18 | End: 2018-11-26 | Stop reason: SDUPTHER

## 2018-09-18 RX ORDER — INSULIN GLARGINE 100 [IU]/ML
INJECTION, SOLUTION SUBCUTANEOUS
Qty: 15 ML | Refills: 0 | Status: SHIPPED | OUTPATIENT
Start: 2018-09-18 | End: 2018-11-26 | Stop reason: SDUPTHER

## 2018-10-23 ENCOUNTER — APPOINTMENT (OUTPATIENT)
Dept: GENERAL RADIOLOGY | Age: 73
DRG: 243 | End: 2018-10-23
Attending: PHYSICIAN ASSISTANT
Payer: MEDICARE

## 2018-10-23 ENCOUNTER — HOSPITAL ENCOUNTER (INPATIENT)
Age: 73
LOS: 4 days | Discharge: HOME | DRG: 243 | End: 2018-10-27
Attending: EMERGENCY MEDICINE | Admitting: HOSPITALIST
Payer: MEDICARE

## 2018-10-23 DIAGNOSIS — R07.9 CHEST PAIN, UNSPECIFIED TYPE: ICD-10-CM

## 2018-10-23 DIAGNOSIS — I44.1 MOBITZ TYPE 2 SECOND DEGREE ATRIOVENTRICULAR BLOCK: Primary | ICD-10-CM

## 2018-10-23 DIAGNOSIS — I45.9 HEART BLOCK: ICD-10-CM

## 2018-10-23 DIAGNOSIS — R00.1 BRADYCARDIA: ICD-10-CM

## 2018-10-23 LAB
ALBUMIN SERPL-MCNC: 2.6 G/DL (ref 3.4–5)
ALBUMIN/GLOB SERPL: 0.8 {RATIO} (ref 0.8–1.7)
ALP SERPL-CCNC: 116 U/L (ref 45–117)
ALT SERPL-CCNC: 20 U/L (ref 13–56)
ANION GAP SERPL CALC-SCNC: 6 MMOL/L (ref 3–18)
AST SERPL-CCNC: 19 U/L (ref 15–37)
BASOPHILS # BLD: 0 K/UL (ref 0–0.1)
BASOPHILS NFR BLD: 0 % (ref 0–2)
BILIRUB SERPL-MCNC: 0.3 MG/DL (ref 0.2–1)
BUN SERPL-MCNC: 20 MG/DL (ref 7–18)
BUN/CREAT SERPL: 16 (ref 12–20)
CALCIUM SERPL-MCNC: 8 MG/DL (ref 8.5–10.1)
CHLORIDE SERPL-SCNC: 113 MMOL/L (ref 100–108)
CK MB CFR SERPL CALC: 2.6 % (ref 0–4)
CK MB SERPL-MCNC: 1.4 NG/ML (ref 5–25)
CK SERPL-CCNC: 53 U/L (ref 26–192)
CO2 SERPL-SCNC: 27 MMOL/L (ref 21–32)
CREAT SERPL-MCNC: 1.22 MG/DL (ref 0.6–1.3)
DIFFERENTIAL METHOD BLD: ABNORMAL
EOSINOPHIL # BLD: 0.1 K/UL (ref 0–0.4)
EOSINOPHIL NFR BLD: 1 % (ref 0–5)
ERYTHROCYTE [DISTWIDTH] IN BLOOD BY AUTOMATED COUNT: 12.8 % (ref 11.6–14.5)
GLOBULIN SER CALC-MCNC: 3.3 G/DL (ref 2–4)
GLUCOSE BLD STRIP.AUTO-MCNC: 164 MG/DL (ref 70–110)
GLUCOSE SERPL-MCNC: 187 MG/DL (ref 74–99)
HCT VFR BLD AUTO: 35.9 % (ref 35–45)
HGB BLD-MCNC: 11.7 G/DL (ref 12–16)
LYMPHOCYTES # BLD: 1.1 K/UL (ref 0.9–3.6)
LYMPHOCYTES NFR BLD: 19 % (ref 21–52)
MAGNESIUM SERPL-MCNC: 1.9 MG/DL (ref 1.6–2.6)
MCH RBC QN AUTO: 28.3 PG (ref 24–34)
MCHC RBC AUTO-ENTMCNC: 32.6 G/DL (ref 31–37)
MCV RBC AUTO: 86.9 FL (ref 74–97)
MONOCYTES # BLD: 0.7 K/UL (ref 0.05–1.2)
MONOCYTES NFR BLD: 13 % (ref 3–10)
NEUTS SEG # BLD: 3.6 K/UL (ref 1.8–8)
NEUTS SEG NFR BLD: 67 % (ref 40–73)
PLATELET # BLD AUTO: 110 K/UL (ref 135–420)
PMV BLD AUTO: 11.3 FL (ref 9.2–11.8)
POTASSIUM SERPL-SCNC: 4.2 MMOL/L (ref 3.5–5.5)
PROT SERPL-MCNC: 5.9 G/DL (ref 6.4–8.2)
RBC # BLD AUTO: 4.13 M/UL (ref 4.2–5.3)
SODIUM SERPL-SCNC: 146 MMOL/L (ref 136–145)
TROPONIN I SERPL-MCNC: <0.02 NG/ML (ref 0–0.04)
WBC # BLD AUTO: 5.5 K/UL (ref 4.6–13.2)

## 2018-10-23 PROCEDURE — 83036 HEMOGLOBIN GLYCOSYLATED A1C: CPT | Performed by: HOSPITALIST

## 2018-10-23 PROCEDURE — 96374 THER/PROPH/DIAG INJ IV PUSH: CPT

## 2018-10-23 PROCEDURE — 74011250637 HC RX REV CODE- 250/637: Performed by: PHYSICIAN ASSISTANT

## 2018-10-23 PROCEDURE — 71045 X-RAY EXAM CHEST 1 VIEW: CPT

## 2018-10-23 PROCEDURE — 85025 COMPLETE CBC W/AUTO DIFF WBC: CPT | Performed by: PHYSICIAN ASSISTANT

## 2018-10-23 PROCEDURE — 83735 ASSAY OF MAGNESIUM: CPT | Performed by: PHYSICIAN ASSISTANT

## 2018-10-23 PROCEDURE — 65660000001 HC RM ICU INTERMED STEPDOWN

## 2018-10-23 PROCEDURE — 93005 ELECTROCARDIOGRAM TRACING: CPT

## 2018-10-23 PROCEDURE — 74011250636 HC RX REV CODE- 250/636: Performed by: PHYSICIAN ASSISTANT

## 2018-10-23 PROCEDURE — 82962 GLUCOSE BLOOD TEST: CPT

## 2018-10-23 PROCEDURE — 82550 ASSAY OF CK (CPK): CPT | Performed by: PHYSICIAN ASSISTANT

## 2018-10-23 PROCEDURE — 80053 COMPREHEN METABOLIC PANEL: CPT | Performed by: PHYSICIAN ASSISTANT

## 2018-10-23 PROCEDURE — 99285 EMERGENCY DEPT VISIT HI MDM: CPT

## 2018-10-23 PROCEDURE — 96361 HYDRATE IV INFUSION ADD-ON: CPT

## 2018-10-23 RX ORDER — ACETAMINOPHEN 500 MG
1000 TABLET ORAL
Status: DISCONTINUED | OUTPATIENT
Start: 2018-10-23 | End: 2018-10-27 | Stop reason: HOSPADM

## 2018-10-23 RX ORDER — SODIUM CHLORIDE 9 MG/ML
50 INJECTION, SOLUTION INTRAVENOUS CONTINUOUS
Status: DISCONTINUED | OUTPATIENT
Start: 2018-10-24 | End: 2018-10-27 | Stop reason: HOSPADM

## 2018-10-23 RX ORDER — FENTANYL CITRATE 50 UG/ML
25 INJECTION, SOLUTION INTRAMUSCULAR; INTRAVENOUS
Status: COMPLETED | OUTPATIENT
Start: 2018-10-23 | End: 2018-10-23

## 2018-10-23 RX ORDER — CLOPIDOGREL BISULFATE 75 MG/1
75 TABLET ORAL DAILY
Status: DISCONTINUED | OUTPATIENT
Start: 2018-10-24 | End: 2018-10-27 | Stop reason: HOSPADM

## 2018-10-23 RX ORDER — HYDROCHLOROTHIAZIDE 25 MG/1
25 TABLET ORAL DAILY
Status: DISCONTINUED | OUTPATIENT
Start: 2018-10-24 | End: 2018-10-27 | Stop reason: HOSPADM

## 2018-10-23 RX ORDER — ONDANSETRON 2 MG/ML
4 INJECTION INTRAMUSCULAR; INTRAVENOUS
Status: DISCONTINUED | OUTPATIENT
Start: 2018-10-23 | End: 2018-10-27 | Stop reason: HOSPADM

## 2018-10-23 RX ORDER — INSULIN LISPRO 100 [IU]/ML
INJECTION, SOLUTION INTRAVENOUS; SUBCUTANEOUS
Status: DISCONTINUED | OUTPATIENT
Start: 2018-10-24 | End: 2018-10-27 | Stop reason: HOSPADM

## 2018-10-23 RX ORDER — ASPIRIN 325 MG
325 TABLET ORAL DAILY
Status: DISCONTINUED | OUTPATIENT
Start: 2018-10-24 | End: 2018-10-27 | Stop reason: HOSPADM

## 2018-10-23 RX ORDER — ESCITALOPRAM OXALATE 10 MG/1
10 TABLET ORAL DAILY
Status: DISCONTINUED | OUTPATIENT
Start: 2018-10-24 | End: 2018-10-27 | Stop reason: HOSPADM

## 2018-10-23 RX ORDER — INSULIN GLARGINE 100 [IU]/ML
12 INJECTION, SOLUTION SUBCUTANEOUS DAILY
Status: DISCONTINUED | OUTPATIENT
Start: 2018-10-24 | End: 2018-10-27 | Stop reason: HOSPADM

## 2018-10-23 RX ORDER — GABAPENTIN 300 MG/1
300 CAPSULE ORAL 3 TIMES DAILY
Status: DISCONTINUED | OUTPATIENT
Start: 2018-10-24 | End: 2018-10-27 | Stop reason: HOSPADM

## 2018-10-23 RX ORDER — ENOXAPARIN SODIUM 100 MG/ML
40 INJECTION SUBCUTANEOUS EVERY 24 HOURS
Status: DISCONTINUED | OUTPATIENT
Start: 2018-10-24 | End: 2018-10-27 | Stop reason: HOSPADM

## 2018-10-23 RX ORDER — NITROGLYCERIN 0.4 MG/1
0.4 TABLET SUBLINGUAL
Status: DISCONTINUED | OUTPATIENT
Start: 2018-10-23 | End: 2018-10-27 | Stop reason: HOSPADM

## 2018-10-23 RX ORDER — MAGNESIUM SULFATE 100 %
4 CRYSTALS MISCELLANEOUS AS NEEDED
Status: DISCONTINUED | OUTPATIENT
Start: 2018-10-23 | End: 2018-10-27 | Stop reason: HOSPADM

## 2018-10-23 RX ORDER — ALPRAZOLAM 0.5 MG/1
0.5 TABLET ORAL
Status: DISCONTINUED | OUTPATIENT
Start: 2018-10-23 | End: 2018-10-27 | Stop reason: HOSPADM

## 2018-10-23 RX ORDER — ATORVASTATIN CALCIUM 40 MG/1
80 TABLET, FILM COATED ORAL
Status: DISCONTINUED | OUTPATIENT
Start: 2018-10-24 | End: 2018-10-27 | Stop reason: HOSPADM

## 2018-10-23 RX ORDER — DEXTROSE 50 % IN WATER (D50W) INTRAVENOUS SYRINGE
25-50 AS NEEDED
Status: DISCONTINUED | OUTPATIENT
Start: 2018-10-23 | End: 2018-10-27 | Stop reason: HOSPADM

## 2018-10-23 RX ADMIN — NITROGLYCERIN 0.5 INCH: 20 OINTMENT TOPICAL at 20:11

## 2018-10-23 RX ADMIN — FENTANYL CITRATE 25 MCG: 50 INJECTION, SOLUTION INTRAMUSCULAR; INTRAVENOUS at 20:15

## 2018-10-23 RX ADMIN — SODIUM CHLORIDE 500 ML: 900 INJECTION, SOLUTION INTRAVENOUS at 20:09

## 2018-10-23 NOTE — Clinical Note
Right ventricle lead sutured in place using 2-0 ethibond sutures. [employer] MRI SureScan [id number] CM.  SN: [id number]

## 2018-10-23 NOTE — Clinical Note
Contrast Dose Calculator:  
Patient's age: 72.  
Patient's sex: Female. Patient weight (kg) = 104. Creatinine level (mg/dL) = 1.13. Creatinine clearance (mL/min): 75.  
Contrast concentration (mg/mL) = 300. MACD = 300 mL. Max Contrast dose per Creatinine Cl calculator = 168.75 mL.

## 2018-10-23 NOTE — Clinical Note
TRANSFER - IN REPORT:  
 
Verbal report received from: Víctor Simental RN. Report consisted of patient's Situation, Background, Assessment and  
Recommendations(SBAR). Opportunity for questions and clarification was provided. Assessment completed upon patient's arrival to unit and care assumed. Patient transported with a Cardiac Cath Tech / Patient Care Tech.

## 2018-10-23 NOTE — Clinical Note
A venogram was performed on the left subclavian. Injected with single hand injection. Injection volume  = 15 mL.

## 2018-10-23 NOTE — Clinical Note
The pocket was closed in 2 layer(s) using continuous 4-0 vicryl sutures. Applied dressing(s): steri strips.

## 2018-10-23 NOTE — Clinical Note
TRANSFER - OUT REPORT:  
 
Verbal report given to: mary . Report consisted of patient's Situation, Background, Assessment and  
Recommendations(SBAR). Opportunity for questions and clarification was provided. Patient transported with a Registered Nurse. Patient transported to: ICU .

## 2018-10-24 ENCOUNTER — APPOINTMENT (OUTPATIENT)
Dept: NON INVASIVE DIAGNOSTICS | Age: 73
DRG: 243 | End: 2018-10-24
Attending: INTERNAL MEDICINE
Payer: MEDICARE

## 2018-10-24 LAB
ANION GAP SERPL CALC-SCNC: 5 MMOL/L (ref 3–18)
ATRIAL RATE: 43 BPM
ATRIAL RATE: 54 BPM
ATRIAL RATE: 56 BPM
BUN SERPL-MCNC: 22 MG/DL (ref 7–18)
BUN/CREAT SERPL: 21 (ref 12–20)
CALCIUM SERPL-MCNC: 8.2 MG/DL (ref 8.5–10.1)
CALCULATED P AXIS, ECG09: 93 DEGREES
CALCULATED P AXIS, ECG09: 94 DEGREES
CALCULATED R AXIS, ECG10: -41 DEGREES
CALCULATED R AXIS, ECG10: -44 DEGREES
CALCULATED R AXIS, ECG10: -48 DEGREES
CALCULATED T AXIS, ECG11: 21 DEGREES
CALCULATED T AXIS, ECG11: 24 DEGREES
CALCULATED T AXIS, ECG11: 27 DEGREES
CHLORIDE SERPL-SCNC: 114 MMOL/L (ref 100–108)
CO2 SERPL-SCNC: 27 MMOL/L (ref 21–32)
CREAT SERPL-MCNC: 1.05 MG/DL (ref 0.6–1.3)
DIAGNOSIS, 93000: NORMAL
EST. AVERAGE GLUCOSE BLD GHB EST-MCNC: 169 MG/DL
GLUCOSE BLD STRIP.AUTO-MCNC: 117 MG/DL (ref 70–110)
GLUCOSE BLD STRIP.AUTO-MCNC: 119 MG/DL (ref 70–110)
GLUCOSE BLD STRIP.AUTO-MCNC: 120 MG/DL (ref 70–110)
GLUCOSE BLD STRIP.AUTO-MCNC: 137 MG/DL (ref 70–110)
GLUCOSE SERPL-MCNC: 131 MG/DL (ref 74–99)
HBA1C MFR BLD: 7.5 % (ref 4.2–5.6)
P-R INTERVAL, ECG05: 342 MS
P-R INTERVAL, ECG05: 378 MS
POTASSIUM SERPL-SCNC: 3.8 MMOL/L (ref 3.5–5.5)
Q-T INTERVAL, ECG07: 462 MS
Q-T INTERVAL, ECG07: 470 MS
Q-T INTERVAL, ECG07: 510 MS
QRS DURATION, ECG06: 118 MS
QRS DURATION, ECG06: 122 MS
QRS DURATION, ECG06: 134 MS
QTC CALCULATION (BEZET), ECG08: 397 MS
QTC CALCULATION (BEZET), ECG08: 445 MS
QTC CALCULATION (BEZET), ECG08: 455 MS
SODIUM SERPL-SCNC: 146 MMOL/L (ref 136–145)
TROPONIN I SERPL-MCNC: 0.02 NG/ML (ref 0–0.04)
TROPONIN I SERPL-MCNC: 0.02 NG/ML (ref 0–0.04)
TROPONIN I SERPL-MCNC: <0.02 NG/ML (ref 0–0.04)
VENTRICULAR RATE, ECG03: 43 BPM
VENTRICULAR RATE, ECG03: 48 BPM
VENTRICULAR RATE, ECG03: 56 BPM

## 2018-10-24 PROCEDURE — 36415 COLL VENOUS BLD VENIPUNCTURE: CPT | Performed by: HOSPITALIST

## 2018-10-24 PROCEDURE — 74011250637 HC RX REV CODE- 250/637: Performed by: PHYSICIAN ASSISTANT

## 2018-10-24 PROCEDURE — 93005 ELECTROCARDIOGRAM TRACING: CPT

## 2018-10-24 PROCEDURE — 93308 TTE F-UP OR LMTD: CPT

## 2018-10-24 PROCEDURE — 82962 GLUCOSE BLOOD TEST: CPT

## 2018-10-24 PROCEDURE — 84484 ASSAY OF TROPONIN QUANT: CPT | Performed by: HOSPITALIST

## 2018-10-24 PROCEDURE — 74011636637 HC RX REV CODE- 636/637: Performed by: HOSPITALIST

## 2018-10-24 PROCEDURE — 74011250636 HC RX REV CODE- 250/636: Performed by: HOSPITALIST

## 2018-10-24 PROCEDURE — 77030037878 HC DRSG MEPILEX >48IN BORD MOLN -B

## 2018-10-24 PROCEDURE — 80048 BASIC METABOLIC PNL TOTAL CA: CPT | Performed by: HOSPITALIST

## 2018-10-24 PROCEDURE — 74011250637 HC RX REV CODE- 250/637: Performed by: HOSPITALIST

## 2018-10-24 PROCEDURE — 65660000001 HC RM ICU INTERMED STEPDOWN

## 2018-10-24 RX ORDER — HYDRALAZINE HYDROCHLORIDE 25 MG/1
25 TABLET, FILM COATED ORAL
Status: DISCONTINUED | OUTPATIENT
Start: 2018-10-24 | End: 2018-10-27 | Stop reason: HOSPADM

## 2018-10-24 RX ADMIN — SODIUM CHLORIDE 50 ML/HR: 900 INJECTION, SOLUTION INTRAVENOUS at 14:38

## 2018-10-24 RX ADMIN — ALPRAZOLAM 0.5 MG: 0.5 TABLET ORAL at 22:00

## 2018-10-24 RX ADMIN — GABAPENTIN 300 MG: 300 CAPSULE ORAL at 00:00

## 2018-10-24 RX ADMIN — HYDROCHLOROTHIAZIDE 25 MG: 25 TABLET ORAL at 10:49

## 2018-10-24 RX ADMIN — INSULIN GLARGINE 12 UNITS: 100 INJECTION, SOLUTION SUBCUTANEOUS at 10:17

## 2018-10-24 RX ADMIN — HYDRALAZINE HYDROCHLORIDE 25 MG: 25 TABLET, FILM COATED ORAL at 22:00

## 2018-10-24 RX ADMIN — SODIUM CHLORIDE 50 ML/HR: 900 INJECTION, SOLUTION INTRAVENOUS at 00:00

## 2018-10-24 RX ADMIN — ENOXAPARIN SODIUM 40 MG: 100 INJECTION SUBCUTANEOUS at 00:06

## 2018-10-24 RX ADMIN — GABAPENTIN 300 MG: 300 CAPSULE ORAL at 10:49

## 2018-10-24 RX ADMIN — CLOPIDOGREL BISULFATE 75 MG: 75 TABLET ORAL at 10:48

## 2018-10-24 RX ADMIN — ATORVASTATIN CALCIUM 80 MG: 40 TABLET, FILM COATED ORAL at 00:00

## 2018-10-24 RX ADMIN — ASPIRIN 325 MG ORAL TABLET 325 MG: 325 PILL ORAL at 10:48

## 2018-10-24 RX ADMIN — ESCITALOPRAM OXALATE 10 MG: 10 TABLET ORAL at 10:48

## 2018-10-24 RX ADMIN — ACETAMINOPHEN 1000 MG: 500 TABLET, FILM COATED ORAL at 22:00

## 2018-10-24 RX ADMIN — ACETAMINOPHEN 1000 MG: 500 TABLET, FILM COATED ORAL at 00:00

## 2018-10-24 RX ADMIN — ATORVASTATIN CALCIUM 80 MG: 40 TABLET, FILM COATED ORAL at 22:00

## 2018-10-24 RX ADMIN — GABAPENTIN 300 MG: 300 CAPSULE ORAL at 22:00

## 2018-10-24 RX ADMIN — ALPRAZOLAM 0.5 MG: 0.5 TABLET ORAL at 00:00

## 2018-10-24 RX ADMIN — GABAPENTIN 300 MG: 300 CAPSULE ORAL at 17:44

## 2018-10-24 NOTE — PROGRESS NOTES
Problem: Falls - Risk of 
Goal: *Absence of Falls Document [facility] Fall Risk and appropriate interventions in the flowsheet. Outcome: Progressing Towards Goal 
Fall Risk Interventions: 
Mobility Interventions: Bed/chair exit alarm, Communicate number of staff needed for ambulation/transfer Medication Interventions: Evaluate medications/consider consulting pharmacy, Bed/chair exit alarm Elimination Interventions: Call light in reach, Bed/chair exit alarm, Patient to call for help with toileting needs, Toileting schedule/hourly rounds History of Falls Interventions: Bed/chair exit alarm, Investigate reason for fall

## 2018-10-24 NOTE — PROGRESS NOTES
conducted an Initial consultation and Spiritual Assessment for Damir Rasheed, who is a 72 y.o.,female. Patients Primary Language is: [place]. According to the patients EMR Jewish Affiliation is: Mosque. The reason the Patient came to the hospital is:  
Patient Active Problem List  
 Diagnosis Date Noted  Mobitz type 2 second degree atrioventricular block 10/23/2018  Advance care planning 06/04/2018  Obesity, morbid [address] 03/29/2018  Type 2 diabetes with nephropathy ([facility] 03/29/2018  Type 2 diabetes mellitus with diabetic neuropathy ([facility] 03/29/2018  Stroke ([facility] 03/20/2018  Chest pain 12/08/2014  Uncontrolled diabetes mellitus ([facility] 03/25/2014  Diabetes  Hypertension  Dyslipidemia  Coronary artery disease The  provided the following Interventions: 
Initiated a relationship of care and support. Explored issues of sejal, belief, and spirituality. Patient confirmed her Hoahaoism affiliation as Horacebetsy [place]. She worships at [employer] [employer] in [place]. Listened empathically to patient. She expressed some worry about this \"new\" situation. She had a stroke and a heart attack before, but never a low heart rate. Provided education about what chaplains do. Offered assurance of prayer. Chart reviewed. The following outcomes were achieved: 
Patient shared limited information about her medical narrative and spiritual beliefs. Patient processed feeling about current hospitalization. Patient expressed gratitude for the 's visit. Assessment: 
Patient does not have any Hoahaoism or cultural needs that will affect patients preferences in health care. Patient did not indicate any other spiritual or Hoahaoism issues which require Spiritual Care Services interventions at this time. Plan: 
Chaplains will continue to follow and will provide pastoral care as needed or requested.  recommends bedside caregivers page  on duty if patient shows signs of acute spiritual or emotional distress. The Rev. Mor Blunt [address] Spiritual Care Services [address] [facility] [phone] / [facility] [phone]

## 2018-10-24 NOTE — ED NOTES
Raf Mishra called to bedside due to patient becoming bradycardic as low as 43. Verbal order for repeat EKG in 15 minutes. Primary nurse Oleg Vega, Rn notified with verbal readback

## 2018-10-24 NOTE — H&P
Medicine History and Physical 
Patient: Michel Brittle   Age:  72 y.o. Assessment Principal Problem: Mobitz type 2 second degree atrioventricular block (10/23/2018) Active Problems: Hypertension () Dyslipidemia () Coronary artery disease () Overview: Cx - 2.75 x 33mm XIENCE 10/13 Chest pain (12/8/2014) Type 2 diabetes with nephropathy ([address] (3/29/2018) Plan 1)  Mobitz II 2nd degree 
 - dropped ventricular complex noted followed by normal complexes that appears to indicate mobitz II 
 - I have asked ED to consult cardiology to make them aware - watch in step down on tele 
 - daily EKG 
 
2)  CP 
 - trop x 3 
 - telemetry - prn nitro - cards consulted, possible cath tomorrow 
 - non compliant with home meds - previous stent 3)  DM 
 - SSI, lantus 4)  HTN/HLD 
 - home meds. DISPO 
-Pt to be admitted  at this time for reasons addressed above, continued hospitalization for ongoing assessment and treatment indicated Anticipated Date of Discharge: 3 days Anticipated Disposition (home, SNF) : home Chief Complaint:  
Chief Complaint Patient presents with  Chest Pain HPI:  
Michel Brittle is a 72y.o. year old female who presents with  Chest pain. Hx of CAD with stenting, DM, HLD, HTN. Patient states the chest pain was pressure like and continues to be there even on interview now. It started around 2 pm with headache. No diaphoresis, N/V or SOB. She has had MI in the past with stent approximately 2013 and seems to be rather non compliant with medications Review of Systems - positive responses in bold type Constitutional: Negative for fever, chills, diaphoresis and unexpected weight change. HENT: Negative for ear pain, congestion, sore throat, rhinorrhea, drooling, trouble swallowing, neck pain and tinnitus. Eyes: Negative for photophobia, pain, redness and visual disturbance. Respiratory: negative for shortness of breath, cough, choking, chest tightness, wheezing or stridor. Cardiovascular: Negative for chest pain, palpitations and leg swelling. Gastrointestinal: Negative for nausea, vomiting, abdominal pain, diarrhea, constipation, blood in stool, abdominal distention and anal bleeding. Genitourinary: Negative for dysuria, urgency, frequency, hematuria, flank pain and difficulty urinating. Musculoskeletal: Negative for back pain and arthralgias. Skin: Negative for color change, rash and wound. Neurological: Negative for dizziness, seizures, syncope, speech difficulty, light-headedness or headaches. Hematological: Does not bruise/bleed easily. Psychiatric/Behavioral: Negative for suicidal ideas, hallucinations, behavioral problems, self-injury or agitation Past Medical History: 
Past Medical History:  
Diagnosis Date  Arthritis  Bilateral cataracts   
 sees Dr. Lennie Dominguez  COPD  Coronary artery disease Cx - 2.75 x 33mm XIENCE 10/13  Diabetes  Dyslipidemia  Hypertension  MI (myocardial infarction) ([facility]  Mobitz type 2 second degree atrioventricular block 10/23/2018  Noncompliance  Obesity  TIA (transient ischemic attack) 3/20/2018 Past Surgical History: 
Past Surgical History:  
Procedure Laterality Date  HX CORONARY STENT PLACEMENT  2013 Family History: 
Family History Problem Relation Age of Onset 26 [facility] Arthritis-osteo Mother  Heart Disease Mother  Alcohol abuse Neg Hx  Asthma Neg Hx  Bleeding Prob Neg Hx  Cancer Neg Hx  Diabetes Neg Hx  Elevated Lipids Neg Hx   
 Headache Neg Hx  Hypertension Neg Hx  Lung Disease Neg Hx  Migraines Neg Hx  Psychiatric Disorder Neg Hx  Stroke Neg Hx  Mental Retardation Neg Hx Social History: 
Social History Socioeconomic History  Marital status: UNKNOWN Spouse name: Not on file  Number of children: Not on file  Years of education: Not on file  Highest education level: Not on file Social Needs  Financial resource strain: Not on file  Food insecurity - worry: Not on file  Food insecurity - inability: Not on file  Transportation needs - medical: Not on file  Transportation needs - non-medical: Not on file Occupational History  Not on file Tobacco Use  Smoking status: Former Smoker Packs/day: 1.00 Years: 54.00 Pack years: 54.00 Start date: 2/10/2014  Smokeless tobacco: Never Used  Tobacco comment: pt states she uses e-cigs to stop smoking Substance and Sexual Activity  Alcohol use: No  
 Drug use: No  
 Sexual activity: Not Currently Other Topics Concern  Not on file Social History Narrative  Not on file Home Medications: 
Prior to Admission medications Medication Sig Start Date End Date Taking? Authorizing Provider NOVOLOG FLEXPEN U-100 INSULIN 100 unit/mL inpn INJECT 3 UNITS SUBCUTANEOUSLY BEFORE BREAKFAST, LUNCH AND DINNER (DISCARD AND BEGIN A NEW PEN AFTER 28 DAYS) 9/18/18   Kei Alcantara MD  
LANTUS SOLOSTAR U-100 INSULIN 100 unit/mL (3 mL) inpn INJECT 12 UNITS SUBCUTANEOUSLY DAILY  9/18/18   Kei Alcantara MD  
escitalopram oxalate (LEXAPRO) 10 mg tablet TAKE 1 TABLET BY MOUTH DAILY 7/7/18   Kei Alcantara MD  
gabapentin (NEURONTIN) 300 mg capsule Take 1 Cap by mouth three (3) times daily. 6/4/18   Kei Alcantara MD  
ALPRAZolam Waylan Davie) 0.5 mg tablet Take 1 Tab by mouth three (3) times daily as needed for Anxiety. Max Daily Amount: 1.5 mg. 6/4/18   Kei Alcantara MD  
atorvastatin (LIPITOR) 80 mg tablet Take 1 Tab by mouth nightly. 6/4/18   Kei Alcantara MD  
clopidogrel (PLAVIX) 75 mg tab Take 1 Tab by mouth daily. 6/4/18   Kei Alcantara MD  
hydroCHLOROthiazide (HYDRODIURIL) 25 mg tablet Take 1 Tab by mouth daily.  6/4/18   Kei Alcantara MD  
Lancets misc Use tid 5/8/18   Kei Alcantara MD  
 glucose blood VI test strips (ASCENSIA AUTODISC VI, ONE TOUCH ULTRA TEST VI) strip by Does Not Apply route See Admin Instructions. Use tid 5/8/18   Dalton Doyle MD  
Blood-Glucose Meter monitoring kit Test blood glucose 4 times a day. 4/19/18   Dalton Doyle MD  
Insulin Needles, Disposable, (LITE TOUCH INSULIN PEN NEEDLES) 31 gauge x 1/4\" ndle 1 Each by Does Not Apply route five (5) times daily. 3/29/18   Dalton Doyle MD  
aspirin (ASPIRIN) 325 mg tablet Take 1 Tab by mouth daily. 3/22/18   Lakeisha Murguia DO  
acetaminophen (TYLENOL EXTRA STRENGTH) 500 mg tablet Take 2 Tabs by mouth every six (6) hours as needed for Pain. 11/3/17   Jaja Gagnon MD  
glucose 4 gram chewable tablet Take 4 tablets by mouth as needed (hypoglcemia). 12/9/14   Dariel Lund MD  
nitroglycerin (NITROSTAT) 0.4 mg SL tablet 1 Tab by SubLINGual route every five (5) minutes as needed for Chest Pain. 2/27/14   Ubaldo Sequeira MD  
 
 
Allergies: 
No Known Allergies Physical Exam:  
 
Visit Vitals /66 Pulse (!) 52 Temp 98.3 °F (36.8 °C) Resp 15 Wt 108.9 kg (240 lb) SpO2 99% BMI 42.51 kg/m² Physical Exam: 
General appearance: alert, cooperative, no distress, appears stated age Head: Normocephalic, without obvious abnormality, atraumatic Neck: supple, trachea midline Lungs: clear to auscultation bilaterally Heart: irreg irreg, summer Abdomen: soft, non-tender. Bowel sounds normal. No masses,  no organomegaly Extremities: extremities normal, atraumatic, no cyanosis or edema Skin: Skin color, texture, turgor normal. No rashes or lesions Neurologic: Grossly normal 
 
Intake and Output: 
Current Shift:  No intake/output data recorded. Last three shifts:  No intake/output data recorded. Lab/Data Reviewed: 
CMP:  
Lab Results Component Value Date/Time   (H) 10/23/2018 08:08 PM  
 K 4.2 10/23/2018 08:08 PM  
  (H) 10/23/2018 08:08 PM  
 CO2 27 10/23/2018 08:08 PM  
 AGAP 6 10/23/2018 08:08 PM  
  (H) 10/23/2018 08:08 PM  
 BUN 20 (H) 10/23/2018 08:08 PM  
 CREA 1.22 10/23/2018 08:08 PM  
 GFRAA 53 (L) 10/23/2018 08:08 PM  
 GFRNA 43 (L) 10/23/2018 08:08 PM  
 CA 8.0 (L) 10/23/2018 08:08 PM  
 MG 1.9 10/23/2018 08:08 PM  
 ALB 2.6 (L) 10/23/2018 08:08 PM  
 TP 5.9 (L) 10/23/2018 08:08 PM  
 GLOB 3.3 10/23/2018 08:08 PM  
 AGRAT 0.8 10/23/2018 08:08 PM  
 SGOT 19 10/23/2018 08:08 PM  
 ALT 20 10/23/2018 08:08 PM  
 
CBC:  
Lab Results Component Value Date/Time WBC 5.5 10/23/2018 08:08 PM  
 HGB 11.7 (L) 10/23/2018 08:08 PM  
 HCT 35.9 10/23/2018 08:08 PM  
  (L) 10/23/2018 08:08 PM  
 
All Cardiac Markers in the last 24 hours:  
Lab Results Component Value Date/Time CPK 53 10/23/2018 08:08 PM  
 CKMB 1.4 10/23/2018 08:08 PM  
 CKND1 2.6 10/23/2018 08:08 PM  
 TROIQ <0.02 10/23/2018 08:08 PM  
 
 
 
 
Jamia Kim MD 
 
October 23, 2018

## 2018-10-24 NOTE — PROGRESS NOTES
[address] [place] [address] Hospitalist Division Inpatient Daily Progress Note Patient: Thania Krause MRN: [id number]  CSN: [id number] YOB: 1945  Age: 72 y.o. Sex: female DOA: 10/23/2018 LOS:  LOS: 1 day Chief Complaint:  Chest pain Interval History: Thania Krause is a 72y.o. year old female who presents with Chest pain. Hx of CAD with stenting, DM, HLD, HTN. Patient states the chest pain was pressure like and continues to be there even on interview now. It started around 2 pm with headache. No diaphoresis, N/V or SOB. She has had MI in the past with stent approximately 2013 and seems to be rather non compliant with medications. Patient admitted for further management. Cardiology consulted- recommend PPM. Echo ordered 10/24. Subjective:  
  
Denies chest pain or shortness Objective:  
  
Visit Vitals /64 Pulse (!) 46 Temp 98.9 °F (37.2 °C) Resp 14 Ht 5' 4\" (1.626 m) Wt 103.8 kg (228 lb 13.4 oz) SpO2 96% Breastfeeding? No  
BMI 39.28 kg/m² Physical Exam: 
General appearance: alert, cooperative, no distress, appears stated age Lungs: clear to auscultation bilaterally Heart: bradycardic rate and rhythm, S1, S2 normal, no murmur, click, rub or gallop Abdomen: soft, non tender, non distended. Normoactive bowel sounds Extremities: extremities normal, atraumatic, no cyanosis or edema Skin: Skin color, texture, turgor normal. No rashes or lesions Neurologic: Grossly normal 
PSY: Mood and affect normal, appropriately behaved Intake and Output: 
Current Shift:  10/24 0701 - 10/24 1900 In: 0 [P.O.:240; I.V.:350] Out: - Last three shifts:  10/22 1901 - 10/24 0700 In: 350 [I.V.:350] Out: 1100 [Urine:1100] Recent Results (from the past 24 hour(s)) EKG, 12 LEAD, INITIAL Collection Time: 10/23/18  7:53 PM  
Result Value Ref Range  Ventricular Rate 56 BPM  
 Atrial Rate 56 BPM  
 P-R Interval 378 ms QRS Duration 122 ms  
 Q-T Interval 462 ms QTC Calculation (Bezet) 445 ms Calculated P Axis 94 degrees Calculated R Axis -44 degrees Calculated T Axis 27 degrees Diagnosis Sinus bradycardia with marked sinus arrhythmia with 1st degree AV block with  
premature ventricular complexes or fusion complexes Left axis deviation Right bundle branch block Voltage criteria for left ventricular hypertrophy Abnormal ECG When compared with ECG of 21-MAR-2018 17:26, 
fusion complexes are now present 
premature ventricular complexes are now present 
premature atrial complexes are no longer present UT interval has increased CBC WITH AUTOMATED DIFF Collection Time: 10/23/18  8:08 PM  
Result Value Ref Range WBC 5.5 4.6 - 13.2 K/uL  
 RBC 4.13 (L) 4.20 - 5.30 M/uL  
 HGB 11.7 (L) 12.0 - 16.0 g/dL HCT 35.9 35.0 - 45.0 % MCV 86.9 74.0 - 97.0 FL  
 MCH 28.3 24.0 - 34.0 PG  
 MCHC 32.6 31.0 - 37.0 g/dL  
 RDW 12.8 11.6 - 14.5 % PLATELET 142 (L) 564 - 420 K/uL MPV 11.3 9.2 - 11.8 FL  
 NEUTROPHILS 67 40 - 73 % LYMPHOCYTES 19 (L) 21 - 52 % MONOCYTES 13 (H) 3 - 10 % EOSINOPHILS 1 0 - 5 % BASOPHILS 0 0 - 2 %  
 ABS. NEUTROPHILS 3.6 1.8 - 8.0 K/UL  
 ABS. LYMPHOCYTES 1.1 0.9 - 3.6 K/UL  
 ABS. MONOCYTES 0.7 0.05 - 1.2 K/UL  
 ABS. EOSINOPHILS 0.1 0.0 - 0.4 K/UL  
 ABS. BASOPHILS 0.0 0.0 - 0.1 K/UL  
 DF AUTOMATED METABOLIC PANEL, COMPREHENSIVE Collection Time: 10/23/18  8:08 PM  
Result Value Ref Range Sodium 146 (H) 136 - 145 mmol/L Potassium 4.2 3.5 - 5.5 mmol/L Chloride 113 (H) 100 - 108 mmol/L  
 CO2 27 21 - 32 mmol/L Anion gap 6 3.0 - 18 mmol/L Glucose 187 (H) 74 - 99 mg/dL BUN 20 (H) 7.0 - 18 MG/DL Creatinine 1.22 0.6 - 1.3 MG/DL  
 BUN/Creatinine ratio 16 12 - 20 GFR est AA 53 (L) >60 ml/min/1.73m2 GFR est non-AA 43 (L) >60 ml/min/1.73m2 Calcium 8.0 (L) 8.5 - 10.1 MG/DL  Bilirubin, total 0.3 0.2 - 1.0 MG/DL  
 ALT (SGPT) 20 13 - 56 U/L  
 AST (SGOT) 19 15 - 37 U/L Alk. phosphatase 116 45 - 117 U/L Protein, total 5.9 (L) 6.4 - 8.2 g/dL Albumin 2.6 (L) 3.4 - 5.0 g/dL Globulin 3.3 2.0 - 4.0 g/dL A-G Ratio 0.8 0.8 - 1.7 CARDIAC PANEL,(CK, CKMB & TROPONIN) Collection Time: 10/23/18  8:08 PM  
Result Value Ref Range CK 53 26 - 192 U/L  
 CK - MB 1.4 <3.6 ng/ml CK-MB Index 2.6 0.0 - 4.0 % Troponin-I, Qt. <0.02 0.0 - 0.045 NG/ML  
MAGNESIUM Collection Time: 10/23/18  8:08 PM  
Result Value Ref Range Magnesium 1.9 1.6 - 2.6 mg/dL HEMOGLOBIN A1C WITH EAG Collection Time: 10/23/18  8:08 PM  
Result Value Ref Range Hemoglobin A1c 7.5 (H) 4.2 - 5.6 % Est. average glucose 169 mg/dL EKG, 12 LEAD, SUBSEQUENT Collection Time: 10/23/18  8:27 PM  
Result Value Ref Range Ventricular Rate 43 BPM  
 Atrial Rate 43 BPM  
 QRS Duration 118 ms Q-T Interval 470 ms QTC Calculation (Bezet) 397 ms Calculated R Axis -48 degrees Calculated T Axis 24 degrees Diagnosis Atrial fibrillation with slow ventricular response Left axis deviation Right bundle branch block Moderate voltage criteria for LVH, may be normal variant Abnormal ECG When compared with ECG of 23-OCT-2018 19:53, Atrial fibrillation has replaced Sinus rhythm GLUCOSE, POC Collection Time: 10/23/18 10:54 PM  
Result Value Ref Range Glucose (POC) 164 (H) 70 - 110 mg/dL TROPONIN I Collection Time: 10/24/18 12:00 AM  
Result Value Ref Range Troponin-I, Qt. <0.02 0.0 - 0.045 NG/ML  
TROPONIN I Collection Time: 10/24/18  5:00 AM  
Result Value Ref Range Troponin-I, Qt. 0.02 0.0 - 3.816 NG/ML  
METABOLIC PANEL, BASIC Collection Time: 10/24/18  5:00 AM  
Result Value Ref Range Sodium 146 (H) 136 - 145 mmol/L Potassium 3.8 3.5 - 5.5 mmol/L Chloride 114 (H) 100 - 108 mmol/L  
 CO2 27 21 - 32 mmol/L Anion gap 5 3.0 - 18 mmol/L Glucose 131 (H) 74 - 99 mg/dL BUN 22 (H) 7.0 - 18 MG/DL Creatinine 1.05 0.6 - 1.3 MG/DL  
 BUN/Creatinine ratio 21 (H) 12 - 20 GFR est AA >60 >60 ml/min/1.73m2 GFR est non-AA 52 (L) >60 ml/min/1.73m2 Calcium 8.2 (L) 8.5 - 10.1 MG/DL  
EKG, 12 LEAD, SUBSEQUENT Collection Time: 10/24/18  6:48 AM  
Result Value Ref Range Ventricular Rate 48 BPM  
 Atrial Rate 54 BPM  
 P-R Interval 342 ms QRS Duration 134 ms Q-T Interval 510 ms QTC Calculation (Bezet) 455 ms Calculated P Axis 93 degrees Calculated R Axis -41 degrees Calculated T Axis 21 degrees Diagnosis Sinus bradycardia with marked sinus arrhythmia with 1st degree AV block Left axis deviation Right bundle branch block Moderate voltage criteria for LVH, may be normal variant Abnormal ECG When compared with ECG of 23-OCT-2018 20:27, Sinus rhythm has replaced Atrial fibrillation QT has lengthened GLUCOSE, POC Collection Time: 10/24/18  7:52 AM  
Result Value Ref Range Glucose (POC) 119 (H) 70 - 110 mg/dL GLUCOSE, POC Collection Time: 10/24/18 10:58 AM  
Result Value Ref Range Glucose (POC) 117 (H) 70 - 110 mg/dL TROPONIN I Collection Time: 10/24/18 11:35 AM  
Result Value Ref Range Troponin-I, Qt. 0.02 0.0 - 0.045 NG/ML Lab Results Component Value Date/Time Glucose 131 (H) 10/24/2018 05:00 AM  
 Glucose 187 (H) 10/23/2018 08:08 PM  
 Glucose 249 (H) 03/22/2018 03:47 AM  
 Glucose 304 (H) 03/19/2018 10:00 PM  
 Glucose 155 (H) 11/02/2017 11:30 PM  
  
 
Assessment/Plan:  
 
Patient Active Problem List  
Diagnosis Code  Diabetes E11.9  Hypertension I11.9  Dyslipidemia E78.5  Coronary artery disease I25.10  Uncontrolled diabetes mellitus ([facility] [address] E11.65  Chest pain R07.9  Stroke ([facility] [address] I63.9  Obesity, morbid ([facility] [address] E66.01  
 Type 2 diabetes with nephropathy (HCC) E11.21  
 Type 2 diabetes mellitus with diabetic neuropathy (HCC) E11.40  Advance care planning Z71.89  
  Mobitz type 2 second degree atrioventricular block I44.1 A/P: 
Mobitz II 2nd degree 
- dropped ventricular complex noted followed by normal complexes that appears to indicate mobitz II 
- Cardiology following- appreciate assistance - Recommend PPM 
- Echo ordered 10/24             
  
CP 
- prn nitro 
- non compliant with home meds - previous stent 
  
DM 
- SSI  
- Lantus  
  
HTN/HLD 
- home meds   
  
DVT Prophylaxis - Lovenox [facility] Pager:  [phone] Office:  [phone]

## 2018-10-24 NOTE — ED PROVIDER NOTES
EMERGENCY DEPARTMENT HISTORY AND PHYSICAL EXAM 
 
Date: 10/23/2018 Patient Name: Davie Valerio History of Presenting Illness Chief Complaint Patient presents with  Chest Pain History Provided By: Patient Chief Complaint: chest pressure Duration: since 1400 Timing:  Acute Location: mid sternal 
Quality: Pressure Severity: Moderate Modifying Factors: hx of AMI 5 years ago, has a stent. Not regularly followed by cards. On plavix and baby ASA daily. Has not taken plavix today. States she was given Nitro with her MI but never uses it Associated Symptoms: denies diaphoresis, denies vomiting, denies SOB, +fatigue, + left hand tingling, headache that lasted about one hour that began at 1400, no photophobia, no abd pain, no back pain, no neck pain Additional History (Context): Davie Valerio is a 72 y.o. female with DM, HTN, CAD with stent 10/13, COPD, arthritis, cataracts, noncompliance, MI, obesity, CVA with residual left sided weakness who presents with C/O chest pressure that began at 1400 today. States that the pressure is midsternal, non radiating. States that it is constant and does not get worse with exertion. She denies associated NV, diaphoresis, neck, back or arm pain. Does reports associated left hand tingling. She has had an MI in 2013 with stent placement. Has not seen a cardiologist recently. She had a stroke in March 2018 and has residual left sided weakness, left leg is worse than left arm. Was given 324 mg ASA by medics on the way here. She states that the chest pressure feels similar to when she last had her heart attack. She is a diabetic and has HLD. She has HTN. She does not smoke. Has not taken her plavix today. Also reports a headache to the top of her head that also started at 1400. States that it resolved after one hour. Denies any new focal neuro deficit, blurry vision, photophobia. Headache is now resolved.   
 
PCP: Dalton Doyle MD 
 
 Current Outpatient Medications Medication Sig Dispense Refill  NOVOLOG FLEXPEN U-100 INSULIN 100 unit/mL inpn INJECT 3 UNITS SUBCUTANEOUSLY BEFORE BREAKFAST, LUNCH AND DINNER (DISCARD AND BEGIN A NEW PEN AFTER 28 DAYS) 15 mL 0  
 LANTUS SOLOSTAR U-100 INSULIN 100 unit/mL (3 mL) inpn INJECT 12 UNITS SUBCUTANEOUSLY DAILY  15 mL 0  
 escitalopram oxalate (LEXAPRO) 10 mg tablet TAKE 1 TABLET BY MOUTH DAILY 30 Tab 0  
 gabapentin (NEURONTIN) 300 mg capsule Take 1 Cap by mouth three (3) times daily. 270 Cap 3  
 ALPRAZolam (XANAX) 0.5 mg tablet Take 1 Tab by mouth three (3) times daily as needed for Anxiety. Max Daily Amount: 1.5 mg. 20 Tab 0  
 atorvastatin (LIPITOR) 80 mg tablet Take 1 Tab by mouth nightly. 90 Tab 3  clopidogrel (PLAVIX) 75 mg tab Take 1 Tab by mouth daily. 90 Tab 3  
 hydroCHLOROthiazide (HYDRODIURIL) 25 mg tablet Take 1 Tab by mouth daily. 30 Tab 2  Lancets misc Use tid 1 Package 11  
 glucose blood VI test strips (ASCENSIA AUTODISC VI, ONE TOUCH ULTRA TEST VI) strip by Does Not Apply route See Admin Instructions. Use tid 200 Strip 6  Blood-Glucose Meter monitoring kit Test blood glucose 4 times a day. 1 Kit 0  
 Insulin Needles, Disposable, (LITE TOUCH INSULIN PEN NEEDLES) 31 gauge x 1/4\" ndle 1 Each by Does Not Apply route five (5) times daily. 200 Pen Needle 6  
 aspirin (ASPIRIN) 325 mg tablet Take 1 Tab by mouth daily. 30 Tab 0  
 acetaminophen (TYLENOL EXTRA STRENGTH) 500 mg tablet Take 2 Tabs by mouth every six (6) hours as needed for Pain. 40 Tab 0  
 glucose 4 gram chewable tablet Take 4 tablets by mouth as needed (hypoglcemia). 100 tablet 1  
 nitroglycerin (NITROSTAT) 0.4 mg SL tablet 1 Tab by SubLINGual route every five (5) minutes as needed for Chest Pain. 1 Bottle 2 Past History Past Medical History: 
Past Medical History:  
Diagnosis Date  Arthritis  Bilateral cataracts   
 sees Dr. Anamaria Maradiaga  COPD  Coronary artery disease Cx - 2.75 x 33mm XIENCE 10/13  Diabetes  Dyslipidemia  Hypertension  MI (myocardial infarction) ([facility]  Mobitz type 2 second degree atrioventricular block 10/23/2018  Noncompliance  Obesity  TIA (transient ischemic attack) 3/20/2018 Past Surgical History: 
Past Surgical History:  
Procedure Laterality Date  HX CORONARY STENT PLACEMENT  2013 Family History: 
Family History Problem Relation Age of Onset 26 [facility] Arthritis-osteo Mother  Heart Disease Mother  Alcohol abuse Neg Hx  Asthma Neg Hx  Bleeding Prob Neg Hx  Cancer Neg Hx  Diabetes Neg Hx  Elevated Lipids Neg Hx   
 Headache Neg Hx  Hypertension Neg Hx  Lung Disease Neg Hx  Migraines Neg Hx  Psychiatric Disorder Neg Hx  Stroke Neg Hx  Mental Retardation Neg Hx Social History: 
Social History Tobacco Use  Smoking status: Former Smoker Packs/day: 1.00 Years: 54.00 Pack years: 54.00 Start date: 2/10/2014  Smokeless tobacco: Never Used  Tobacco comment: pt states she uses e-cigs to stop smoking Substance Use Topics  Alcohol use: No  
 Drug use: No  
 
 
Allergies: 
No Known Allergies Review of Systems Review of Systems Constitutional: Positive for fatigue. Negative for chills, diaphoresis and fever. Respiratory: Negative for chest tightness, shortness of breath and wheezing. Cardiovascular: Positive for chest pain. Negative for palpitations and leg swelling. Gastrointestinal: Negative for abdominal pain, constipation, diarrhea, nausea and vomiting. All other systems reviewed and are negative. Physical Exam  
 
Vitals:  
 10/23/18 1953 10/23/18 1958 10/23/18 2011 10/23/18 2120 BP:  126/57 123/58 168/66 Pulse: 67 (!) 50 (!) 52 (!) 52 Resp: 17 16  15 Temp:  98.3 °F (36.8 °C) SpO2: 98% 98%  99% Weight:  108.9 kg (240 lb) Physical Exam  
 Constitutional: She appears well-developed and well-nourished. No distress. HENT:  
Head: Normocephalic and atraumatic. Eyes: EOM are normal. Pupils are equal, round, and reactive to light. Neck: Neck supple. Cardiovascular: Normal rate and regular rhythm. Exam reveals no gallop and no friction rub. No murmur heard. Bilateral 1+ pitting edema to the legs. Patient states this is her baseline Pulmonary/Chest: Effort normal and breath sounds normal. No respiratory distress. She has no wheezes. She has no rales. She exhibits no tenderness. Abdominal: Soft. Bowel sounds are normal. She exhibits no distension and no mass. There is no tenderness. There is no rebound and no guarding. Musculoskeletal: Normal range of motion. She exhibits no tenderness or deformity. Lymphadenopathy:  
  She has no cervical adenopathy. Neurological:  
CN 2-12 intact, no pronator drift, normal finger to nose. No leg drift, heel to shin is difficulty for patient when running the left leg up the right shin -- she states this is her baseline. There is weakness in the left leg, again at its baseline. Hand  is 5/5 bilaterally. Patient reports Skin: Skin is warm and dry. No rash noted. She is not diaphoretic. Psychiatric: She has a normal mood and affect. Her behavior is normal.  
Nursing note and vitals reviewed. Diagnostic Study Results Labs - Recent Results (from the past 12 hour(s)) EKG, 12 LEAD, INITIAL Collection Time: 10/23/18  7:53 PM  
Result Value Ref Range Ventricular Rate 56 BPM  
 Atrial Rate 56 BPM  
 P-R Interval 378 ms QRS Duration 122 ms  
 Q-T Interval 462 ms QTC Calculation (Bezet) 445 ms Calculated P Axis 94 degrees Calculated R Axis -44 degrees Calculated T Axis 27 degrees Diagnosis Sinus bradycardia with marked sinus arrhythmia with 1st degree AV block with  
premature ventricular complexes or fusion complexes Left axis deviation Right bundle branch block Voltage criteria for left ventricular hypertrophy Abnormal ECG When compared with ECG of 21-MAR-2018 17:26, 
fusion complexes are now present 
premature ventricular complexes are now present 
premature atrial complexes are no longer present TX interval has increased CBC WITH AUTOMATED DIFF Collection Time: 10/23/18  8:08 PM  
Result Value Ref Range WBC 5.5 4.6 - 13.2 K/uL  
 RBC 4.13 (L) 4.20 - 5.30 M/uL  
 HGB 11.7 (L) 12.0 - 16.0 g/dL HCT 35.9 35.0 - 45.0 % MCV 86.9 74.0 - 97.0 FL  
 MCH 28.3 24.0 - 34.0 PG  
 MCHC 32.6 31.0 - 37.0 g/dL  
 RDW 12.8 11.6 - 14.5 % PLATELET 882 (L) 687 - 420 K/uL MPV 11.3 9.2 - 11.8 FL  
 NEUTROPHILS 67 40 - 73 % LYMPHOCYTES 19 (L) 21 - 52 % MONOCYTES 13 (H) 3 - 10 % EOSINOPHILS 1 0 - 5 % BASOPHILS 0 0 - 2 %  
 ABS. NEUTROPHILS 3.6 1.8 - 8.0 K/UL  
 ABS. LYMPHOCYTES 1.1 0.9 - 3.6 K/UL  
 ABS. MONOCYTES 0.7 0.05 - 1.2 K/UL  
 ABS. EOSINOPHILS 0.1 0.0 - 0.4 K/UL  
 ABS. BASOPHILS 0.0 0.0 - 0.1 K/UL  
 DF AUTOMATED METABOLIC PANEL, COMPREHENSIVE Collection Time: 10/23/18  8:08 PM  
Result Value Ref Range Sodium 146 (H) 136 - 145 mmol/L Potassium 4.2 3.5 - 5.5 mmol/L Chloride 113 (H) 100 - 108 mmol/L  
 CO2 27 21 - 32 mmol/L Anion gap 6 3.0 - 18 mmol/L Glucose 187 (H) 74 - 99 mg/dL BUN 20 (H) 7.0 - 18 MG/DL Creatinine 1.22 0.6 - 1.3 MG/DL  
 BUN/Creatinine ratio 16 12 - 20 GFR est AA 53 (L) >60 ml/min/1.73m2 GFR est non-AA 43 (L) >60 ml/min/1.73m2 Calcium 8.0 (L) 8.5 - 10.1 MG/DL Bilirubin, total 0.3 0.2 - 1.0 MG/DL  
 ALT (SGPT) 20 13 - 56 U/L  
 AST (SGOT) 19 15 - 37 U/L Alk. phosphatase 116 45 - 117 U/L Protein, total 5.9 (L) 6.4 - 8.2 g/dL Albumin 2.6 (L) 3.4 - 5.0 g/dL Globulin 3.3 2.0 - 4.0 g/dL A-G Ratio 0.8 0.8 - 1.7 CARDIAC PANEL,(CK, CKMB & TROPONIN) Collection Time: 10/23/18  8:08 PM  
Result Value Ref Range  CK 53 26 - 192 U/L  
 CK - MB 1.4 <3.6 ng/ml CK-MB Index 2.6 0.0 - 4.0 % Troponin-I, Qt. <0.02 0.0 - 0.045 NG/ML  
MAGNESIUM Collection Time: 10/23/18  8:08 PM  
Result Value Ref Range Magnesium 1.9 1.6 - 2.6 mg/dL EKG, 12 LEAD, SUBSEQUENT Collection Time: 10/23/18  8:27 PM  
Result Value Ref Range Ventricular Rate 43 BPM  
 Atrial Rate 43 BPM  
 QRS Duration 118 ms Q-T Interval 470 ms QTC Calculation (Bezet) 397 ms Calculated R Axis -48 degrees Calculated T Axis 24 degrees Diagnosis Atrial fibrillation with slow ventricular response Left axis deviation Right bundle branch block Moderate voltage criteria for LVH, may be normal variant Abnormal ECG When compared with ECG of 23-OCT-2018 19:53, Atrial fibrillation has replaced Sinus rhythm Radiologic Studies -  
XR CHEST PORT    (Results Pending) CT Results  (Last 48 hours) None CXR Results  (Last 48 hours) None Medical Decision Making I am the first provider for this patient. I reviewed the vital signs, available nursing notes, past medical history, past surgical history, family history and social history. Vital Signs-Reviewed the patient's vital signs. Pre-arrival EKG transmitted by medics: 
Interpreted by Dr. Chema Baker and myself at 6208 Rate 67 Rhythm:  Sinus rhythm with prolonged OK interval, RBBB, LVH 
 
EKG: 
#1: Interpreted by the EP, Dr. Chema Baker and Reba Washington 86 Time Interpreted:  5 Rate:  56 Rhythm: sinus bradycardia Interpretation: sinus arrhythmia with first degree AV block with premature ventricular fusions, left axis deviation, right bundle branch block. This is not significantly different to prior Comparison: 03/21/18 #2: Interpreted by Dr. Chema Baker and Reba Washington 86 Time Interpreted: 2030 Rate:  43 Rhythm:  Sinus bradycardia Interpretation: 2nd degree AV block;  Mobitz II 
     
Records Reviewed: Nursing Notes, Old Medical Records, Previous electrocardiograms, Ambulance Run Sheet, Previous Radiology Studies and Previous Laboratory Studies Procedures: 
Procedures Provider Notes (Medical Decision Making): Consult:  Discussed case with Dr. Jeremy Suárez. We reviewed her lastest EKG and he agrees about Mobitz Type II. She has a HEART score of 5; we will plan to admit her. Agrees with plan to place pads on patient. Consult:  Spoke to Dr. Hiren Breen. Reviewed trop, labs, EKG #1 and #2 here, CXR. Aware patient has pads on her Agrees with plan for admission; would like for me to call cards. Page out to cards. Consult:  MARIA GUADALUPE Abdullahi, sally. We discussed patient and her history. We discussed her troponins, HEART score. Bradycardia and Mobitz Type II on EKG, aware of ASA and Nitro give. She knows that patient has pads on and agrees. She would like for me to make the patient NPO after midnight in anticipation for a possible cath tomorrow. Updated Dr. Hiren Breen about this plan from cards. Raf Koenig Impression:  Chest pain, Mobitz type II, hyperglycemia, HEART Score of 5. Plan for admission to the hospitalist team with cards closely following. MARIA GUADALUPE Koenig 
 
 
MED RECONCILIATION: 
No current facility-administered medications for this encounter. Current Outpatient Medications Medication Sig  
 NOVOLOG FLEXPEN U-100 INSULIN 100 unit/mL inpn INJECT 3 UNITS SUBCUTANEOUSLY BEFORE BREAKFAST, LUNCH AND DINNER (DISCARD AND BEGIN A NEW PEN AFTER 28 DAYS)  LANTUS SOLOSTAR U-100 INSULIN 100 unit/mL (3 mL) inpn INJECT 12 UNITS SUBCUTANEOUSLY DAILY  escitalopram oxalate (LEXAPRO) 10 mg tablet TAKE 1 TABLET BY MOUTH DAILY  gabapentin (NEURONTIN) 300 mg capsule Take 1 Cap by mouth three (3) times daily.  ALPRAZolam (XANAX) 0.5 mg tablet Take 1 Tab by mouth three (3) times daily as needed for Anxiety. Max Daily Amount: 1.5 mg.  
 atorvastatin (LIPITOR) 80 mg tablet Take 1 Tab by mouth nightly.  clopidogrel (PLAVIX) 75 mg tab Take 1 Tab by mouth daily.  hydroCHLOROthiazide (HYDRODIURIL) 25 mg tablet Take 1 Tab by mouth daily.  Lancets misc Use tid  glucose blood VI test strips (ASCENSIA AUTODISC VI, ONE TOUCH ULTRA TEST VI) strip by Does Not Apply route See Admin Instructions. Use tid  Blood-Glucose Meter monitoring kit Test blood glucose 4 times a day.  Insulin Needles, Disposable, (LITE TOUCH INSULIN PEN NEEDLES) 31 gauge x 1/4\" ndle 1 Each by Does Not Apply route five (5) times daily.  aspirin (ASPIRIN) 325 mg tablet Take 1 Tab by mouth daily.  acetaminophen (TYLENOL EXTRA STRENGTH) 500 mg tablet Take 2 Tabs by mouth every six (6) hours as needed for Pain.  glucose 4 gram chewable tablet Take 4 tablets by mouth as needed (hypoglcemia).  nitroglycerin (NITROSTAT) 0.4 mg SL tablet 1 Tab by SubLINGual route every five (5) minutes as needed for Chest Pain. Disposition: 
Admit Core Measures: 
 
Critical Care Time:  
Critical Care Time:  
I have spent 31 minutes of critical care time involved in lab review, consultations with specialist, family decision-making, and documentation. During this entire length of time I was immediately available to the patient. 
  
Critical Care: The reason for providing this level of medical care for this critically ill patient was due a critical illness that impaired one or more vital organ systems such that there was a high probability of imminent or life threatening deterioration in the patients condition. This care involved high complexity decision making to assess, manipulate, and support vital system functions, to treat this degreee vital organ system failure and to prevent further life threatening deterioration of the patients condition. For Hospitalized Patients: 
 
1. Hospitalization Decision Time: The decision to hospitalize the patient was made by Lucius Cuellar and MARIA GUADALUPE Tripathi on 10/23/18 at 2100 2. Aspirin: Aspirin was given (by medics, 324 mg) Diagnosis Clinical Impression: 1. Mobitz type 2 second degree atrioventricular block 2. Chest pain, unspecified type 3. Bradycardia

## 2018-10-24 NOTE — CONSULTS
[facility] - Consult Note    Date of  Admission: 10/23/2018  7:48 PM   Primary Care Physician:  Samuel Peterson MD    I saw, evaluated, interviewed and examined the patient personally. I agree with the findings and plan of care as documented below with PA-C note  Admitted with some chest pain and onand off dizziness  Finding of bradycardia, HD stable  Tele evidence of intermittent Type I and II second degree AVB with RBBB and LAFB  HR in 30s occasionally on telel monitor  On and off dizziness. DW EP service  and we both agree she will require PPM. Risk , benefit alternatives and complication of procedure discussed with patient and she is agreeable. Timing per EP service  ECHO today    Barbara Mathew MD       Assessment:     - Likely Symptomatic bradycardia: Intermittent Second degree type I and II noted on EKG and telemetry with RBBB  - Chest pain, negative cardiac enzymes x3  - CAD with LUZ MARIA to Cx and OM in 10/2013  - Echo 2018 with EF 75%, grade 2 DD  - Hx of CVA in March 2018: MRI at the time showed an acute posterior lateral right thalamic lacunar infarct.   - Diabetes Mellitus  - Hypertension  - Dyslipidemia  - Obesity     Plan:     - Patient with symptomatic summer and no reversible causes such as AV lalito blocking agents, permanent pacemaker is indicated, discussed with patient. Timing is per EP specialist practicing at [facility]  - Chest pain with negative cardiac enzymes, MI ruled out. Atypical sounding  - Echocardiogram this admission  - Adding Amlodipine for hypertension     History of Present Illness: This is a 72 y.o. female admitted for Mobitz type 2 second degree atrioventricular block. Patient complains of:  Dizziness    This is a 72year old female with a history of CVA, hypertension, diabetes and dyslipidemia that cardiology is seeing in consult due to chest pain and bradycardia/high grade heart block.  She has complaints of dizziness and falls when going from a seated position to standing. No overt syncope. She also complained of chest tightness, though this has been present for at least one year since her CVA. No shortness of breath, nausea, vomiting or diaphoresis. She does not believe this is the same as what she felt prior to her stenting. Since her CVA, she has chronic left arm numbness and tingling. She has chronic LE edema. No orthopnea, palpitations or PND. Cardiac risk factors: dyslipidemia, diabetes mellitus, obesity, sedentary life style, hypertension      Review of Symptoms:  Except as stated above include:  Constitutional:  +dizziness  Respiratory:  negative  Cardiovascular:  Per HPI  Gastrointestinal: negative  Genitourinary:  negative  Musculoskeletal:  Negative  Neurological:  Negative  Dermatological:  Negative  Endocrinological: Negative  Psychological:  Negative    A comprehensive review of systems was negative except for that written in the HPI.      Past Medical History:     Past Medical History:   Diagnosis Date    Arthritis     Bilateral cataracts     sees Dr. Esteban Eugene    COPD     Coronary artery disease     Cx - 2.75 x 33mm XIENCE 10/13     Diabetes     Dyslipidemia     Hypertension     MI [facility]myocardial infarction) ([facility] [address]     Mobitz type 2 second degree atrioventricular block 10/23/2018    Noncompliance     Obesity     TIA (transient ischemic attack) 3/20/2018         Social History:     Social History     Socioeconomic History    Marital status: UNKNOWN     Spouse name: Not on file    Number of children: Not on file    Years of education: Not on file    Highest education level: Not on file   Social Needs    Financial resource strain: Not on file    Food insecurity - worry: Not on file    Food insecurity - inability: Not on file   IES needs - medical: Not on file   [place] needs - non-medical: Not on file   Occupational History    Not on file   Tobacco Use    Smoking status: Former Smoker     Packs/day: 1.00 Years: 54.00     Pack years: 54.00     Start date: 2/10/2014    Smokeless tobacco: Never Used    Tobacco comment: pt states she uses e-cigs to stop smoking   Substance and Sexual Activity    Alcohol use: No    Drug use: No    Sexual activity: Not Currently   Other Topics Concern    Not on file   Social History Narrative    Not on file        Family History:     Family History   Problem Relation Age of Onset    Arthritis-osteo Mother     Heart Disease Mother     Alcohol abuse Neg Hx     Asthma Neg Hx     Bleeding Prob Neg Hx     Cancer Neg Hx     Diabetes Neg Hx     Elevated Lipids Neg Hx     Headache Neg Hx     Hypertension Neg Hx     Lung Disease Neg Hx     Migraines Neg Hx     Psychiatric Disorder Neg Hx     Stroke Neg Hx     Mental Retardation Neg Hx         Medications:   No Known Allergies     Current Facility-Administered Medications   Medication Dose Route Frequency    acetaminophen (TYLENOL) tablet 1,000 mg  1,000 mg Oral Q6H PRN    ALPRAZolam (XANAX) tablet 0.5 mg  0.5 mg Oral TID PRN    aspirin tablet 325 mg  325 mg Oral DAILY    atorvastatin (LIPITOR) tablet 80 mg  80 mg Oral QHS    clopidogrel (PLAVIX) tablet 75 mg  75 mg Oral DAILY    escitalopram oxalate (LEXAPRO) tablet 10 mg  10 mg Oral DAILY    gabapentin (NEURONTIN) capsule 300 mg  300 mg Oral TID    hydroCHLOROthiazide (HYDRODIURIL) tablet 25 mg  25 mg Oral DAILY    insulin glargine (LANTUS) injection 12 Units  12 Units SubCUTAneous DAILY    nitroglycerin (NITROSTAT) tablet 0.4 mg  0.4 mg SubLINGual Q5MIN PRN    0.9% sodium chloride infusion  50 mL/hr IntraVENous CONTINUOUS    ondansetron (ZOFRAN) injection 4 mg  4 mg IntraVENous Q4H PRN    enoxaparin (LOVENOX) injection 40 mg  40 mg SubCUTAneous Q24H    insulin lispro (HUMALOG) injection   SubCUTAneous AC&HS    glucose chewable tablet 16 g  4 Tab Oral PRN    glucagon (GLUCAGEN) injection 1 mg  1 mg IntraMUSCular PRN    dextrose (D50W) injection syrg 12.5-25 g  25-50 mL IntraVENous PRN         Physical Exam:     Visit Vitals  /66   Pulse (!) 50   Temp 98.6 °F (37 °C)   Resp 20   Ht 5' 4\" (1.626 m)   Wt 228 lb 13.4 oz (103.8 kg)   SpO2 97%   Breastfeeding? No   BMI 39.28 kg/m²     BP Readings from Last 3 Encounters:   10/24/18 166/66   06/04/18 122/60   03/29/18 122/60     Pulse Readings from Last 3 Encounters:   10/24/18 (!) 50   06/04/18 (!) 58   03/29/18 61     Wt Readings from Last 3 Encounters:   10/24/18 228 lb 13.4 oz (103.8 kg)   06/04/18 234 lb (106.1 kg)   03/29/18 231 lb (104.8 kg)       General:  alert, cooperative, no distress, appears stated age  Neck:  nontender, no JVD  Lungs:  clear to auscultation bilaterally  Heart:  bradycardic rate and regular rhythm, S1, S2 normal, no murmur, click, rub or gallop  Abdomen:  abdomen is soft without significant tenderness, masses, organomegaly or guarding  Extremities:  Edema 1+ bilateral LE's  Skin: Warm and dry.  no hyperpigmentation, vitiligo, or suspicious lesions  Neuro: alert, oriented x3, affect appropriate  Psych: non focal     Data Review:     Recent Labs     10/23/18  2008   WBC 5.5   HGB 11.7*   HCT 35.9   *     Recent Labs     10/24/18  0500 10/23/18  2008   * 146*   K 3.8 4.2   * 113*   CO2 27 27   * 187*   BUN 22* 20*   CREA 1.05 1.22   CA 8.2* 8.0*   MG  --  1.9   ALB  --  2.6*   SGOT  --  19   ALT  --  20       Results for orders placed or performed during the hospital encounter of 10/23/18   EKG, 12 LEAD, INITIAL   Result Value Ref Range    Ventricular Rate 56 BPM    Atrial Rate 56 BPM    P-R Interval 378 ms    QRS Duration 122 ms    Q-T Interval 462 ms    QTC Calculation (Bezet) 445 ms    Calculated P Axis 94 degrees    Calculated R Axis -44 degrees    Calculated T Axis 27 degrees    Diagnosis       Sinus bradycardia with marked sinus arrhythmia with 1st degree AV block with   premature ventricular complexes or fusion complexes  Left axis deviation  Right bundle branch block  Voltage criteria for left ventricular hypertrophy  Abnormal ECG  When compared with ECG of 21-MAR-2018 17:26,  fusion complexes are now present  premature ventricular complexes are now present  premature atrial complexes are no longer present  NY interval has increased         All Cardiac Markers in the last 24 hours:    Lab Results   Component Value Date/Time    CPK 53 10/23/2018 08:08 PM    CKMB 1.4 10/23/2018 08:08 PM    CKND1 2.6 10/23/2018 08:08 PM    TROIQ 0.02 10/24/2018 11:35 AM    TROIQ 0.02 10/24/2018 05:00 AM    TROIQ <0.02 10/24/2018 12:00 AM    TROIQ <0.02 10/23/2018 08:08 PM       Last Lipid:    Lab Results   Component Value Date/Time    Cholesterol, total 279 (H) 03/20/2018 08:32 AM    HDL Cholesterol 52 03/20/2018 08:32 AM    LDL, calculated 188.4 (H) 03/20/2018 08:32 AM    Triglyceride 193 (H) 03/20/2018 08:32 AM    CHOL/HDL Ratio 5.4 (H) 03/20/2018 08:32 AM       Signed By: Adrienne Cardenas.  Gracia Bain     October 24, 2018

## 2018-10-24 NOTE — PROGRESS NOTES
Reason for Admission:   Mobitz type 2 second degree atrioventricular block RRAT Score: 32 Resources/supports as identified by patient/family:    
             
Top Challenges facing patient (as identified by patient/family and CM): Finances/Medication cost?    [employer] Transportation? Will need transportaiton assistance Support system or lack thereof? Niece Lester Fu [phone] Living arrangements? Alone with 2 cats Self-care/ADLs/Cognition? Independent AAO x3 Current Advanced Directive/Advance Care Plan:  none Plan for utilizing home health:    As indicated Likelihood of readmission:  moderate Transition of Care Plan:              
Chart reviewed and pt verified demographics. She has [employer]  HMO insurance. She sees Dr Layla Pringle for her primary care needs. Demogpahic information verified. She lives alone. Her next door neighbor and good friend Luis Jenkins [phone] will be checking in on her after dc. Her Niece Lester Fu [phone] or work   pt says she is the [employer] . She says she lives alone and independent with ADL. She states she never had home health or any DME. Transition plan is home, possibly with Home Health PT/OT if needed. Care Management Interventions PCP Verified by CM: Yes 
Palliative Care Criteria Met (RRAT>21 & CHF Dx)?: No 
Mode of Transport at Discharge: Other (see comment)(neice to assist) Transition of Care Consult (CM Consult): Discharge Planning MyChart Signup: No 
Discharge Durable Medical Equipment: No 
Health Maintenance Reviewed: Yes Physical Therapy Consult: No 
Occupational Therapy Consult: No 
Speech Therapy Consult: No 
Current Support Network: Lives Alone Confirm Follow Up Transport: Other (see comment) Plan discussed with Pt/Family/Caregiver:  Yes 
 [employer] Information Provided?: No 
Discharge Location Discharge Placement: Other:(home with hh)

## 2018-10-24 NOTE — PROGRESS NOTES
Physical Exam  
Skin:  
 
  
0800 Assessment done. Pt is alert; oriented. Denies any shortness of breath, no chest pain. AVB 2nd degree on the monitor. Lungs clear to auscultation. Abdomen soft; non tender. Kept NPO for possible procedure. Due to void. Skin: as above. 
 
0900 Pt is seen by MARIA GUADALUPE Weiner of Cardiology Dept. She reviewed pt's heart rhythm Aware of pt's high blood pressure and electrolytes K+ level. 1200 Reassessment done. Pt experienced some on and off  Dizziness. 1400 Visited by her friends and relatives. [phone] Pt still with elevated BP. Cardiology Chelsie Neves with order received. 300 [facility] 100% of dinner. 1920 Bedside and Verbal shift change report given to 5 [facility] (oncoming nurse) by Newton Lujan RN 
 (offgoing nurse). Report included the following information SBAR, Kardex, Intake/Output, MAR and Cardiac Rhythm SB 1 degree AVB to 2nd degree AVB-Type2.

## 2018-10-24 NOTE — PROGRESS NOTES
Physical Exam  
Skin: Skin is warm and dry. Abrasion and bruising noted. Verbal transfer report was given to me, Silvano Hair RN  (oncoming night shift nurse), by Stefany Rowley RN (ED). Report included the following information:  SBAR, kardex, consultations, hemodynamic monitoring, intake/output, MAR, lab results, VS trends, cardiac rhythm noted NSR/SB w/ first degree AVB/BBB/A fib. Skin, neuro, respiratory status, and order verification have been dually noted and verified at the bedside with:   Barby Gonzalez RN                                            
                      
ICU Nurse's Note: 
0622: Received care of pt via stretcher transfer from ED. Pt is alert x 4. Pt independently moves all extremities, though she is unsteady on her feet and requires one assist with toileting ADLs. Neurological: as noted in assessment, residual left sided weakness from previous CVA March 2018 Cardiovascular: NSR/SB w/ first degree AVB/A fib/PVC's on the monitor. Pulmonary: breath sounds clear, saturations maintained at 99% on room air. Pt denies SOB or dyspnea GI/: Abdomen is obese, soft, non-distended, + bowel sounds. Last BM noted prior to admission 10/22/2018. Pt voids on bedpan or BSC with assist, adequate urine output noted. Pt will be NPO after midnight for potential Cardiac Catheterization. IVF/Critical gtts: IV maintenance fluid Skin: as noted above 
 
0000: Pt is NPO at this time for possible add on cardiac catheterization pending Cardiology eval. Pt states understanding. Bed is locked and in lowest position, call bell and bedside table are within reach. Exit alarm activated. Will continue to monitor. 0400: Pt tolerated assist with toileting and bathing ADL's. She denies c/o pain, discomfort, or chest pressure. HR continues to trend SB. 0645: Scheduled EKG completed at the bedside. Pt tolerated procedure without incident.  EKG indicated Sinus Bradycardia w/ markers for sinus arrythmia with rist degree AVB, right BBB with occasional PVC's. Pt remains NPO at this time.  
0710: Bedside change of shift report given to: Nunu Austin RN

## 2018-10-24 NOTE — PROGRESS NOTES
*ATTENTION:  This note has been created by a medical student for educational purposes only. Please do not refer to the content of this note for clinical decision-making, billing, or other purposes. Please see attending physicians note to obtain clinical information on this patient. * Student Progress Note Please refer to attendings daily rounding note for full details Patient: Leela Tucker MRN: [id number]  CSN: [id number] YOB: 1945  Age: 72 y.o. Sex: female DOA: 10/23/2018 LOS:  LOS: 1 day Chief Complaint:  Chest pain Subjective: HPI:  
Eliezer Morataya is a 73 y/o female with a history of CAD with stenting, DM, HLD, HTN that presented to the ED with chest pain. She described the chest pain as a constant pressure and started with a headache. Denies diaphoresis, N/V, or SOB. Last MI with stenting was in 2013, patient does not seem to be compliant with medications since and has not recently since a cardiologist outpatient. Patient did have a CVA in November 2017 and has residual L sided weakness. She stated when she had her stroke she had a chest pain, and it has not completely resolved since that original occurrence. PMH:  
CAD 
CVA 2018 MI with stenting DM 
HLD 
HTN Allergies: NKA 
 
ROS: 
Patient denies any pain. She states the chest pressure is still present but less. Objective:  
  
Visit Vitals /56 Pulse (!) 54 Temp 98.6 °F (37 °C) Resp 15 Ht 5' 4\" (1.626 m) Wt 103.8 kg (228 lb 13.4 oz) SpO2 96% Breastfeeding? No  
BMI 39.28 kg/m² Physical Exam: 
General: Well appearing, well nourished. Alert and cooperative, in NAD. Appropriate mood and affect. Skin: Warm, dry, and intact without lesions, erythema, bruising, or pallor. HEENT: Normocephalic, atraumatic. PERRL, EOMI, vision grossly intact. Hearing grossly intact. Neck supple, without lymphadenopathy or thyromegaly. Trachea midline. Cardiovascular: RRR. No peripheral edema, cyanosis, or pallor. Radial and dorsalis pedis pulses strong and equal bilaterally. Capillary refill <2 seconds. Respiratory: CTAB without wheezes. Abdominal: Soft, non-distended, non-tender. Normoactive bowel sounds. No guarding or rebound tenderness. No CVA tenderness. Musculoskeletal: Normal muscular development. ROM spine and extremities grossly intact. Neurologic: CN II-XII intact. RUE and RLE are weaker with /pushes/pulls. No drift present of any extremity. I have reviewed the patient's Labs and Radiology studies. Assessment/Plan:  
Linking Statement: 
 
1)Mobitz type II 2nd degree AV block  
-cardiology onboard, pacemaker most likely indicated 2)chest pain 
-more of a chest pressure, ongoing but waxes and wanes since her stroke in 2017 
-nitro PRN 
-negative cardiac enzymes 3) CAD 
-plavix 75mg PO daily 4) HLD 
- mg PO daily  
-lipitor 80mg PO every bedtime 5) HTN 
-hctz 25mg PO daily 6) DM type 2 
-SSI 
-Lantus 12 units daily 7) DVT prophylaxis  
-lovenox Jordy Kalata 10/24/2018 10:18 AM

## 2018-10-24 NOTE — PROGRESS NOTES
Problem: Falls - Risk of 
Goal: *Absence of Falls Document Lavonia Kappa Fall Risk and appropriate interventions in the flowsheet. Outcome: Progressing Towards Goal 
Fall Risk Interventions: 
 gripper socks Call bell in reach Side rails up x3 Exit alarm activated

## 2018-10-24 NOTE — PROGRESS NOTES
Problem: Falls - Risk of 
Goal: *Absence of Falls Document Nita Saavedra Fall Risk and appropriate interventions in the flowsheet. Outcome: Progressing Towards Goal 
Fall Risk Interventions: 
Mobility Interventions: Bed/chair exit alarm, Communicate number of staff needed for ambulation/transfer Medication Interventions: Evaluate medications/consider consulting pharmacy, Bed/chair exit alarm Elimination Interventions: Call light in reach, Bed/chair exit alarm, Patient to call for help with toileting needs, Toileting schedule/hourly rounds History of Falls Interventions: Bed/chair exit alarm, Investigate reason for fall

## 2018-10-24 NOTE — ED TRIAGE NOTES
Pt arrived by EMS with c/o chest pressure, headache and left arm tingling with elbow pain since 1400. Dr. Stefany Freed notified immediately and MARIA GUADALUPE Carvajal came to bedside to evaluate for possible code S. No code S called at this time.

## 2018-10-24 NOTE — ED NOTES
TRANSFER - ED to INPATIENT REPORT: 
 
SBAR report made available to receiving floor on this patient being transferred to  [phone] /[phone])  for routine progression of care Admitting diagnosis Mobitz type 2 second degree atrioventricular block Information from the following report(s) SBAR, ED Summary, MAR and Cardiac Rhythm 1st AV block with BBB was made available to receiving floor. Lines:  
Peripheral IV 10/23/18 Right Wrist (Active) Phlebitis Assessment 0 10/23/2018  8:08 PM  
Infiltration Assessment 0 10/23/2018  8:08 PM  
Dressing Status Clean, dry, & intact 10/23/2018  8:08 PM  
Dressing Type Transparent 10/23/2018  8:08 PM  
Hub Color/Line Status Pink 10/23/2018  8:08 PM  
Action Taken Blood drawn 10/23/2018  8:08 PM  
Alcohol Cap Used No 10/23/2018  8:08 PM  
  
 
Medication list updated today Opportunity for questions and clarification was provided. Patient is oriented to time, place, person and situation Patient is  continent and non-ambulatory Valuables transported with patient Patient transported with: 
 Monitor Registered Nurse

## 2018-10-25 LAB
ANION GAP SERPL CALC-SCNC: 5 MMOL/L (ref 3–18)
BASOPHILS # BLD: 0 K/UL (ref 0–0.1)
BASOPHILS NFR BLD: 0 % (ref 0–2)
BUN SERPL-MCNC: 19 MG/DL (ref 7–18)
BUN/CREAT SERPL: 14 (ref 12–20)
CALCIUM SERPL-MCNC: 8.1 MG/DL (ref 8.5–10.1)
CHLORIDE SERPL-SCNC: 112 MMOL/L (ref 100–108)
CO2 SERPL-SCNC: 28 MMOL/L (ref 21–32)
CREAT SERPL-MCNC: 1.36 MG/DL (ref 0.6–1.3)
DIFFERENTIAL METHOD BLD: ABNORMAL
ECHO AO ASC DIAM: 2.99 CM
ECHO AV AREA PEAK VELOCITY: -10.8 CM2
ECHO AV AREA VTI: 2.2 CM2
ECHO AV MEAN GRADIENT: 5.2 MMHG
ECHO AV PEAK GRADIENT: 0.4 MMHG
ECHO AV PEAK VELOCITY: -33.18 CM/S
ECHO AV VTI: 38.43 CM
ECHO LA VOL 2C: 69.49 ML (ref 22–52)
ECHO LA VOL 4C: 58.83 ML (ref 22–52)
ECHO LA VOLUME INDEX A2C: 33.6 ML/M2 (ref 16–28)
ECHO LA VOLUME INDEX A4C: 28.45 ML/M2 (ref 16–28)
ECHO LV INTERNAL DIMENSION DIASTOLIC: 4.56 CM (ref 3.9–5.3)
ECHO LV INTERNAL DIMENSION SYSTOLIC: 2.85 CM
ECHO LV ISOVOLUMETRIC RELAXATION TIME (IVRT): 99.8 MS
ECHO LV IVSD: 0.92 CM (ref 0.6–0.9)
ECHO LV MASS 2D: 139.6 G (ref 67–162)
ECHO LV MASS INDEX 2D: 67.5 G/M2 (ref 43–95)
ECHO LV POSTERIOR WALL DIASTOLIC: 0.75 CM (ref 0.6–0.9)
ECHO LVOT DIAM: 1.9 CM
ECHO LVOT PEAK GRADIENT: 6.5 MMHG
ECHO LVOT PEAK VELOCITY: 127.39 CM/S
ECHO LVOT VTI: 30.02 CM
ECHO MV A VELOCITY: 98.8 CM/S
ECHO MV AREA PHT: 3.4 CM2
ECHO MV AREA VTI: 2.2 CM2
ECHO MV E DECELERATION TIME (DT): 226.3 MS
ECHO MV E VELOCITY: 1.03 CM/S
ECHO MV E/A RATIO: 0.01
ECHO MV MAX VELOCITY: 127.42 CM/S
ECHO MV MEAN GRADIENT: 2.5 MMHG
ECHO MV PEAK GRADIENT: 6.5 MMHG
ECHO MV PRESSURE HALF TIME (PHT): 65.6 MS
ECHO MV VTI: 38.67 CM
ECHO PULMONARY ARTERY SYSTOLIC PRESSURE (PASP): 32 MMHG
ECHO RV INTERNAL DIMENSION: 3.45 CM
ECHO TV REGURGITANT MAX VELOCITY: -74.62 CM/S
ECHO TV REGURGITANT PEAK GRADIENT: 2.2 MMHG
EOSINOPHIL # BLD: 0.2 K/UL (ref 0–0.4)
EOSINOPHIL NFR BLD: 3 % (ref 0–5)
ERYTHROCYTE [DISTWIDTH] IN BLOOD BY AUTOMATED COUNT: 12.9 % (ref 11.6–14.5)
GLUCOSE BLD STRIP.AUTO-MCNC: 107 MG/DL (ref 70–110)
GLUCOSE BLD STRIP.AUTO-MCNC: 110 MG/DL (ref 70–110)
GLUCOSE BLD STRIP.AUTO-MCNC: 110 MG/DL (ref 70–110)
GLUCOSE BLD STRIP.AUTO-MCNC: 125 MG/DL (ref 70–110)
GLUCOSE SERPL-MCNC: 122 MG/DL (ref 74–99)
HCT VFR BLD AUTO: 36.3 % (ref 35–45)
HGB BLD-MCNC: 11.6 G/DL (ref 12–16)
LYMPHOCYTES # BLD: 1.3 K/UL (ref 0.9–3.6)
LYMPHOCYTES NFR BLD: 22 % (ref 21–52)
MCH RBC QN AUTO: 28.3 PG (ref 24–34)
MCHC RBC AUTO-ENTMCNC: 32 G/DL (ref 31–37)
MCV RBC AUTO: 88.5 FL (ref 74–97)
MONOCYTES # BLD: 0.5 K/UL (ref 0.05–1.2)
MONOCYTES NFR BLD: 8 % (ref 3–10)
NEUTS SEG # BLD: 4 K/UL (ref 1.8–8)
NEUTS SEG NFR BLD: 67 % (ref 40–73)
PLATELET # BLD AUTO: 101 K/UL (ref 135–420)
PMV BLD AUTO: 11.7 FL (ref 9.2–11.8)
POTASSIUM SERPL-SCNC: 4.1 MMOL/L (ref 3.5–5.5)
RBC # BLD AUTO: 4.1 M/UL (ref 4.2–5.3)
SODIUM SERPL-SCNC: 145 MMOL/L (ref 136–145)
WBC # BLD AUTO: 6 K/UL (ref 4.6–13.2)

## 2018-10-25 PROCEDURE — 74011250637 HC RX REV CODE- 250/637: Performed by: HOSPITALIST

## 2018-10-25 PROCEDURE — 74011250636 HC RX REV CODE- 250/636: Performed by: HOSPITALIST

## 2018-10-25 PROCEDURE — 82962 GLUCOSE BLOOD TEST: CPT

## 2018-10-25 PROCEDURE — 65660000001 HC RM ICU INTERMED STEPDOWN

## 2018-10-25 PROCEDURE — 80048 BASIC METABOLIC PNL TOTAL CA: CPT | Performed by: HOSPITALIST

## 2018-10-25 PROCEDURE — 36415 COLL VENOUS BLD VENIPUNCTURE: CPT | Performed by: HOSPITALIST

## 2018-10-25 PROCEDURE — 74011636637 HC RX REV CODE- 636/637: Performed by: HOSPITALIST

## 2018-10-25 PROCEDURE — 74011250637 HC RX REV CODE- 250/637: Performed by: PHYSICIAN ASSISTANT

## 2018-10-25 PROCEDURE — 85025 COMPLETE CBC W/AUTO DIFF WBC: CPT | Performed by: HOSPITALIST

## 2018-10-25 RX ORDER — AMLODIPINE BESYLATE 5 MG/1
5 TABLET ORAL DAILY
Status: DISCONTINUED | OUTPATIENT
Start: 2018-10-25 | End: 2018-10-26

## 2018-10-25 RX ADMIN — GABAPENTIN 300 MG: 300 CAPSULE ORAL at 17:36

## 2018-10-25 RX ADMIN — INSULIN GLARGINE 12 UNITS: 100 INJECTION, SOLUTION SUBCUTANEOUS at 09:38

## 2018-10-25 RX ADMIN — ESCITALOPRAM OXALATE 10 MG: 10 TABLET ORAL at 10:38

## 2018-10-25 RX ADMIN — HYDROCHLOROTHIAZIDE 25 MG: 25 TABLET ORAL at 09:46

## 2018-10-25 RX ADMIN — ASPIRIN 325 MG ORAL TABLET 325 MG: 325 PILL ORAL at 10:38

## 2018-10-25 RX ADMIN — HYDRALAZINE HYDROCHLORIDE 25 MG: 25 TABLET, FILM COATED ORAL at 09:46

## 2018-10-25 RX ADMIN — HYDRALAZINE HYDROCHLORIDE 25 MG: 25 TABLET, FILM COATED ORAL at 23:06

## 2018-10-25 RX ADMIN — GABAPENTIN 300 MG: 300 CAPSULE ORAL at 21:12

## 2018-10-25 RX ADMIN — CLOPIDOGREL BISULFATE 75 MG: 75 TABLET ORAL at 10:38

## 2018-10-25 RX ADMIN — ENOXAPARIN SODIUM 40 MG: 100 INJECTION SUBCUTANEOUS at 00:08

## 2018-10-25 RX ADMIN — GABAPENTIN 300 MG: 300 CAPSULE ORAL at 10:38

## 2018-10-25 RX ADMIN — AMLODIPINE BESYLATE 5 MG: 5 TABLET ORAL at 13:29

## 2018-10-25 RX ADMIN — ATORVASTATIN CALCIUM 80 MG: 40 TABLET, FILM COATED ORAL at 21:12

## 2018-10-25 NOTE — PROGRESS NOTES
Problem: Falls - Risk of 
Goal: *Absence of Falls Document Nunu Borges Fall Risk and appropriate interventions in the flowsheet. Outcome: Progressing Towards Goal 
Fall Risk Interventions: 
Mobility Interventions: Bed/chair exit alarm, Patient to call before getting OOB Medication Interventions: Bed/chair exit alarm Elimination Interventions: Bed/chair exit alarm, Call light in reach History of Falls Interventions: Bed/chair exit alarm, Investigate reason for fall, Evaluate medications/consider consulting pharmacy

## 2018-10-25 NOTE — CONSULTS
Cardiac Electrophysiology Consult Note    Consultation request by Dr. Woodrow Huggins for bradycardia/pacemaker  Date of  Admission: 10/23/2018  7:48 PM   Primary Care Physician:  Raleigh Ivan MD     Assessment:     Patient Active Problem List   Diagnosis Code    Diabetes E11.9    Hypertension I11.9    Dyslipidemia E78.5    Coronary artery disease I25.10    Uncontrolled diabetes mellitus ([facility] [address] E11.65    Chest pain R07.9    Stroke ([facility] [address] I63.9    Obesity, morbid ([facility] [address] E66.01    Type 2 diabetes with nephropathy ([facility] [address] E11.21    Type 2 diabetes mellitus with diabetic neuropathy ([facility] [address] E11.40    Advance care planning Z71.89    Mobitz type 2 second degree atrioventricular block I44.1       -Symptomatic irreversible bradycardia with type II 2nd AV block  -RBBB with LAFB and first degree AV block at baseline  -CAD with LCX stent 2013  -Echo 10/24/18 EF 60%, no WMA  -h/o stroke March 2018  -Diabetes  -HTN  -Dyslipidemia  -Atypical chest pain, negative enzymes  -h/o remote stroke     Plan:     Plan dual chamber pacemaker implantation. All risks, benefits, alternatives discussed. Plan moderate sedation if anesthesia not available. Risks included but not limited to pain, infection, bleeding, deep venous thrombosis with chronic swelling of arm, anesthesia reactions, pneumothorax, hemothorax, emergent open heart surgery, and death. All questions answered. If not MRI compatible device, I discussed inability to have future MRI scans but CT scans are acceptable. History of Present Illness: This is a 72 y.o. female admitted for Mobitz type 2 second degree atrioventricular block. Patient complains of:    Admitted 10/23/18 with chest pain. On telemetry noted to have type 2 second AV block with dizziness. No syncope. Enzymes normal.  Asked to see for pacemaker.     Review of Symptoms:  Except as stated above include:  Constitutional:  Negative  Ears, nose, throat:  Negative  Respiratory: negative  Cardiovascular:  Chest pain  Gastrointestinal: negative  Genitourinary:  negative  Musculoskeletal:  Negative  Neurological:  Negative  Dermatological:  Negative  Hematological: Negative  Endocrinological: Negative  Allergy: Negative  Psychological:  Negative       Past Medical History:     Past Medical History:   Diagnosis Date    Arthritis     Bilateral cataracts     sees Dr. Alex Everett COPD     Coronary artery disease     Cx - 2.75 x 33mm XIENCE 10/13     Diabetes     Dyslipidemia     Hypertension     MI (myocardial infarction) ([facility] [address]     Mobitz type 2 second degree atrioventricular block 10/23/2018    Noncompliance     Obesity     TIA (transient ischemic attack) 3/20/2018         Social History:     Social History     Socioeconomic History    Marital status: UNKNOWN     Spouse name: Not on file    Number of children: Not on file    Years of education: Not on file    Highest education level: Not on file   Social Needs    Financial resource strain: Not on file    Food insecurity - worry: Not on file    Food insecurity - inability: Not on file   [place] needs - medical: Not on file   [place] needs - non-medical: Not on file   Occupational History    Not on file   Tobacco Use    Smoking status: Former Smoker     Packs/day: 1.00     Years: 54.00     Pack years: 54.00     Start date: 2/10/2014    Smokeless tobacco: Never Used    Tobacco comment: pt states she uses e-cigs to stop smoking   Substance and Sexual Activity    Alcohol use: No    Drug use: No    Sexual activity: Not Currently   Other Topics Concern    Not on file   Social History Narrative    Not on file        Family History:     Family History   Problem Relation Age of Onset    Arthritis-osteo Mother     Heart Disease Mother     Alcohol abuse Neg Hx     Asthma Neg Hx     Bleeding Prob Neg Hx     Cancer Neg Hx     Diabetes Neg Hx     Elevated Lipids Neg Hx     Headache Neg Hx     Hypertension Neg Hx     Lung Disease Neg Hx     Migraines Neg Hx     Psychiatric Disorder Neg Hx     Stroke Neg Hx     Mental Retardation Neg Hx         Medications:   No Known Allergies     Current Facility-Administered Medications   Medication Dose Route Frequency    amLODIPine (NORVASC) tablet 5 mg  5 mg Oral DAILY    hydrALAZINE (APRESOLINE) tablet 25 mg  25 mg Oral Q6H PRN    acetaminophen (TYLENOL) tablet 1,000 mg  1,000 mg Oral Q6H PRN    ALPRAZolam (XANAX) tablet 0.5 mg  0.5 mg Oral TID PRN    aspirin tablet 325 mg  325 mg Oral DAILY    atorvastatin (LIPITOR) tablet 80 mg  80 mg Oral QHS    clopidogrel (PLAVIX) tablet 75 mg  75 mg Oral DAILY    escitalopram oxalate (LEXAPRO) tablet 10 mg  10 mg Oral DAILY    gabapentin (NEURONTIN) capsule 300 mg  300 mg Oral TID    hydroCHLOROthiazide (HYDRODIURIL) tablet 25 mg  25 mg Oral DAILY    insulin glargine (LANTUS) injection 12 Units  12 Units SubCUTAneous DAILY    nitroglycerin (NITROSTAT) tablet 0.4 mg  0.4 mg SubLINGual Q5MIN PRN    0.9% sodium chloride infusion  50 mL/hr IntraVENous CONTINUOUS    ondansetron (ZOFRAN) injection 4 mg  4 mg IntraVENous Q4H PRN    enoxaparin (LOVENOX) injection 40 mg  40 mg SubCUTAneous Q24H    insulin lispro (HUMALOG) injection   SubCUTAneous AC&HS    glucose chewable tablet 16 g  4 Tab Oral PRN    glucagon (GLUCAGEN) injection 1 mg  1 mg IntraMUSCular PRN    dextrose (D50W) injection syrg 12.5-25 g  25-50 mL IntraVENous PRN         Physical Exam:     Visit Vitals  /67   Pulse (!) 52   Temp 97.8 °F (36.6 °C)   Resp 18   Ht 5' 4\" (1.626 m)   Wt 104.4 kg (230 lb 2.6 oz)   SpO2 99%   Breastfeeding?  No   BMI 39.51 kg/m²     BP Readings from Last 3 Encounters:   10/25/18 176/67   06/04/18 122/60   03/29/18 122/60     Pulse Readings from Last 3 Encounters:   10/25/18 (!) 52   06/04/18 (!) 58   03/29/18 61     Wt Readings from Last 3 Encounters:   10/25/18 104.4 kg (230 lb 2.6 oz)   06/04/18 106.1 kg (234 lb)   03/29/18 104.8 kg (231 lb)       General:  alert, cooperative, no distress, appears stated age  Neck:  nontender  Lungs:  clear to auscultation bilaterally  Heart:  regular rate and rhythm, S1, S2 normal, no murmur, click, rub or gallop  Abdomen:  abdomen is soft without significant tenderness, masses, organomegaly or guarding  Extremities:  extremities normal, atraumatic, no cyanosis or edema  Skin: Warm and dry.  no hyperpigmentation, vitiligo, or suspicious lesions  Neuro: alert, oriented x3, affect appropriate, no focal neurological deficits, moves all extremities well, no involuntary movements, reflexes at knee and ankle intact  Psych: non focal     Data Review:     Recent Labs     10/23/18  2008   WBC 5.5   HGB 11.7*   HCT 35.9   *     Recent Labs     10/24/18  0500 10/23/18  2008   * 146*   K 3.8 4.2   * 113*   CO2 27 27   * 187*   BUN 22* 20*   CREA 1.05 1.22   CA 8.2* 8.0*   MG  --  1.9   ALB  --  2.6*   SGOT  --  19   ALT  --  20       Results for orders placed or performed during the hospital encounter of 10/23/18   EKG, 12 LEAD, INITIAL   Result Value Ref Range    Ventricular Rate 56 BPM    Atrial Rate 56 BPM    P-R Interval 378 ms    QRS Duration 122 ms    Q-T Interval 462 ms    QTC Calculation (Bezet) 445 ms    Calculated P Axis 94 degrees    Calculated R Axis -44 degrees    Calculated T Axis 27 degrees    Diagnosis       Sinus bradycardia  with likely Mobitz type I second degree AVB  Left axis deviation  Right bundle branch block  Voltage criteria for left ventricular hypertrophy  Abnormal ECG  When compared with ECG of 21-MAR-2018 17:26,  fusion complexes are now present  premature ventricular complexes are now present  premature atrial complexes are no longer present  RI interval has increased  Confirmed by Hubert Homans ([id number]) on 10/24/2018 4:59:34 PM         All Cardiac Markers in the last 24 hours:  No results found for: CPK, CK, CKMMB, CKMB, RCK3, CKMBT, CKNDX, CKND1, DAYAN, TROPT, TROIQ, DIYA, TROPT, TNIPOC, BNP, BNPP    Last Lipid:    Lab Results   Component Value Date/Time    Cholesterol, total 279 (H) 03/20/2018 08:32 AM    HDL Cholesterol 52 03/20/2018 08:32 AM    LDL, calculated 188.4 (H) 03/20/2018 08:32 AM    Triglyceride 193 (H) 03/20/2018 08:32 AM    CHOL/HDL Ratio 5.4 (H) 03/20/2018 08:32 AM       Signed By: Josselyn Parada MD     October 25, 2018

## 2018-10-25 NOTE — PROGRESS NOTES
Physical Exam  
Skin: Skin is warm and dry. Abrasion and bruising noted. Verbal change of shift report was given to me, Marlena Hashimoto RN  (oncoming night shift nurse), by Farrukh Patterson RN (offgoing day shift). Report included the following information:  SBAR, kardex, consultations, hemodynamic monitoring, intake/output, MAR, lab results, VS trends, cardiac rhythm noted Second Degree AVB Mobitz Type II/SB Skin, neuro, respiratory status, and order verification have been dually noted and verified at the bedside with:   Farrukh Patterson RN                                          
                      
ICU Nurse's Note: 
2000: Pt is awake, alert x 4. Pt independently moves all extremities, though she is unsteady on her feet and requires one assist with toileting ADLs. She is resting comfortable in bed, talking on the phone. She is in no acute distress. Bed is locked and in lowest position, call bell and bedside table are within reach. Exit alarm activated. Will continue to monitor. Neurological: as noted in assessment, residual LUE and LLE weakness from previous CVA March 2018 Cardiovascular: Second Degree AVB Mobitz Type II/SB on the monitor. Pulmonary: breath sounds clear, saturations maintained at 99%-100% on room air. Pt denies SOB or dyspnea GI/: Abdomen is obese, soft, non-distended, + bowel sounds. Last BM noted prior to admission 10/22/2018. Pt voids on BSC with assist, adequate urine output noted. Pt will be NPO after midnight for potential cardiac stress test and potentional placement of pacemaker device per Cardiology . IVF/Critical gtts: IV maintenance fluid Skin: as noted above 
2200: HS accucheck noted 137. BP elevated and sustained x 3 cycles of 10 min each with last reading noted 173/68. Pt was goven po prn Hydralazine along with her scheduled HS meds.  Pt was also additionally medicated at this time with prn tylenol and prn xanax to relieve generalized discomfort and anxiety about upcoming procedure in the morning. Bed is locked and in lowest position with exit alarm activated and side tails up x3. Call bell and bedside table are within reach. Will continue to monitor. 0000: Pt is NPO at this time for possible add on cardiac stress test/ pacemaker placement. Pt states understanding. Bed is locked and in lowest position, call bell and bedside table are within reach. Exit alarm activated. Will continue to monitor. 0400: Pt tolerated assist with toileting and bathing ADL's. She denies c/o pain, discomfort, or chest pressure. HR continues to trend SB/2nd degree AVB Mobitz II. 0600: Pt remains NPO at this time.  
0710: Bedside change of shift report given to: Gomez Martínez RN

## 2018-10-25 NOTE — PROGRESS NOTES
Physical Exam  
Skin:  
 
  
0735 Assessment completed. Pt is awake watching tv. Denies any dizziness; no chest pain. Mobitz 2- 2nd degree Heart block on the monitor. For possible intervention today. Kept NPO 
 
3808 Pt with elevated BP. Bp meds given with small sips of water 1030 Pt is scheduled to have pacer placement tomorrow. 1200 Reassessment done. Voiced no complaint 1600 Reassessment done. Pt denies any dizziness. 1830 Ate 100 % of dinner. 1930 Bedside and Verbal shift change report given to [address]  (oncoming nurse) by Tay Jones RN 
 (offgoing nurse). Report included the following information SBAR, Kardex, Intake/Output, MAR and Cardiac Rhythm Mobitz 2.

## 2018-10-25 NOTE — DIABETES MGMT
NUTRITIONAL ASSESSMENT GLYCEMIC CONTROL/ PLAN OF CARE Gerardo Bennett           72 y.o.           10/23/2018 1. Mobitz type 2 second degree atrioventricular block 2. Chest pain, unspecified type 3. Bradycardia DM INTERVENTIONS/PLAN:  
 
When diet resumed rec 2g Na 1500 calorie Diabetic diet. Diabetes Self-Management Education prior to discharge. ASSESSMENT:  
Nutritional Status: 
Pt is overweight related to excess caloric intake as evidenced by 184x% ideal weight and BMI=39.5kg/m2. Pt meets criteria for obesity. Altered nutrition-related lab values  RT DX. Diabetes Mellitus as evidenced by A1c of 7.5%. Pt previously had A1c's of 11 and 9%. Diabetes Management:  
Recent blood glucose:   
Lab Results Component Value Date/Time GLUCPOC 110 10/25/2018 08:21 AM  
 GLUCPOC 137 (H) 10/24/2018 10:00 PM  
 GLUCPOC 120 (H) 10/24/2018 05:43 PM  
Within target range (non-ICU: <140; ICU<180): [x] Yes   []  No 
Current Insulin regimen: 12 units lantus /d plus corrective lispro Home medication/insulin regimen: 30 units lantus/d plus 10 units novolog with meals HbA1c:7.5 equivalent  to ave Blood Glucose of 169mg/dl for 2-3 months prior to admission Adequate glycemic control PTA:  [x] Yes  [] No 
  
SUBJECTIVE/OBJECTIVE: Information obtained from: Pt reports visual problems related to her DM that she is being treated for. This effects her ability to read. Her glasses don't work. Diet: npo Patient Vitals for the past 100 hrs: 
 % Diet Eaten 10/24/18 1835 100 % 10/24/18 1300 50 % Medications: [x]                Reviewed Most Recent POC Glucose:  
Recent Labs 10/24/18 
0500 10/23/18 
2008 * 187* Labs:  
Lab Results Component Value Date/Time Hemoglobin A1c 7.5 (H) 10/23/2018 08:08 PM  
 
Lab Results Component Value Date/Time  Sodium 146 (H) 10/24/2018 05:00 AM  
 Potassium 3.8 10/24/2018 05:00 AM  
 Chloride 114 (H) 10/24/2018 05:00 AM  
 CO2 27 10/24/2018 05:00 AM  
 Anion gap 5 10/24/2018 05:00 AM  
 Glucose 131 (H) 10/24/2018 05:00 AM  
 BUN 22 (H) 10/24/2018 05:00 AM  
 Creatinine 1.05 10/24/2018 05:00 AM  
 Calcium 8.2 (L) 10/24/2018 05:00 AM  
 Magnesium 1.9 10/23/2018 08:08 PM  
 Albumin 2.6 (L) 10/23/2018 08:08 PM  
 
 
Anthropometrics: IBW : 56.7 kg (125 lb), % IBW (Calculated): 184.13 %, BMI (calculated): 39.5 Wt Readings from Last 1 Encounters:  
10/25/18 104.4 kg (230 lb 2.6 oz) Ht Readings from Last 1 Encounters:  
10/25/18 5' 4\" (1.626 m) Estimated Nutrition Needs:  1500 Kcals/day, Protein (g): 70 g Fluid (ml): 2000 ml Based on:   []          Actual BW    [x]         IBW   []            Adjusted BW   
Nutrition Diagnoses:  
   Obesity and DM Nutrition Interventions: When diet resumed rec 2g Na 1500 calorie Diabetic diet Goal:  
Blood glucose will be within target range of  mg/dL by 10/28. Intake will meet 75% of energy and protein requirements by 10/30. Gradual weight loss post discharge 1 lb per week by 11/2. Nutrition Monitoring and Evaluation []     Monitor po intake on meal rounds 
[x]     Continue inpatient monitoring and intervention 
[]     Other: 
 
 
Nutrition Risk:  []   High     []  Moderate    [x]  Minimal/Uncompromised Wendy Olvera RD 
[facility] [phone]

## 2018-10-25 NOTE — PROGRESS NOTES
Problem: Falls - Risk of 
Goal: *Absence of Falls Document Mayo Kappa Fall Risk and appropriate interventions in the flowsheet. Outcome: Progressing Towards Goal 
Fall Risk Interventions: 
Mobility Interventions: Bed/chair exit alarm, Assess mobility with egress test, Strengthening exercises (ROM-active/passive), Patient to call before getting OOB Medication Interventions: Bed/chair exit alarm, Evaluate medications/consider consulting pharmacy, Teach patient to arise slowly, Patient to call before getting OOB Elimination Interventions: Call light in reach, Patient to call for help with toileting needs, Toileting schedule/hourly rounds, Toilet paper/wipes in reach History of Falls Interventions: Bed/chair exit alarm, Door open when patient unattended, Evaluate medications/consider consulting pharmacy, Room close to nurse's station

## 2018-10-25 NOTE — DIABETES MGMT
Diabetes Patient/Family Education RecordFactors That  May Influence Patients Ability  to Learn or  Comply with Recommendations []   Language barrier    []   Cultural needs   []   Motivation  
 []   Cognitive limitation    []   Physical   []   Education  
 []   Physiological factors   [x]   vision   []   Yarsani beliefs [x]   Financial factors   []  Other:   []  No factors identified at this time. Person Instructed: [x]   Patient   []   Family   []  Other Preference for Learning: 
 [x]   Verbal   []   Written   []  Demonstration Level of Comprehension & Competence:   
[x]  Good                                      [] Fair                                     []  Poor                             [x]  Needs Reinforcement  
[x]  Teachback completed Education Component:  
[x]  Medication management, including how to administer insulin (if appropriate) and potential medication interactions []  Nutritional management   
[]  Exercise [x]  Signs, symptoms, and treatment of hyperglycemia and hypoglycemia [x] Prevention, recognition and treatment of hyperglycemia and hypoglycemia [x]  Importance of blood glucose monitoring and how to obtain a blood glucose meter   
[]  Instruction on use of the blood glucose meter [x]  Discuss the importance of HbA1C monitoring   
[x]  Sick day guidelines  
[]  Proper use and disposal of lancets, needles, syringes or insulin pens (if appropriate) [x]  Potential long-term complications (retinopathy, kidney disease, neuropathy, foot care)  
[] Information about whom to contact in case of emergency or for more information   
[x]  Goal:  Patient/family will demonstrate understanding of Diabetes Self Management Skills by: (date) 11/2/18_______ Plan for post-discharge education or self-management support: 
  [x] Outpatient class schedule provided            [] Patient Declined 
  [] Scheduled for outpatient classes (date) _______ Christiano Malone, RD 
pgr [phone]

## 2018-10-25 NOTE — PROGRESS NOTES
Problem: Falls - Risk of 
Goal: *Absence of Falls Document Arvind Shadow Fall Risk and appropriate interventions in the flowsheet. Outcome: Progressing Towards Goal 
Fall Risk Interventions: 
Mobility Interventions: Bed/chair exit alarm, Patient to call before getting OOB Medication Interventions: Bed/chair exit alarm Elimination Interventions: Bed/chair exit alarm, Call light in reach History of Falls Interventions: Bed/chair exit alarm, Investigate reason for fall, Evaluate medications/consider consulting pharmacy

## 2018-10-25 NOTE — PROGRESS NOTES
[address] [place] [address] Hospitalist Division Inpatient Daily Progress Note Patient: Mia Collazo MRN: [id number]  CSN: [id number] YOB: 1945  Age: 72 y.o. Sex: female DOA: 10/23/2018 LOS:  LOS: 2 days Chief Complaint:  Chest pain Interval History: Mia Collazo is a 72y.o. year old female who presents with chest pain. Hx of CAD with stenting, DM, HLD, HTN. Patient states the chest pain was pressure like and continues to be there even on interview now. It started around 2 pm with headache. No diaphoresis, N/V or SOB. She has had MI in the past with stent approximately 2013 and seems to be rather non compliant with medications. Cardiology consulted. Symptomatic bradycardia with int second degree type I/II w/ RBBB. Vincent negative. Echo completed w/ EF 60%, no wall motion (results as below). 10/25/18: PPM tomorrow, NPO after MN. Amlodopine added per cards for BP control. Subjective:  
  
NAD. Objective:  
  
Visit Vitals /62 Pulse (!) 51 Temp 97.8 °F (36.6 °C) Resp 18 Ht 5' 4\" (1.626 m) Wt 104.4 kg (230 lb 2.6 oz) SpO2 95% Breastfeeding? No  
BMI 39.51 kg/m² Physical Exam: 
General appearance: alert, cooperative Lungs: clear to auscultation bilaterally Heart: bradycardiac, second degree type II on monitor, S1, S2 normal 
Abdomen: soft, non tender, non distended. Normoactive bowel sounds. Extremities: extremities normal, atraumatic, b/l lower ext edema Skin: Skin color, texture, turgor normal. 
Neurologic: Grossly normal 
PSY: Mood and affect normal, appropriately behaved Intake and Output: 
Current Shift:  10/25 0701 - 10/25 1900 In: 48 [I.V.:50] Out: 500 [Urine:500] Last three shifts:  10/23 1901 - 10/25 0700 In: 2160 [P.O.:610; I.V.:1550] Out: 2750 [Urine:2750] Recent Results (from the past 24 hour(s)) GLUCOSE, POC  Collection Time: 10/24/18  5:43 PM  
 Result Value Ref Range Glucose (POC) 120 (H) 70 - 110 mg/dL ECHO ADULT FOLLOW-UP OR LIMITED Collection Time: 10/24/18  6:40 PM  
Result Value Ref Range AO ASC D 2.99 cm Aortic Valve Systolic Peak Velocity -96.20 cm/s AoV VTI 38.43 cm Aortic Valve Area by Continuity of Peak Velocity -10.8 cm2 Aortic Valve Area by Continuity of VTI 2.2 cm2 AoV PG 0.4 mmHg LVIDd 4.56 3.9 - 5.3 cm  
 LVPWd 0.75 0.6 - 0.9 cm LVIDs 2.85 cm IVSd 0.92 (A) 0.6 - 0.9 cm  
 LVOT d 1.90 cm  
 LVOT Peak Velocity 127.39 cm/s LVOT Peak Gradient 6.5 mmHg LVOT VTI 30.02 cm Left Ventricle Isovolumic Relaxation Time 99.8 ms  
 MVA (PHT) 3.4 cm2  
 MV A Josiah 98.80 cm/s  
 MV E Josiah 1.03 cm/s  
 MV E/A 0.01   
 MV Mean Gradient 2.5 mmHg Mitral Valve Annulus Velocity Time Integral 38.67 cm RVIDd 3.45 cm Aortic Valve Systolic Mean Gradient 5.2 mmHg LA Vol 4C 58.83 (A) 22 - 52 mL  
 LA Vol 2C 69.49 (A) 22 - 52 mL  
 LV Mass .6 67 - 162 g  
 LV Mass AL Index 67.5 43 - 95 g/m2 MV Peak Gradient 6.5 mmHg Mitral Valve E Wave Deceleration Time 226.3 ms  
 Mitral Valve Pressure Half-time 65.6 ms  
 Triscuspid Valve Regurgitation Peak Gradient 2.2 mmHg Mitral Valve Max Velocity 127.42 cm/s MVA VTI 2.2 cm2 TR Max Velocity -74.62 cm/s PASP 32.00 mmHg LA Vol Index 33.60 16 - 28 ml/m2 LA Vol Index 28.45 16 - 28 ml/m2 GLUCOSE, POC Collection Time: 10/24/18 10:00 PM  
Result Value Ref Range Glucose (POC) 137 (H) 70 - 110 mg/dL GLUCOSE, POC Collection Time: 10/25/18  8:21 AM  
Result Value Ref Range Glucose (POC) 110 70 - 110 mg/dL GLUCOSE, POC Collection Time: 10/25/18 12:34 PM  
Result Value Ref Range Glucose (POC) 107 70 - 110 mg/dL Lab Results Component Value Date/Time  Glucose 131 (H) 10/24/2018 05:00 AM  
 Glucose 187 (H) 10/23/2018 08:08 PM  
 Glucose 249 (H) 03/22/2018 03:47 AM  
 Glucose 304 (H) 03/19/2018 10:00 PM  
 Glucose 155 (H) 11/02/2017 11:30 PM  
 EXAM: XR CHEST PORT 
  
CLINICAL INDICATION/HISTORY: chest pain 
-Additional: None 
  
COMPARISON: 3/22/2018 
  
TECHNIQUE: Frontal view of the chest 
  
_______________ 
  
FINDINGS: 
  
HEART AND MEDIASTINUM: Unremarkable. 
  
LUNGS AND PLEURAL SPACES: Unremarkable. 
  
BONY THORAX AND SOFT TISSUES: Unremarkable. 
  
_______________ 
  
IMPRESSION IMPRESSION: 
  
No active cardiopulmonary disease. · Estimated left ventricular ejection fraction is 56 - 60%. Visually measured ejection fraction. Normal left ventricular wall motion, no regional wall motion abnormality noted. Age-appropriate left ventricular diastolic function. · Left atrial cavity size is mildly dilated. · Aortic valve leaflet calcification present. · Mild mitral annular calcification. Trace mitral valve stenosis. Mild mitral valve regurgitation. · Mild tricuspid valve regurgitation is present. There is no evidence of pulmonary hypertension. Pulmonary arterial systolic pressure is 32 mmHg. · Mildly elevated central venous pressure (5-10 mmHg); IVC diameter is less than 21 mm and collapses less than 50% with respiration. Myocardial Findings Left Ventricle Normal cavity size, wall thickness and systolic function (ejection fraction normal). The estimated ejection fraction is 56 - 60%. Visually measured ejection fraction. No regional wall motion abnormality noted. There is age-appropriate left ventricular diastolic function. Left Atrium The cavity size is mildly dilated. Right Ventricle Normal cavity size and global systolic function. Right Atrium Normal size. Aortic Valve Normal aortic valve structure. No stenosis and no regurgitation. There is leaflet calcification. Mitral Valve Normal valve structure. Mild mitral annular calcification. Trace stenosis present. Mild regurgitation. Tricuspid Valve Normal valve structure and no stenosis.  Mild tricuspid valve regurgitation. Pulmonary arterial systolic pressure is 32 mmHg. There is no evidence of pulmonary hypertension. Pulmonic Valve The pulmonic valve was not well visualized. No stenosis. Trace regurgitation. Aorta Normal aortic root. IVC/Hepatic Veins Mildly elevated central venous pressure (5-10 mmHg); IVC diameter is less than 21 mm and collapses less than 50% with respiration. Pericardium No evidence of pericardial effusion. TTE procedure Findings TTE Procedure Information Image quality: adequate. Assessment/Plan:  
 
Patient Active Problem List  
Diagnosis Code  Diabetes E11.9  Hypertension I11.9  Dyslipidemia E78.5  Coronary artery disease I25.10  Uncontrolled diabetes mellitus ([facility] [address] E11.65  Chest pain R07.9  Stroke ([facility] [address] [facility] I63.9  Obesity, morbid ([facility] [address] E66.01  
 Type 2 diabetes with nephropathy (HCC) E11.21  
 Type 2 diabetes mellitus with diabetic neuropathy (HCC) E11.40  Advance care planning Z71.89  
 Mobitz type 2 second degree atrioventricular block I44.1 A/P: 
 
Symptomatic bradycardia -2nd degree block, mobitz II 
-cardiology consulted-appreciate 
-PPM 10/26/18-NPO after MN 
-ECHO EF 60%; no wall abn, left atrial dilation noted, mild tricuspid regurg, mild mitral regurg 
-defer further recommendations cardiology Chest pain 
-ce's negative 
-telemetry 
-prn nitro 
-cardiology consulted 
-noncompliance noted with medications 
-resolved CAD 
-h/o LUZ MARIA placement to OM, Cx 10/2013  
-h/o non compliance with medications 
-ASA, plavix, bb held 2/2 bradycardia, statin DM II 
-accuchecks 
-SSI 
-lantus 12 U  
 
HTN 
-montior BP 
-amlodipine added to BP regimen 
-home medications HLD 
-home medications DVT px 
-lovenox GI-none Nick Kyle, VANDANA, NP-C [phone] Hospitalist Division [phone] Office:  [phone]

## 2018-10-26 ENCOUNTER — APPOINTMENT (OUTPATIENT)
Dept: GENERAL RADIOLOGY | Age: 73
DRG: 243 | End: 2018-10-26
Attending: INTERNAL MEDICINE
Payer: MEDICARE

## 2018-10-26 PROBLEM — Z95.0 PACEMAKER: Status: ACTIVE | Noted: 2018-10-26

## 2018-10-26 LAB
ANION GAP SERPL CALC-SCNC: 6 MMOL/L (ref 3–18)
BASOPHILS # BLD: 0 K/UL (ref 0–0.1)
BASOPHILS NFR BLD: 0 % (ref 0–2)
BUN SERPL-MCNC: 21 MG/DL (ref 7–18)
BUN/CREAT SERPL: 19 (ref 12–20)
CALCIUM SERPL-MCNC: 8.4 MG/DL (ref 8.5–10.1)
CHLORIDE SERPL-SCNC: 111 MMOL/L (ref 100–108)
CO2 SERPL-SCNC: 27 MMOL/L (ref 21–32)
CREAT SERPL-MCNC: 1.13 MG/DL (ref 0.6–1.3)
DIFFERENTIAL METHOD BLD: ABNORMAL
EOSINOPHIL # BLD: 0.1 K/UL (ref 0–0.4)
EOSINOPHIL NFR BLD: 1 % (ref 0–5)
ERYTHROCYTE [DISTWIDTH] IN BLOOD BY AUTOMATED COUNT: 12.8 % (ref 11.6–14.5)
GLUCOSE BLD STRIP.AUTO-MCNC: 106 MG/DL (ref 70–110)
GLUCOSE BLD STRIP.AUTO-MCNC: 125 MG/DL (ref 70–110)
GLUCOSE BLD STRIP.AUTO-MCNC: 132 MG/DL (ref 70–110)
GLUCOSE BLD STRIP.AUTO-MCNC: 199 MG/DL (ref 70–110)
GLUCOSE SERPL-MCNC: 159 MG/DL (ref 74–99)
HCT VFR BLD AUTO: 35.4 % (ref 35–45)
HGB BLD-MCNC: 11.7 G/DL (ref 12–16)
LYMPHOCYTES # BLD: 1.1 K/UL (ref 0.9–3.6)
LYMPHOCYTES NFR BLD: 17 % (ref 21–52)
MAGNESIUM SERPL-MCNC: 1.9 MG/DL (ref 1.6–2.6)
MCH RBC QN AUTO: 29 PG (ref 24–34)
MCHC RBC AUTO-ENTMCNC: 33.1 G/DL (ref 31–37)
MCV RBC AUTO: 87.6 FL (ref 74–97)
MONOCYTES # BLD: 0.9 K/UL (ref 0.05–1.2)
MONOCYTES NFR BLD: 13 % (ref 3–10)
NEUTS SEG # BLD: 4.6 K/UL (ref 1.8–8)
NEUTS SEG NFR BLD: 69 % (ref 40–73)
PLATELET # BLD AUTO: 113 K/UL (ref 135–420)
PMV BLD AUTO: 11.5 FL (ref 9.2–11.8)
POTASSIUM SERPL-SCNC: 3.9 MMOL/L (ref 3.5–5.5)
RBC # BLD AUTO: 4.04 M/UL (ref 4.2–5.3)
SODIUM SERPL-SCNC: 144 MMOL/L (ref 136–145)
WBC # BLD AUTO: 6.6 K/UL (ref 4.6–13.2)

## 2018-10-26 PROCEDURE — 36415 COLL VENOUS BLD VENIPUNCTURE: CPT | Performed by: HOSPITALIST

## 2018-10-26 PROCEDURE — 0JH606Z INSERTION OF PACEMAKER, DUAL CHAMBER INTO CHEST SUBCUTANEOUS TISSUE AND FASCIA, OPEN APPROACH: ICD-10-PCS | Performed by: INTERNAL MEDICINE

## 2018-10-26 PROCEDURE — C1898 LEAD, PMKR, OTHER THAN TRANS: HCPCS | Performed by: INTERNAL MEDICINE

## 2018-10-26 PROCEDURE — 99152 MOD SED SAME PHYS/QHP 5/>YRS: CPT | Performed by: INTERNAL MEDICINE

## 2018-10-26 PROCEDURE — 77030002996 HC SUT SLK J&J -A: Performed by: INTERNAL MEDICINE

## 2018-10-26 PROCEDURE — 74011250637 HC RX REV CODE- 250/637: Performed by: INTERNAL MEDICINE

## 2018-10-26 PROCEDURE — 74011250636 HC RX REV CODE- 250/636: Performed by: HOSPITALIST

## 2018-10-26 PROCEDURE — 85025 COMPLETE CBC W/AUTO DIFF WBC: CPT | Performed by: HOSPITALIST

## 2018-10-26 PROCEDURE — 02HK3JZ INSERTION OF PACEMAKER LEAD INTO RIGHT VENTRICLE, PERCUTANEOUS APPROACH: ICD-10-PCS | Performed by: INTERNAL MEDICINE

## 2018-10-26 PROCEDURE — 74011250637 HC RX REV CODE- 250/637: Performed by: HOSPITALIST

## 2018-10-26 PROCEDURE — 33208 INSRT HEART PM ATRIAL & VENT: CPT | Performed by: INTERNAL MEDICINE

## 2018-10-26 PROCEDURE — C1785 PMKR, DUAL, RATE-RESP: HCPCS | Performed by: INTERNAL MEDICINE

## 2018-10-26 PROCEDURE — 77030038269 HC DRN EXT URIN PURWCK BARD -A

## 2018-10-26 PROCEDURE — 77030031139 HC SUT VCRL2 J&J -A: Performed by: INTERNAL MEDICINE

## 2018-10-26 PROCEDURE — 99153 MOD SED SAME PHYS/QHP EA: CPT | Performed by: INTERNAL MEDICINE

## 2018-10-26 PROCEDURE — 74011636320 HC RX REV CODE- 636/320: Performed by: INTERNAL MEDICINE

## 2018-10-26 PROCEDURE — 82962 GLUCOSE BLOOD TEST: CPT

## 2018-10-26 PROCEDURE — 74011250636 HC RX REV CODE- 250/636

## 2018-10-26 PROCEDURE — 80048 BASIC METABOLIC PNL TOTAL CA: CPT | Performed by: HOSPITALIST

## 2018-10-26 PROCEDURE — 74011250637 HC RX REV CODE- 250/637: Performed by: PHYSICIAN ASSISTANT

## 2018-10-26 PROCEDURE — 74011636637 HC RX REV CODE- 636/637: Performed by: HOSPITALIST

## 2018-10-26 PROCEDURE — 65660000001 HC RM ICU INTERMED STEPDOWN

## 2018-10-26 PROCEDURE — 71045 X-RAY EXAM CHEST 1 VIEW: CPT

## 2018-10-26 PROCEDURE — 83735 ASSAY OF MAGNESIUM: CPT | Performed by: HOSPITALIST

## 2018-10-26 PROCEDURE — 74011250636 HC RX REV CODE- 250/636: Performed by: INTERNAL MEDICINE

## 2018-10-26 PROCEDURE — C1893 INTRO/SHEATH, FIXED,NON-PEEL: HCPCS | Performed by: INTERNAL MEDICINE

## 2018-10-26 PROCEDURE — B51N1ZZ FLUOROSCOPY OF LEFT UPPER EXTREMITY VEINS USING LOW OSMOLAR CONTRAST: ICD-10-PCS | Performed by: INTERNAL MEDICINE

## 2018-10-26 PROCEDURE — 77030002933 HC SUT MCRYL J&J -A: Performed by: INTERNAL MEDICINE

## 2018-10-26 PROCEDURE — A4565 SLINGS: HCPCS | Performed by: INTERNAL MEDICINE

## 2018-10-26 PROCEDURE — 02H63JZ INSERTION OF PACEMAKER LEAD INTO RIGHT ATRIUM, PERCUTANEOUS APPROACH: ICD-10-PCS | Performed by: INTERNAL MEDICINE

## 2018-10-26 PROCEDURE — 77030019580 HC CBL PACE MEDT -B: Performed by: INTERNAL MEDICINE

## 2018-10-26 DEVICE — LEAD PCMKR CAPSUR FIX NOVUS 52 --: Type: IMPLANTABLE DEVICE | Status: FUNCTIONAL

## 2018-10-26 DEVICE — PACEMAKER CARD STD GENRTR PERM 2 CHMBR COMPATIBLE W/O LD: Type: IMPLANTABLE DEVICE | Status: FUNCTIONAL

## 2018-10-26 DEVICE — LEAD PCMKR CAPSUR FIX 58CM -- CAPSUREFIX NOVUS MRI SURESCAN: Type: IMPLANTABLE DEVICE | Status: FUNCTIONAL

## 2018-10-26 RX ORDER — OXYCODONE AND ACETAMINOPHEN 5; 325 MG/1; MG/1
1 TABLET ORAL
Status: DISCONTINUED | OUTPATIENT
Start: 2018-10-26 | End: 2018-10-27 | Stop reason: HOSPADM

## 2018-10-26 RX ORDER — CARVEDILOL 6.25 MG/1
6.25 TABLET ORAL 2 TIMES DAILY WITH MEALS
Status: DISCONTINUED | OUTPATIENT
Start: 2018-10-26 | End: 2018-10-27 | Stop reason: HOSPADM

## 2018-10-26 RX ORDER — CEFAZOLIN SODIUM 2 G/50ML
2 SOLUTION INTRAVENOUS ONCE
Status: DISCONTINUED | OUTPATIENT
Start: 2018-10-26 | End: 2018-10-26 | Stop reason: SDUPTHER

## 2018-10-26 RX ORDER — CEFAZOLIN SODIUM 2 G/50ML
2 SOLUTION INTRAVENOUS EVERY 8 HOURS
Status: COMPLETED | OUTPATIENT
Start: 2018-10-27 | End: 2018-10-27

## 2018-10-26 RX ORDER — AMLODIPINE BESYLATE 5 MG/1
5 TABLET ORAL ONCE
Status: ACTIVE | OUTPATIENT
Start: 2018-10-26 | End: 2018-10-27

## 2018-10-26 RX ORDER — MIDAZOLAM HYDROCHLORIDE 1 MG/ML
INJECTION, SOLUTION INTRAMUSCULAR; INTRAVENOUS AS NEEDED
Status: DISCONTINUED | OUTPATIENT
Start: 2018-10-26 | End: 2018-10-26 | Stop reason: HOSPADM

## 2018-10-26 RX ORDER — AMLODIPINE BESYLATE 10 MG/1
10 TABLET ORAL DAILY
Status: DISCONTINUED | OUTPATIENT
Start: 2018-10-27 | End: 2018-10-27 | Stop reason: HOSPADM

## 2018-10-26 RX ORDER — CEFAZOLIN SODIUM 2 G/50ML
2 SOLUTION INTRAVENOUS ONCE
Status: COMPLETED | OUTPATIENT
Start: 2018-10-26 | End: 2018-10-26

## 2018-10-26 RX ORDER — FENTANYL CITRATE 50 UG/ML
INJECTION, SOLUTION INTRAMUSCULAR; INTRAVENOUS AS NEEDED
Status: DISCONTINUED | OUTPATIENT
Start: 2018-10-26 | End: 2018-10-26 | Stop reason: HOSPADM

## 2018-10-26 RX ORDER — LIDOCAINE HYDROCHLORIDE 10 MG/ML
INJECTION, SOLUTION EPIDURAL; INFILTRATION; INTRACAUDAL; PERINEURAL AS NEEDED
Status: DISCONTINUED | OUTPATIENT
Start: 2018-10-26 | End: 2018-10-26 | Stop reason: HOSPADM

## 2018-10-26 RX ADMIN — GABAPENTIN 300 MG: 300 CAPSULE ORAL at 08:29

## 2018-10-26 RX ADMIN — ESCITALOPRAM OXALATE 10 MG: 10 TABLET ORAL at 08:29

## 2018-10-26 RX ADMIN — SODIUM CHLORIDE 50 ML/HR: 900 INJECTION, SOLUTION INTRAVENOUS at 18:54

## 2018-10-26 RX ADMIN — GABAPENTIN 300 MG: 300 CAPSULE ORAL at 17:46

## 2018-10-26 RX ADMIN — INSULIN LISPRO 2 UNITS: 100 INJECTION, SOLUTION INTRAVENOUS; SUBCUTANEOUS at 21:39

## 2018-10-26 RX ADMIN — HYDROCHLOROTHIAZIDE 25 MG: 25 TABLET ORAL at 08:29

## 2018-10-26 RX ADMIN — HYDRALAZINE HYDROCHLORIDE 25 MG: 25 TABLET, FILM COATED ORAL at 10:36

## 2018-10-26 RX ADMIN — ALPRAZOLAM 0.5 MG: 0.5 TABLET ORAL at 03:01

## 2018-10-26 RX ADMIN — AMLODIPINE BESYLATE 5 MG: 5 TABLET ORAL at 08:29

## 2018-10-26 RX ADMIN — INSULIN GLARGINE 12 UNITS: 100 INJECTION, SOLUTION SUBCUTANEOUS at 08:29

## 2018-10-26 RX ADMIN — CEFAZOLIN SODIUM 2 G: 2 SOLUTION INTRAVENOUS at 15:17

## 2018-10-26 RX ADMIN — GABAPENTIN 300 MG: 300 CAPSULE ORAL at 21:32

## 2018-10-26 RX ADMIN — ATORVASTATIN CALCIUM 80 MG: 40 TABLET, FILM COATED ORAL at 21:32

## 2018-10-26 RX ADMIN — CARVEDILOL 6.25 MG: 6.25 TABLET, FILM COATED ORAL at 17:46

## 2018-10-26 RX ADMIN — OXYCODONE AND ACETAMINOPHEN 1 TABLET: 5; 325 TABLET ORAL at 21:41

## 2018-10-26 NOTE — PROGRESS NOTES
Physical Exam  
Skin: Skin is warm and dry. Abrasion and bruising noted.   
 
  
Two nurse check with Eileen MUJICA

## 2018-10-26 NOTE — PROGRESS NOTES
[facility]  -  Progress Note Patient: Maldonado Sena MRN: [id number]  SSN: [id number] YOB: 1945  Age: 72 y.o. Sex: female Admit Date: 10/23/2018 I saw, evaluated, interviewed and examined the patient personally. I agree with the findings and plan of care as documented below with PAODETTE note Plan for PPM today  to place Blake Harris MD 
 
 
 
Assessment: - Symptomatic bradycardia: Intermittent Second degree type I and II noted on EKG and telemetry with RBBB - Chest pain, negative cardiac enzymes x3 
- CAD with LUZ MARIA to Cx and OM in 10/2013 
- Echo 3/2018 with EF 75%, grade 2 DD 
- Echo 10/24/18 with EF 60%, no wall motion abnormality, LA cavity size mildly dilated, mild TR 
- Hx of CVA in March 2018: MRI at the time showed an acute posterior lateral right thalamic lacunar infarct.  
- Diabetes Mellitus - Hypertension - Dyslipidemia - Obesity Plan:  
 
-Pt is NPO pending procedure this afternoon, tentatively scheduled for 13:30. 
-BP has been elevated requiring PRN Hydralazine. Would increase Amlodipine to 10 mg daily.   
-Avoiding AV lalito blocking agents for now pending PPM placement. Subjective: No new complaints. Objective:  
  
Patient Vitals for the past 8 hrs: 
 Temp Pulse Resp BP SpO2  
10/26/18 1108 97.7 °F (36.5 °C)      
10/26/18 1100  (!) 54 15 158/62 95 % 10/26/18 1000  (!) 54 15 183/70 96 % 10/26/18 0907  60 22 179/65 96 % 10/26/18 0900  (!) 50 17  97 % 10/26/18 0800 97.6 °F (36.4 °C) (!) 50 17 179/63 95 % 10/26/18 0751 97.6 °F (36.4 °C)     Patient Vitals for the past 96 hrs: 
 Weight 10/25/18 0700 230 lb 2.6 oz (104.4 kg) 10/24/18 1840 228 lb (103.4 kg) 10/24/18 0030 228 lb 13.4 oz (103.8 kg) 10/23/18 1958 240 lb (108.9 kg) Intake/Output Summary (Last 24 hours) at 10/26/2018 1146 Last data filed at 10/26/2018 0800 Gross per 24 hour Intake 2180 ml  
 Output 3250 ml Net -1070 ml Physical Exam: 
General:  alert, cooperative, no distress, appears stated age Neck:  No JVD Lungs:  clear to auscultation bilaterally Heart:  Bradycardic regular rhythm with occasional missed beats Abdomen:  abdomen is soft without significant tenderness, masses, organomegaly or guarding Extremities:  Atraumatic, no edema Data Review:  
 
Labs: Results:  
   
Chemistry Recent Labs 10/26/18 
0340 10/25/18 
1731 10/24/18 
0500 10/23/18 
2008 * 122* 131* 187*  145 146* 146*  
K 3.9 4.1 3.8 4.2 * 112* 114* 113* CO2 27 28 27 27 BUN 21* 19* 22* 20* CREA 1.13 1.36* 1.05 1.22  
CA 8.4* 8.1* 8.2* 8.0*  
MG 1.9  --   --  1.9 AGAP 6 5 5 6 BUCR 19 14 21* 16  
AP  --   --   --  116 TP  --   --   --  5.9* ALB  --   --   --  2.6*  
GLOB  --   --   --  3.3 AGRAT  --   --   --  0.8 CBC w/Diff Recent Labs 10/26/18 
0340 10/25/18 
1731 10/23/18 
2008 WBC 6.6 6.0 5.5  
RBC 4.04* 4.10* 4.13* HGB 11.7* 11.6* 11.7* HCT 35.4 36.3 35.9 * 101* 110* GRANS 69 67 67 LYMPH 17* 22 19* EOS 1 3 1 Lipid Panel Lab Results Component Value Date/Time Cholesterol, total 279 (H) 03/20/2018 08:32 AM  
 HDL Cholesterol 52 03/20/2018 08:32 AM  
 LDL, calculated 188.4 (H) 03/20/2018 08:32 AM  
 VLDL, calculated 38.6 03/20/2018 08:32 AM  
 Triglyceride 193 (H) 03/20/2018 08:32 AM  
 CHOL/HDL Ratio 5.4 (H) 03/20/2018 08:32 AM  
  
Liver Enzymes Recent Labs 10/23/18 
2008 TP 5.9* ALB 2.6*  SGOT 19 Thyroid Studies Lab Results Component Value Date/Time  TSH 1.67 03/20/2018 08:32 AM

## 2018-10-26 NOTE — PROGRESS NOTES
0800-Received pt resting in bed with eyes closed, easily awakened, no sign of distress, vss on room air, will continue to monitor 1000-Pt seen by Cardiology PA, Blood pressure discussed with RN, will give PRN hydralazine if BP remains elevated, will hold aspirin and plavix until further notice 1350-Pt transferred off floor via bed for procedure 1700-Pt returned from Cath Lab, pacer spikes noted on monitor, dressing clean dry and intact, sling in place, pt denies pain, no sign of distress 1845-Ate dinner, tolerated well, large soft brown bowel movement in BSC, tolerated well 
1900-Bedside and Verbal shift change report given to [employer] (oncoming nurse) by Sheldon Grande RN (offgoing nurse). Report included the following information SBAR, Kardex, Intake/Output, MAR, Recent Results and Cardiac Rhythm AV paced.

## 2018-10-26 NOTE — PROGRESS NOTES
Problem: Falls - Risk of 
Goal: *Absence of Falls Document Mary Pearce Fall Risk and appropriate interventions in the flowsheet. Outcome: Progressing Towards Goal 
Fall Risk Interventions: 
Mobility Interventions: Patient to call before getting OOB Medication Interventions: Evaluate medications/consider consulting pharmacy Elimination Interventions: Toileting schedule/hourly rounds, Patient to call for help with toileting needs History of Falls Interventions: Evaluate medications/consider consulting pharmacy

## 2018-10-26 NOTE — ROUTINE PROCESS
1900-Bedside shift change report given to William Dyson RN (oncoming nurse) by Stefani Bell (offgoing nurse). Report included the following information SBAR, Kardex and MAR. Patient alert and oriented x4, no complaints of pain at this time. Reminded to call before getting oob. 2100- Patient awake and alert, No distress noted. 2300-Patient up to bed side commode, tolerated well. 
 
0100-Patient stated that she was unable to sleep.  
 
0300-Patient up to bedside commode. 0700-Bedside shift change report given to Ignacio Diop (oncoming nurse) by William Dyson RN (offgoing nurse). Report included the following information SBAR, Kardex and MAR. Patient resting at this time, Night uneventful. Patient given Chg bath this as No distress not

## 2018-10-26 NOTE — PROGRESS NOTES
[address] [place] [address] Hospitalist Division Inpatient Daily Progress Note Patient: Tim Austin MRN: [id number]  CSN: [id number] YOB: 1945  Age: 72 y.o. Sex: female DOA: 10/23/2018 LOS:  LOS: 3 days Chief Complaint:  Chest pain Interval History: Tim Austin is a 72y.o. year old female who presents with chest pain. Hx of CAD with stenting, DM, HLD, HTN. Patient states the chest pain was pressure like and continues to be there even on interview now. It started around 2 pm with headache. No diaphoresis, N/V or SOB. She has had MI in the past with stent approximately 2013 and seems to be rather non compliant with medications. Cardiology consulted. Symptomatic bradycardia with int second degree type I/II w/ RBBB. Vincent negative. Echo completed w/ EF 60%, no wall motion (results as below). 10/25/18: PPM tomorrow, NPO after MN. Amlodopine added per cards for BP control. 10/26/18: For PPM placement today. Diet after procedure. Xray and pacer interrogation in the am per cardiology. Can anticipate d/c tomorrow. Amlodipine increased to 10 mg. Subjective:  
  
NAD. Objective:  
  
Visit Vitals /72 Pulse 60 Temp 98.9 °F (37.2 °C) Resp 18 Ht 5' 4\" (1.626 m) Wt 104.4 kg (230 lb 2.6 oz) SpO2 96% Breastfeeding? No  
BMI 39.51 kg/m² Physical Exam: 
General appearance: alert, cooperative Lungs: clear to auscultation bilaterally Heart: bradycardiac, regular rhythm on monitor currently, S1, S2 normal 
Abdomen: soft, non tender, non distended. Normoactive bowel sounds. Extremities: extremities normal, atraumatic, b/l lower ext edema Skin: Skin color, texture, turgor normal. 
Neurologic: Grossly normal 
PSY: Mood and affect normal, appropriately behaved Intake and Output: 
Current Shift:  No intake/output data recorded. Last three shifts:  10/24 1901 - 10/26 0700 In: 3350 [P.O.:2150; I.V.:1200] Out: 4150 [[id number]] Recent Results (from the past 24 hour(s)) GLUCOSE, POC Collection Time: 10/25/18  8:21 AM  
Result Value Ref Range Glucose (POC) 110 70 - 110 mg/dL GLUCOSE, POC Collection Time: 10/25/18 12:34 PM  
Result Value Ref Range Glucose (POC) 107 70 - 110 mg/dL CBC WITH AUTOMATED DIFF Collection Time: 10/25/18  5:31 PM  
Result Value Ref Range WBC 6.0 4.6 - 13.2 K/uL  
 RBC 4.10 (L) 4.20 - 5.30 M/uL  
 HGB 11.6 (L) 12.0 - 16.0 g/dL HCT 36.3 35.0 - 45.0 % MCV 88.5 74.0 - 97.0 FL  
 MCH 28.3 24.0 - 34.0 PG  
 MCHC 32.0 31.0 - 37.0 g/dL  
 RDW 12.9 11.6 - 14.5 % PLATELET 799 (L) 993 - 420 K/uL MPV 11.7 9.2 - 11.8 FL  
 NEUTROPHILS 67 40 - 73 % LYMPHOCYTES 22 21 - 52 % MONOCYTES 8 3 - 10 % EOSINOPHILS 3 0 - 5 % BASOPHILS 0 0 - 2 %  
 ABS. NEUTROPHILS 4.0 1.8 - 8.0 K/UL  
 ABS. LYMPHOCYTES 1.3 0.9 - 3.6 K/UL  
 ABS. MONOCYTES 0.5 0.05 - 1.2 K/UL  
 ABS. EOSINOPHILS 0.2 0.0 - 0.4 K/UL  
 ABS. BASOPHILS 0.0 0.0 - 0.1 K/UL  
 DF AUTOMATED METABOLIC PANEL, BASIC Collection Time: 10/25/18  5:31 PM  
Result Value Ref Range Sodium 145 136 - 145 mmol/L Potassium 4.1 3.5 - 5.5 mmol/L Chloride 112 (H) 100 - 108 mmol/L  
 CO2 28 21 - 32 mmol/L Anion gap 5 3.0 - 18 mmol/L Glucose 122 (H) 74 - 99 mg/dL BUN 19 (H) 7.0 - 18 MG/DL Creatinine 1.36 (H) 0.6 - 1.3 MG/DL  
 BUN/Creatinine ratio 14 12 - 20 GFR est AA 46 (L) >60 ml/min/1.73m2 GFR est non-AA 38 (L) >60 ml/min/1.73m2 Calcium 8.1 (L) 8.5 - 10.1 MG/DL  
GLUCOSE, POC Collection Time: 10/25/18  5:33 PM  
Result Value Ref Range Glucose (POC) 110 70 - 110 mg/dL GLUCOSE, POC Collection Time: 10/25/18  9:17 PM  
Result Value Ref Range Glucose (POC) 125 (H) 70 - 110 mg/dL METABOLIC PANEL, BASIC Collection Time: 10/26/18  3:40 AM  
Result Value Ref Range Sodium 144 136 - 145 mmol/L Potassium 3.9 3.5 - 5.5 mmol/L  Chloride 111 (H) 100 - 108 mmol/L  
 CO2 27 21 - 32 mmol/L Anion gap 6 3.0 - 18 mmol/L Glucose 159 (H) 74 - 99 mg/dL BUN 21 (H) 7.0 - 18 MG/DL Creatinine 1.13 0.6 - 1.3 MG/DL  
 BUN/Creatinine ratio 19 12 - 20 GFR est AA 58 (L) >60 ml/min/1.73m2 GFR est non-AA 47 (L) >60 ml/min/1.73m2 Calcium 8.4 (L) 8.5 - 10.1 MG/DL MAGNESIUM Collection Time: 10/26/18  3:40 AM  
Result Value Ref Range Magnesium 1.9 1.6 - 2.6 mg/dL CBC WITH AUTOMATED DIFF Collection Time: 10/26/18  3:40 AM  
Result Value Ref Range WBC 6.6 4.6 - 13.2 K/uL  
 RBC 4.04 (L) 4.20 - 5.30 M/uL  
 HGB 11.7 (L) 12.0 - 16.0 g/dL HCT 35.4 35.0 - 45.0 % MCV 87.6 74.0 - 97.0 FL  
 MCH 29.0 24.0 - 34.0 PG  
 MCHC 33.1 31.0 - 37.0 g/dL  
 RDW 12.8 11.6 - 14.5 % PLATELET 015 (L) 200 - 420 K/uL MPV 11.5 9.2 - 11.8 FL  
 NEUTROPHILS 69 40 - 73 % LYMPHOCYTES 17 (L) 21 - 52 % MONOCYTES 13 (H) 3 - 10 % EOSINOPHILS 1 0 - 5 % BASOPHILS 0 0 - 2 %  
 ABS. NEUTROPHILS 4.6 1.8 - 8.0 K/UL  
 ABS. LYMPHOCYTES 1.1 0.9 - 3.6 K/UL  
 ABS. MONOCYTES 0.9 0.05 - 1.2 K/UL  
 ABS. EOSINOPHILS 0.1 0.0 - 0.4 K/UL  
 ABS. BASOPHILS 0.0 0.0 - 0.1 K/UL  
 DF AUTOMATED Lab Results Component Value Date/Time Glucose 159 (H) 10/26/2018 03:40 AM  
 Glucose 122 (H) 10/25/2018 05:31 PM  
 Glucose 131 (H) 10/24/2018 05:00 AM  
 Glucose 187 (H) 10/23/2018 08:08 PM  
 Glucose 249 (H) 03/22/2018 03:47 AM  
 EXAM: XR CHEST PORT 
  
CLINICAL INDICATION/HISTORY: chest pain 
-Additional: None 
  
COMPARISON: 3/22/2018 
  
TECHNIQUE: Frontal view of the chest 
  
_______________ 
  
FINDINGS: 
  
HEART AND MEDIASTINUM: Unremarkable. 
  
LUNGS AND PLEURAL SPACES: Unremarkable. 
  
BONY THORAX AND SOFT TISSUES: Unremarkable. 
  
_______________ 
  
IMPRESSION IMPRESSION: 
  
No active cardiopulmonary disease. · Estimated left ventricular ejection fraction is 56 - 60%. Visually measured ejection fraction.  Normal left ventricular wall motion, no regional wall motion abnormality noted. Age-appropriate left ventricular diastolic function. · Left atrial cavity size is mildly dilated. · Aortic valve leaflet calcification present. · Mild mitral annular calcification. Trace mitral valve stenosis. Mild mitral valve regurgitation. · Mild tricuspid valve regurgitation is present. There is no evidence of pulmonary hypertension. Pulmonary arterial systolic pressure is 32 mmHg. · Mildly elevated central venous pressure (5-10 mmHg); IVC diameter is less than 21 mm and collapses less than 50% with respiration. Myocardial Findings Left Ventricle Normal cavity size, wall thickness and systolic function (ejection fraction normal). The estimated ejection fraction is 56 - 60%. Visually measured ejection fraction. No regional wall motion abnormality noted. There is age-appropriate left ventricular diastolic function. Left Atrium The cavity size is mildly dilated. Right Ventricle Normal cavity size and global systolic function. Right Atrium Normal size. Aortic Valve Normal aortic valve structure. No stenosis and no regurgitation. There is leaflet calcification. Mitral Valve Normal valve structure. Mild mitral annular calcification. Trace stenosis present. Mild regurgitation. Tricuspid Valve Normal valve structure and no stenosis. Mild tricuspid valve regurgitation. Pulmonary arterial systolic pressure is 32 mmHg. There is no evidence of pulmonary hypertension. Pulmonic Valve The pulmonic valve was not well visualized. No stenosis. Trace regurgitation. Aorta Normal aortic root. IVC/Hepatic Veins Mildly elevated central venous pressure (5-10 mmHg); IVC diameter is less than 21 mm and collapses less than 50% with respiration. Pericardium No evidence of pericardial effusion. TTE procedure Findings TTE Procedure Information Image quality: adequate. Assessment/Plan:  
 
Patient Active Problem List  
Diagnosis Code  Diabetes E11.9  Hypertension I11.9  Dyslipidemia E78.5  Coronary artery disease I25.10  Uncontrolled diabetes mellitus ([facility] [address] E11.65  Chest pain R07.9  Stroke ([facility] [address] I63.9  Obesity, morbid ([facility] [address] E66.01  
 Type 2 diabetes with nephropathy (HCC) E11.21  
 Type 2 diabetes mellitus with diabetic neuropathy (HCC) E11.40  Advance care planning Z71.89  
 Mobitz type 2 second degree atrioventricular block I44.1 A/P: 
 
Symptomatic bradycardia -2nd degree block, mobitz II 
-cardiology consulted-appreciate 
-PPM 10/26/18-xray and interrogation tomorrow per cardiology -ECHO EF 60%; no wall abn, left atrial dilation noted, mild tricuspid regurg, mild mitral regurg 
-defer further recommendations cardiology Chest pain 
-ce's negative 
-telemetry 
-prn nitro 
-cardiology consulted 
-noncompliance noted with medications 
-resolved CAD 
-h/o LUZ MARIA placement to OM, Cx 10/2013  
-h/o non compliance with medications 
-ASA, plavix, bb held 2/2 bradycardia, statin DM II 
-accuchecks 
-SSI 
-lantus 12 U  
 
HTN 
-montior BP 
-amlodipine added to BP regimen 
-home medications HLD 
-home medications DVT px 
-lovenox GI-none Samia Davidson, VANDANA, NP-C [address] [facility] [phone] Office:  [phone]

## 2018-10-27 ENCOUNTER — APPOINTMENT (OUTPATIENT)
Dept: GENERAL RADIOLOGY | Age: 73
DRG: 243 | End: 2018-10-27
Attending: INTERNAL MEDICINE
Payer: MEDICARE

## 2018-10-27 VITALS
OXYGEN SATURATION: 95 % | HEIGHT: 64 IN | TEMPERATURE: 97.7 F | RESPIRATION RATE: 16 BRPM | DIASTOLIC BLOOD PRESSURE: 97 MMHG | HEART RATE: 60 BPM | BODY MASS INDEX: 39.29 KG/M2 | WEIGHT: 230.16 LBS | SYSTOLIC BLOOD PRESSURE: 138 MMHG

## 2018-10-27 LAB
GLUCOSE BLD STRIP.AUTO-MCNC: 131 MG/DL (ref 70–110)
GLUCOSE BLD STRIP.AUTO-MCNC: 164 MG/DL (ref 70–110)

## 2018-10-27 PROCEDURE — 82962 GLUCOSE BLOOD TEST: CPT

## 2018-10-27 PROCEDURE — 71046 X-RAY EXAM CHEST 2 VIEWS: CPT

## 2018-10-27 PROCEDURE — 74011250636 HC RX REV CODE- 250/636: Performed by: HOSPITALIST

## 2018-10-27 PROCEDURE — 74011250637 HC RX REV CODE- 250/637: Performed by: HOSPITALIST

## 2018-10-27 PROCEDURE — 74011250637 HC RX REV CODE- 250/637: Performed by: INTERNAL MEDICINE

## 2018-10-27 PROCEDURE — 74011636637 HC RX REV CODE- 636/637: Performed by: HOSPITALIST

## 2018-10-27 PROCEDURE — 74011250637 HC RX REV CODE- 250/637: Performed by: PHYSICIAN ASSISTANT

## 2018-10-27 PROCEDURE — 74011250636 HC RX REV CODE- 250/636: Performed by: INTERNAL MEDICINE

## 2018-10-27 RX ORDER — CARVEDILOL 6.25 MG/1
6.25 TABLET ORAL 2 TIMES DAILY WITH MEALS
Qty: 60 TAB | Refills: 1 | Status: SHIPPED | OUTPATIENT
Start: 2018-10-27 | End: 2019-01-21 | Stop reason: SDUPTHER

## 2018-10-27 RX ADMIN — CEFAZOLIN SODIUM 2 G: 2 SOLUTION INTRAVENOUS at 08:37

## 2018-10-27 RX ADMIN — ENOXAPARIN SODIUM 40 MG: 100 INJECTION SUBCUTANEOUS at 00:48

## 2018-10-27 RX ADMIN — GABAPENTIN 300 MG: 300 CAPSULE ORAL at 08:38

## 2018-10-27 RX ADMIN — CLOPIDOGREL BISULFATE 75 MG: 75 TABLET ORAL at 08:37

## 2018-10-27 RX ADMIN — HYDROCHLOROTHIAZIDE 25 MG: 25 TABLET ORAL at 08:38

## 2018-10-27 RX ADMIN — ACETAMINOPHEN 1000 MG: 500 TABLET, FILM COATED ORAL at 07:47

## 2018-10-27 RX ADMIN — ESCITALOPRAM OXALATE 10 MG: 10 TABLET ORAL at 08:38

## 2018-10-27 RX ADMIN — CEFAZOLIN SODIUM 2 G: 2 SOLUTION INTRAVENOUS at 00:45

## 2018-10-27 RX ADMIN — INSULIN GLARGINE 12 UNITS: 100 INJECTION, SOLUTION SUBCUTANEOUS at 08:38

## 2018-10-27 RX ADMIN — CARVEDILOL 6.25 MG: 6.25 TABLET, FILM COATED ORAL at 08:38

## 2018-10-27 RX ADMIN — AMLODIPINE BESYLATE 10 MG: 10 TABLET ORAL at 08:38

## 2018-10-27 RX ADMIN — ASPIRIN 325 MG ORAL TABLET 325 MG: 325 PILL ORAL at 08:38

## 2018-10-27 NOTE — PROGRESS NOTES
[facility]  -  Progress Note Patient: Davie Valerio MRN: [id number]  SSN: [id number] YOB: 1945  Age: 72 y.o. Sex: female Admit Date: 10/23/2018 Assessment: - Symptomatic bradycardia: Intermittent Second degree type I and II noted on EKG and telemetry with RBBB 
- S/P dual chamber Medtronic pacemaker - Chest pain, negative cardiac enzymes x3 
- CAD with LUZ MARIA to Cx and OM in 10/2013 
- Echo 3/2018 with EF 75%, grade 2 DD 
- Echo 10/24/18 with EF 60%, no wall motion abnormality, LA cavity size mildly dilated, mild TR 
- Hx of CVA in March 2018: MRI at the time showed an acute posterior lateral right thalamic lacunar infarct.  
- Diabetes Mellitus - Hypertension - Dyslipidemia - Obesity Plan:  
 
Pacer site without bleeding hematoma or oozing. Looking good Coreg started for HTN yesterday Paced today Pacer site care discussed with patient. Advised patient to wear arm sling and advise to avoid certain movements. No further cardiac work up planned at this time unless clinical status changes. Call us back if needed. Will be available as needed. Will need to follow up in cardiology clinic this week for wound check. Thank you. Subjective: No new complaints. No CP or SOB \"wants to go home\" Objective:  
  
Patient Vitals for the past 8 hrs: 
 Temp Pulse Resp BP SpO2  
10/27/18 0902  61 19 118/66 96 % 10/27/18 0826 97.7 °F (36.5 °C) 63 23 136/56 97 % 10/27/18 0825    136/56  Patient Vitals for the past 96 hrs: 
 Weight 10/25/18 0700 230 lb 2.6 oz (104.4 kg) 10/24/18 1840 228 lb (103.4 kg) 10/24/18 0030 228 lb 13.4 oz (103.8 kg) 10/23/18 1958 240 lb (108.9 kg) Intake/Output Summary (Last 24 hours) at 10/27/2018 6929 Last data filed at 10/27/2018 7131 Gross per 24 hour Intake 1639.17 ml Output 1000 ml Net 639.17 ml Physical Exam: 
General:  alert, cooperative, no distress, appears stated age Neck:  No JVD Lungs:  clear to auscultation bilaterally Heart:  Bradycardic regular rhythm with occasional missed beats Abdomen:  abdomen is soft w Extremities:  Atraumatic, no edema Pacersite without hematoma or bleeding Data Review:  
 
Labs: Results:  
   
Chemistry Recent Labs 10/26/18 
0340 10/25/18 
1731 * 122*  145  
K 3.9 4.1 * 112* CO2 27 28 BUN 21* 19* CREA 1.13 1.36* CA 8.4* 8.1*  
MG 1.9  --   
AGAP 6 5 BUCR 19 14 CBC w/Diff Recent Labs 10/26/18 
0340 10/25/18 
1731 WBC 6.6 6.0  
RBC 4.04* 4.10* HGB 11.7* 11.6* HCT 35.4 36.3 * 101* GRANS 69 67 LYMPH 17* 22 EOS 1 3 Lipid Panel Lab Results Component Value Date/Time Cholesterol, total 279 (H) 03/20/2018 08:32 AM  
 HDL Cholesterol 52 03/20/2018 08:32 AM  
 LDL, calculated 188.4 (H) 03/20/2018 08:32 AM  
 VLDL, calculated 38.6 03/20/2018 08:32 AM  
 Triglyceride 193 (H) 03/20/2018 08:32 AM  
 CHOL/HDL Ratio 5.4 (H) 03/20/2018 08:32 AM  
  
Liver Enzymes No results for input(s): TP, ALB, TBIL, AP, SGOT, GPT in the last 72 hours. No lab exists for component: DBIL Thyroid Studies Lab Results Component Value Date/Time  TSH 1.67 03/20/2018 08:32 AM

## 2018-10-27 NOTE — DISCHARGE INSTRUCTIONS
DISCHARGE SUMMARY from Nurse    PATIENT INSTRUCTIONS:    After general anesthesia or intravenous sedation, for 24 hours or while taking prescription Narcotics:  · Limit your activities  · Do not drive and operate hazardous machinery  · Do not make important personal or business decisions  · Do  not drink alcoholic beverages  · If you have not urinated within 8 hours after discharge, please contact your surgeon on call. Report the following to your surgeon:  · Excessive pain, swelling, redness or odor of or around the surgical area  · Temperature over 100.5  · Nausea and vomiting lasting longer than 4 hours or if unable to take medications  · Any signs of decreased circulation or nerve impairment to extremity: change in color, persistent  numbness, tingling, coldness or increase pain  · Any questions    What to do at Home:      *  Please give a list of your current medications to your Primary Care Provider. *  Please update this list whenever your medications are discontinued, doses are      changed, or new medications (including over-the-counter products) are added. *  Please carry medication information at all times in case of emergency situations. These are general instructions for a healthy lifestyle:    No smoking/ No tobacco products/ Avoid exposure to second hand smoke  Surgeon General's Warning:  Quitting smoking now greatly reduces serious risk to your health. Obesity, smoking, and sedentary lifestyle greatly increases your risk for illness    A healthy diet, regular physical exercise & weight monitoring are important for maintaining a healthy lifestyle    You may be retaining fluid if you have a history of heart failure or if you experience any of the following symptoms:  Weight gain of 3 pounds or more overnight or 5 pounds in a week, increased swelling in our hands or feet or shortness of breath while lying flat in bed.   Please call your doctor as soon as you notice any of these symptoms; do not wait until your next office visit. Recognize signs and symptoms of STROKE:    F-face looks uneven    A-arms unable to move or move unevenly    S-speech slurred or non-existent    T-time-call 911 as soon as signs and symptoms begin-DO NOT go       Back to bed or wait to see if you get better-TIME IS BRAIN. Warning Signs of HEART ATTACK     Call 911 if you have these symptoms:   Chest discomfort. Most heart attacks involve discomfort in the center of the chest that lasts more than a few minutes, or that goes away and comes back. It can feel like uncomfortable pressure, squeezing, fullness, or pain.  Discomfort in other areas of the upper body. Symptoms can include pain or discomfort in one or both arms, the back, neck, jaw, or stomach.  Shortness of breath with or without chest discomfort.  Other signs may include breaking out in a cold sweat, nausea, or lightheadedness. Don't wait more than five minutes to call 911 - MINUTES MATTER! Fast action can save your life. Calling 911 is almost always the fastest way to get lifesaving treatment. Emergency Medical Services staff can begin treatment when they arrive -- up to an hour sooner than if someone gets to the hospital by car. The discharge information has been reviewed with the patient. The patient verbalized understanding. Discharge medications reviewed with the patient and appropriate educational materials and side effects teaching were provided. Patient armband removed and shredded    ___________________________________________________________________________________________________________________________________  Disposition:  Will need follow-up with device/wound check in 7-10 days with primary cardiologist.    Restrictions: For affected arm:  No lifting greater than 10 lbs or lifting elbow above shoulder for 4 weeks. Keep incision clean and dry for a total of 72 hours after procedure.   Remove dressing in 24 hours if not already removed. Please remove the steristrips (small white adhesive strips over wound) after 7 days if they have not already fallen off. No hot tubs or pools for 2 weeks. OK to shower with \"pat\" dry incision after 72 hours. No driving ideally for 2 weeks due to concern for airbag. For affected arm, no lifting more than 10 pounds or lifting elbow above shoulder for 4 weeks. No driving ideally for 2 weeks due to concern for airbag. Keep incision clean and dry for a total of 72 hours after procedure. Remove dressing in 24 hours if not already removed. Please remove the steristrips (small white adhesive strips over wound) after 7 days if they have not already fallen off. No hot tubs or pools for 2 weeks. OK to shower with \"pat\" dry incision after 72 hours.

## 2018-10-27 NOTE — ROUTINE PROCESS
1900-Bedside shift change report given to Navi Dove RN (oncoming nurse) by Charleen Tavarez  (offgoing nurse). Report included the following information SBAR, Kardex and MAR. Patient alert and oriented x4, Lt. Chest wall drsg. C/d/i. No complaints of pain at this time. 2200-Patient complained of pain to lt. Upper chest wall. Pain medication given. 2330-Patient awake watching TV.  
 
0300-Patient sleeping at this time,no distress noted,  
 
 
0704-Medtronic in room to interrogate pacer. Tolerated well. 0730-. Bedside shift change report given to Charleen Tavarez (oncoming nurse) by Navi Dove RN (offgoing nurse). Report included the following information SBAR, Kardex and MAR. Patient's night was uneventful, Patient awake and alert, no distress noted

## 2018-10-27 NOTE — PROGRESS NOTES
0730-Received pt resting in bed with eyes open, no sign of distress, vss on room air with pacemaker, pacemaker interrogated this am, Dr. Bella Denson paged, ok for pt to go off tele w/o nurse for XRAY, Radiology notified and sending for transport, will continue to monitor 
0753-off floor for XRAY via wheelchair 0825-Returned to room via wheelchair, tolerated well 
1000-Pt seen by cardiology, ok to discharge home from cardiology standpoint if ok with hospitalist 
1300-Pt discharged via wheelchair with neighbor, tolerated well, no sign of distress, (See discharge instructions)

## 2018-10-27 NOTE — PROGRESS NOTES
Care Management Interventions PCP Verified by CM: Yes 
Palliative Care Criteria Met (RRAT>21 & CHF Dx)?: No 
Mode of Transport at Discharge: Other (see comment)(neice to assist) Transition of Care Consult (CM Consult): Discharge Planning MyChart Signup: No 
Discharge Durable Medical Equipment: No 
Health Maintenance Reviewed: Yes Physical Therapy Consult: No 
Occupational Therapy Consult: No 
Speech Therapy Consult: No 
Current Support Network: Lives Alone Confirm Follow Up Transport: Other (see comment) Plan discussed with Pt/Family/Caregiver: Yes [employer] Information Provided?: No 
Discharge Location Discharge Placement: Home

## 2018-10-28 NOTE — DISCHARGE SUMMARY
[address]    Carola Cervantes  MR#: [id number]  : 1945  ACCOUNT #: [de-identified]   ADMIT DATE: 10/23/2018  DISCHARGE DATE: 10/27/2018    ADMITTING DIAGNOSES:  Chest pain with Mobitz type 2 second-degree heart block in the setting of diabetes and hypertension. HISTORY OF PRESENT ILLNESS:  The patient is a 72-year-old female who presented to the emergency department with chest pain. She is known to have history of coronary artery disease with stents in the past.  She also has diabetes, hypertension, and hyperlipidemia. In the emergency room, she was also found to have type 2 heart block, which was Mobitz II, and she was admitted for ongoing management. HOSPITAL COURSE:  Cardiology consultation was obtained, and the patient was managed in the ICU. It was observed that she had multiple different types of arrhythmia. Cardiology recommended permanent pacemaker placement. The patient had permanent pacemaker placed, which she has tolerated well. She has a dual chamber pacemaker, at this point it is functioning appropriately. She has been monitored overnight and she has no evidence for arrhythmia or complication. At this point, she is considered ready for discharge. She is discharged home. DISCHARGE DIAGNOSES:  1. Type 2 Mobitz AV block with additional previous arrhythmias. 2.  Dual chamber pacemaker implantation, successful. 3.  Coronary artery disease. 4.  Hypertension. 5.  Hyperlipidemia. 6.  Diabetes. CONDITION: Improved. FOLLOWUP:  With her primary care provider in 1 week. Patient will arrange. DISCHARGE MEDICATIONS:  1. Acetaminophen 500 mg by mouth every 6 hours as needed for pain. 2.  Xanax 0.5 mg by mouth 3 times daily as needed for anxiety. 3.  Aspirin 325 mg by mouth daily. 4.  Lipitor 80 mg by mouth daily. 5.  Coreg 6.25 mg by mouth 2 times daily. 6.  Plavix 75 mg by mouth daily. 7.  Lexapro 10 mg by mouth daily.   8. Neurontin 300 mg by mouth 3 times daily. 9.  Hydrochlorothiazide 25 mg by mouth daily. 10.  Lantus 12 units subcutaneously daily. 11.  Nitroglycerin 0.4 mg sublingually every 5 minutes as needed for chest pain. DIET:  Diabetic. DISPOSITION:  Home. MD DANIEL Galeas/MERCEDES  D: 10/27/2018 11:33     T: 10/28/2018 03:31  JOB #: [id number]

## 2018-10-29 ENCOUNTER — HOSPITAL ENCOUNTER (EMERGENCY)
Age: 73
Discharge: HOME | End: 2018-10-29
Attending: EMERGENCY MEDICINE
Payer: MEDICARE

## 2018-10-29 VITALS
SYSTOLIC BLOOD PRESSURE: 137 MMHG | DIASTOLIC BLOOD PRESSURE: 55 MMHG | WEIGHT: 243 LBS | TEMPERATURE: 97.7 F | RESPIRATION RATE: 12 BRPM | BODY MASS INDEX: 41.48 KG/M2 | OXYGEN SATURATION: 100 % | HEIGHT: 64 IN | HEART RATE: 60 BPM

## 2018-10-29 DIAGNOSIS — G89.18 ACUTE POSTOPERATIVE PAIN: Primary | ICD-10-CM

## 2018-10-29 LAB
ANION GAP SERPL CALC-SCNC: 10 MMOL/L (ref 3–18)
BASOPHILS # BLD: 0 K/UL (ref 0–0.1)
BASOPHILS NFR BLD: 0 % (ref 0–2)
BUN SERPL-MCNC: 30 MG/DL (ref 7–18)
BUN/CREAT SERPL: 27 (ref 12–20)
CALCIUM SERPL-MCNC: 8.2 MG/DL (ref 8.5–10.1)
CHLORIDE SERPL-SCNC: 110 MMOL/L (ref 100–108)
CO2 SERPL-SCNC: 22 MMOL/L (ref 21–32)
CREAT SERPL-MCNC: 1.11 MG/DL (ref 0.6–1.3)
DIFFERENTIAL METHOD BLD: ABNORMAL
EOSINOPHIL # BLD: 0.1 K/UL (ref 0–0.4)
EOSINOPHIL NFR BLD: 1 % (ref 0–5)
ERYTHROCYTE [DISTWIDTH] IN BLOOD BY AUTOMATED COUNT: 13 % (ref 11.6–14.5)
GLUCOSE SERPL-MCNC: 127 MG/DL (ref 74–99)
HCT VFR BLD AUTO: 43.3 % (ref 35–45)
HGB BLD-MCNC: 14.3 G/DL (ref 12–16)
LYMPHOCYTES # BLD: 1.4 K/UL (ref 0.9–3.6)
LYMPHOCYTES NFR BLD: 21 % (ref 21–52)
MCH RBC QN AUTO: 29.4 PG (ref 24–34)
MCHC RBC AUTO-ENTMCNC: 33 G/DL (ref 31–37)
MCV RBC AUTO: 89.1 FL (ref 74–97)
MONOCYTES # BLD: 0.7 K/UL (ref 0.05–1.2)
MONOCYTES NFR BLD: 11 % (ref 3–10)
NEUTS SEG # BLD: 4.4 K/UL (ref 1.8–8)
NEUTS SEG NFR BLD: 67 % (ref 40–73)
PLATELET # BLD AUTO: 30 K/UL (ref 135–420)
PMV BLD AUTO: 11.4 FL (ref 9.2–11.8)
POTASSIUM SERPL-SCNC: 4.9 MMOL/L (ref 3.5–5.5)
RBC # BLD AUTO: 4.86 M/UL (ref 4.2–5.3)
SODIUM SERPL-SCNC: 142 MMOL/L (ref 136–145)
WBC # BLD AUTO: 6.7 K/UL (ref 4.6–13.2)

## 2018-10-29 PROCEDURE — 74011250637 HC RX REV CODE- 250/637: Performed by: EMERGENCY MEDICINE

## 2018-10-29 PROCEDURE — 85025 COMPLETE CBC W/AUTO DIFF WBC: CPT | Performed by: EMERGENCY MEDICINE

## 2018-10-29 PROCEDURE — 99285 EMERGENCY DEPT VISIT HI MDM: CPT

## 2018-10-29 PROCEDURE — 80048 BASIC METABOLIC PNL TOTAL CA: CPT | Performed by: EMERGENCY MEDICINE

## 2018-10-29 PROCEDURE — 93005 ELECTROCARDIOGRAM TRACING: CPT

## 2018-10-29 RX ORDER — TRAMADOL HYDROCHLORIDE 50 MG/1
50 TABLET ORAL
Qty: 8 TAB | Refills: 0 | Status: SHIPPED | OUTPATIENT
Start: 2018-10-29 | End: 2018-11-01

## 2018-10-29 RX ORDER — ACETAMINOPHEN 500 MG
1000 TABLET ORAL
Qty: 50 TAB | Refills: 0 | Status: SHIPPED | OUTPATIENT
Start: 2018-10-29

## 2018-10-29 RX ORDER — ACETAMINOPHEN 500 MG
1000 TABLET ORAL
Status: COMPLETED | OUTPATIENT
Start: 2018-10-29 | End: 2018-10-29

## 2018-10-29 RX ADMIN — ACETAMINOPHEN 1000 MG: 500 TABLET, FILM COATED ORAL at 22:09

## 2018-10-30 ENCOUNTER — PATIENT OUTREACH (OUTPATIENT)
Dept: FAMILY MEDICINE CLINIC | Age: 73
End: 2018-10-30

## 2018-10-30 LAB
ATRIAL RATE: 67 BPM
CALCULATED R AXIS, ECG10: -70 DEGREES
CALCULATED T AXIS, ECG11: 84 DEGREES
DIAGNOSIS, 93000: NORMAL
P-R INTERVAL, ECG05: 226 MS
Q-T INTERVAL, ECG07: 490 MS
QRS DURATION, ECG06: 184 MS
QTC CALCULATION (BEZET), ECG08: 517 MS
VENTRICULAR RATE, ECG03: 67 BPM

## 2018-10-30 NOTE — DISCHARGE INSTRUCTIONS
Acute Pain After Surgery: Care Instructions  Your Care Instructions    It's common to have some pain after surgery. Pain doesn't mean that something is wrong or that the surgery didn't go well. But when the pain is severe, it's important to work with your doctor to manage it. It's also important to be aware of a few facts about pain and pain medicine. · You are the only person who knows what your pain feels like. So be sure to tell your doctor when you are in pain or when the pain changes. Then he or she will know how to adjust your medicines. · Pain is often easier to control right after it starts. So it may be better to take regular doses of pain medicine and not wait until the pain gets bad. · Medicine can help control pain. But this doesn't mean you'll have no pain. Medicine works to keep the pain at a level you can live with. With time, you will feel better. Follow-up care is a key part of your treatment and safety. Be sure to make and go to all appointments, and call your doctor if you are having problems. It's also a good idea to know your test results and keep a list of the medicines you take. How can you care for yourself at home? · Be safe with medicines. Read and follow all instructions on the label. ? If the doctor gave you a prescription medicine for pain, take it as prescribed. ? If you are not taking a prescription pain medicine, ask your doctor if you can take an over-the-counter medicine. · If you take an over-the-counter pain medicine, such as acetaminophen (Tylenol), ibuprofen (Advil, Motrin), or naproxen (Aleve), read and follow all instructions on the label. · Do not take two or more pain medicines at the same time unless the doctor told you to. · Do not drink alcohol while you are taking pain medicines. · Try to walk each day if your doctor recommends it. Start by walking a little more than you did the day before. Bit by bit, increase the amount you walk.  Walking increases blood flow. It also helps prevent pneumonia and constipation. · To prevent constipation from opioid pain medicines:  ? Talk to your doctor about a laxative. ? Include fruits, vegetables, beans, and whole grains in your diet each day. These foods are high in fiber. ? Drink plenty of fluids, enough so that your urine is light yellow or clear like water. Drink water, fruit juice, or other drinks that do not contain caffeine or alcohol. If you have kidney, heart, or liver disease and have to limit fluids, talk with your doctor before you increase the amount of fluids you drink. ? Take a fiber supplement, such as Citrucel or Metamucil, every day if needed. Read and follow all instructions on the label. If you take pain medicine for more than a few days, talk to your doctor before you take fiber. When should you call for help? Call your doctor now or seek immediate medical care if:    · Your pain gets worse.     · Your pain is not controlled by medicine.    Watch closely for changes in your health, and be sure to contact your doctor if you have any problems. Where can you learn more? Go to http://maureen-satish.info/. Enter (09) 278-917 in the search box to learn more about \"Acute Pain After Surgery: Care Instructions. \"  Current as of: November 20, 2017  Content Version: 11.8  © 2694-7027 [employer]. Care instructions adapted under license by [employer] (which disclaims liability or warranty for this information). If you have questions about a medical condition or this instruction, always ask your healthcare professional. [place] [address] any warranty or liability for your use of this information.

## 2018-10-30 NOTE — PROGRESS NOTES
[facility] Follow Up Note 
-10/23/18 Admitted at [facility] for chest pain, 2nd degree heart block, s/p pacemaker 
-10/27/18 Discharged to home   
-10/30/18 NN contact 
 
 
 
 
-Attempted to reach patient. Unable to reach. Left message on voicemail requesting call back. Patient's PCP appt info also provided. Contact info provided 
 
 
-Called and spoke to patient's niece, Ruchi Becker (listed on PHI). Introduced self and role. She reports patient is doing well. Informed her regarding patient's PCP appt on 11/01/18 at 9am.  She is aware and reports someone will be taking the patient to the appt. Informed her regarding patient's f/u with Cardio. She is requesting NN to schedule patient's appt for her pacemaker check. She states patient will need a few days advance to notify her transportation. NN will schedule and contact her back. Advised for patient to bring all her meds to the PCP appt for review. She verbalized understanding. Reviewed red flags and she understands when to contact PCP office or seek emergency assistance 
 
-Called Cardio office pacemaker Wound check appt scheduled on 11/06/18 at 10am 
 
 
Will try to reach patient again at a later time

## 2018-10-30 NOTE — ED PROVIDER NOTES
Lowell Salcido is a 72 y.o. Female who was just d/c after getting pacemaker placed by dr Laura Harris with c/o increased pain at site, concern for possible infection. No sob, other cp, fever, drainage. Taking tylenol but has not taken today. Taking her other meds as directed. Sx worse with movement, palpation The history is provided by the patient and medical records. Past Medical History:  
Diagnosis Date  Arthritis  Bilateral cataracts   
 sees Dr. Mira Banks  COPD  Coronary artery disease Cx - 2.75 x 33mm XIENCE 10/13  Diabetes  Dyslipidemia  Hypertension  MI (myocardial infarction) ([facility] [address]  Mobitz type 2 second degree atrioventricular block 10/23/2018  Noncompliance  Obesity  Pacemaker 10/2018  
 dual chamber Medtronic pacemaker for second degere AVB  TIA (transient ischemic attack) 3/20/2018 Past Surgical History:  
Procedure Laterality Date  HX CORONARY STENT PLACEMENT  2013 Family History:  
Problem Relation Age of Onset [place].Schwab Arthritis-osteo Mother  Heart Disease Mother  Alcohol abuse Neg Hx  Asthma Neg Hx  Bleeding Prob Neg Hx  Cancer Neg Hx  Diabetes Neg Hx  Elevated Lipids Neg Hx   
 Headache Neg Hx  Hypertension Neg Hx  Lung Disease Neg Hx  Migraines Neg Hx  Psychiatric Disorder Neg Hx  Stroke Neg Hx  Mental Retardation Neg Hx Social History Socioeconomic History  Marital status: UNKNOWN Spouse name: Not on file  Number of children: Not on file  Years of education: Not on file  Highest education level: Not on file Social Needs  Financial resource strain: Not on file  Food insecurity - worry: Not on file  Food insecurity - inability: Not on file  Transportation needs - medical: Not on file  Transportation needs - non-medical: Not on file Occupational History  Not on file Tobacco Use  Smoking status: Former Smoker   Packs/day: 1.00  
 Years: 54.00 Pack years: 54.00 Start date: 2/10/2014  Smokeless tobacco: Never Used  Tobacco comment: pt states she uses e-cigs to stop smoking Substance and Sexual Activity  Alcohol use: No  
 Drug use: No  
 Sexual activity: Not Currently Other Topics Concern  Not on file Social History Narrative  Not on file ALLERGIES: Patient has no known allergies. Review of Systems Constitutional: Negative for fever. HENT: Negative for trouble swallowing. Respiratory: Negative for shortness of breath. Cardiovascular: Negative for leg swelling. Gastrointestinal: Negative for abdominal pain. Genitourinary: Negative for difficulty urinating. Musculoskeletal: Negative for gait problem. Skin: Positive for wound. Neurological: Negative for syncope. Hematological: Bruises/bleeds easily. Psychiatric/Behavioral: Positive for sleep disturbance. Vitals:  
 10/29/18 2053 10/29/18 2059 10/29/18 2121 BP:   144/60 Pulse:   62 Resp:   20 Temp:  97.7 °F (36.5 °C) SpO2:   100% Weight: 110.2 kg (243 lb) Height: 5' 4\" (1.626 m) Physical Exam  
Constitutional: She appears well-developed and well-nourished. No distress. HENT:  
Head: Normocephalic and atraumatic. Eyes: EOM are normal. Pupils are equal, round, and reactive to light. Neck: Neck supple. Cardiovascular: Normal rate, regular rhythm, normal heart sounds and intact distal pulses. Pulmonary/Chest: Effort normal and breath sounds normal. She exhibits tenderness. Skin: She is not diaphoretic. Nursing note and vitals reviewed. MDM Procedures Vitals: 
Patient Vitals for the past 12 hrs: 
 Temp Pulse Resp BP SpO2  
10/29/18 2245  60 12 137/55 100 % 10/29/18 2230  63 16 133/52 99 % 10/29/18 2215  67 18 131/55 99 % 10/29/18 2200  67 17 138/59 99 % 10/29/18 2145  61 20 116/69 99 % 10/29/18 2130  64 14 129/57 100 % 10/29/18 2121  62 20 144/60 100 % 10/29/18 2115  63 19 144/60 99 % 10/29/18 2059 97.7 °F (36.5 °C)     Medications ordered:  
Medications  
acetaminophen (TYLENOL) tablet 1,000 mg (1,000 mg Oral Given 10/29/18 2209) Lab findings: 
Recent Results (from the past 12 hour(s)) EKG, 12 LEAD, INITIAL Collection Time: 10/29/18 10:05 PM  
Result Value Ref Range Ventricular Rate 67 BPM  
 Atrial Rate 67 BPM  
 P-R Interval 226 ms  
 QRS Duration 184 ms Q-T Interval 490 ms QTC Calculation (Bezet) 517 ms Calculated R Axis -70 degrees Calculated T Axis 84 degrees Diagnosis Sinus rhythm with 1st degree AV block Left axis deviation Left ventricular hypertrophy with QRS widening and repolarization abnormality Possible Lateral infarct , age undetermined Inferior infarct , age undetermined Abnormal ECG When compared with ECG of 24-OCT-2018 06:48, 
RI interval has decreased Right bundle branch block is no longer present Borderline criteria for Lateral infarct are now present CBC WITH AUTOMATED DIFF Collection Time: 10/29/18 10:28 PM  
Result Value Ref Range WBC 6.7 4.6 - 13.2 K/uL  
 RBC 4.86 4.20 - 5.30 M/uL  
 HGB 14.3 12.0 - 16.0 g/dL HCT 43.3 35.0 - 45.0 % MCV 89.1 74.0 - 97.0 FL  
 MCH 29.4 24.0 - 34.0 PG  
 MCHC 33.0 31.0 - 37.0 g/dL  
 RDW 13.0 11.6 - 14.5 % PLATELET 30 (L) 483 - 420 K/uL MPV 11.4 9.2 - 11.8 FL  
 NEUTROPHILS 67 40 - 73 % LYMPHOCYTES 21 21 - 52 % MONOCYTES 11 (H) 3 - 10 % EOSINOPHILS 1 0 - 5 % BASOPHILS 0 0 - 2 %  
 ABS. NEUTROPHILS 4.4 1.8 - 8.0 K/UL  
 ABS. LYMPHOCYTES 1.4 0.9 - 3.6 K/UL  
 ABS. MONOCYTES 0.7 0.05 - 1.2 K/UL  
 ABS. EOSINOPHILS 0.1 0.0 - 0.4 K/UL  
 ABS. BASOPHILS 0.0 0.0 - 0.1 K/UL  
 DF AUTOMATED METABOLIC PANEL, BASIC Collection Time: 10/29/18 10:28 PM  
Result Value Ref Range Sodium 142 136 - 145 mmol/L Potassium 4.9 3.5 - 5.5 mmol/L  Chloride 110 (H) 100 - 108 mmol/L  
 CO2 22 21 - 32 mmol/L Anion gap 10 3.0 - 18 mmol/L Glucose 127 (H) 74 - 99 mg/dL BUN 30 (H) 7.0 - 18 MG/DL Creatinine 1.11 0.6 - 1.3 MG/DL  
 BUN/Creatinine ratio 27 (H) 12 - 20 GFR est AA 59 (L) >60 ml/min/1.73m2 GFR est non-AA 48 (L) >60 ml/min/1.73m2 Calcium 8.2 (L) 8.5 - 10.1 MG/DL  
 
 
EKG interpretation by ED Physician: 
Sinus with paced rhythm. No acute st tw changes Rate 67, pr 236, qtc 517 
rbbb no longer present. Mobitz,II no longer present X-Ray, CT or other radiology findings or impressions: No orders to display Progress notes, Consult notes or additional Procedure notes:  
Doubt acute infection, need for other testing. Left message on cardiology consult line to arrange for f/u this week I have discussed with patient and/or family/sig other the results, interpretation of any imaging if performed, suspected diagnosis and treatment plan to include instructions regarding the diagnoses listed to which understanding was expressed with all questions answered Reevaluation of patient:  
stable Disposition: 
Diagnosis: 1. Acute postoperative pain Disposition: home Follow-up Information Follow up With Specialties Details Why Contact Info Niurka Warren MD Cardiology, Internal Medicine Call office in the morning to arrange appointment this week for recheck of the pacemaker site [address] [place] [address] [address] Cardiovascular Specialists [facility] [id number] 
[phone] [facility] EMERGENCY DEPT Emergency Medicine  If symptoms worsen [address] 
[phone] Medication List  
  
START taking these medications   
traMADol 50 mg tablet Commonly known as:  ULTRAM 
Take 1 Tab by mouth every six (6) hours as needed for Pain. Max Daily Amount: 200 mg. CONTINUE taking these medications   
acetaminophen 500 mg tablet Commonly known as:  [address] [facility] [address] 
 Take 2 Tabs by mouth every six (6) hours as needed for Pain. ASK your doctor about these medications ALPRAZolam 0.5 mg tablet Commonly known as:  [place] Take 1 Tab by mouth three (3) times daily as needed for Anxiety. Max Daily Amount: 1.5 mg. 
  
aspirin 325 mg tablet Commonly known as:  ASPIRIN Take 1 Tab by mouth daily. atorvastatin 80 mg tablet Commonly known as:  LIPITOR Take 1 Tab by mouth nightly. Blood-Glucose Meter monitoring kit Test blood glucose 4 times a day. carvedilol 6.25 mg tablet Commonly known as:  Jole Pollo Take 1 Tab by mouth two (2) times daily (with meals). clopidogrel 75 mg Tab Commonly known as:  PLAVIX Take 1 Tab by mouth daily. escitalopram oxalate 10 mg tablet Commonly known as:  [place] TAKE 1 TABLET BY MOUTH DAILY 
  
gabapentin 300 mg capsule Commonly known as:  NEURONTIN Take 1 Cap by mouth three (3) times daily. glucose 4 gram chewable tablet Take 4 tablets by mouth as needed (hypoglcemia). glucose blood VI test strips strip Commonly known as:  ASCENSIA AUTODISC VI, ONE TOUCH ULTRA TEST VI 
by Does Not Apply route See Admin Instructions. Use tid 
  
hydroCHLOROthiazide 25 mg tablet Commonly known as:  HYDRODIURIL Take 1 Tab by mouth daily. Insulin Needles (Disposable) 31 gauge x 1/4\" Ndle Commonly known as:  LITE TOUCH INSULIN PEN NEEDLES 
1 Each by Does Not Apply route five (5) times daily. Lancets Misc Use tid LANTUS SOLOSTAR U-100 INSULIN 100 unit/mL (3 mL) Inpn Generic drug:  insulin glargine INJECT 12 UNITS SUBCUTANEOUSLY DAILY 
  
nitroglycerin 0.4 mg SL tablet Commonly known as:  NITROSTAT 
1 Tab by SubLINGual route every five (5) minutes as needed for Chest Pain. NovoLOG Flexpen U-100 Insulin 100 unit/mL Inpn Generic drug:  insulin aspart U-100 INJECT 3 UNITS SUBCUTANEOUSLY BEFORE BREAKFAST, LUNCH AND DINNER (DISCARD AND BEGIN A NEW PEN AFTER 28 DAYS) Where to Get Your Medications Information about where to get these medications is not yet available Ask your nurse or doctor about these medications · acetaminophen 500 mg tablet · traMADol 50 mg tablet

## 2018-10-30 NOTE — ED NOTES
I have reviewed discharge instructions with the patient. The patient verbalized understanding. Patient armband removed and shredded Pt taken in wheel chair were she is awaiting for family .

## 2018-10-31 ENCOUNTER — PATIENT OUTREACH (OUTPATIENT)
Dept: FAMILY MEDICINE CLINIC | Age: 73
End: 2018-10-31

## 2018-10-31 NOTE — PROGRESS NOTES
Nurse Navigator Hospital Follow Up Note 
-10/23/18 Admitted at [facility] for chest pain, 2nd degree heart block, s/p pacemaker 
-10/27/18 Discharged to home   
-10/30/18 NN contact Hospital Discharge Follow-Up Date/Time:  10/31/2018 11:47 AM 
 
Patient was admitted to [facility] on 10/23/18 and discharged on 10/27/18 for chest pain, 2nd degree heart block, s/p pacemaker. The physician discharge summary was available at the time of outreach. Patient's niece was contacted within 2 business days of discharge. Top Challenges reviewed with the provider  
-2nd degree heart block, f/u with Cardio Dr. Javier Choe 18  
-s/p pace maker, f/u Dr. Berto Steel 
-DM, A1C = 9.1 (18) on lantus and novolog  
-neuropathy, on gabapentin Method of communication with provider : in person Inpatient RRAT score: 32 Was this a readmission? no  
Patient stated reason for the readmission: n/a Nurse Navigator (NN) contacted the patient 10/31/18 by telephone to perform post hospital discharge assessment. Verified name and  with patient as identifiers. Provided introduction to self, and explanation of the Nurse Navigator role. Pt reports she is doing good. Pt denies any CP, SOB, NVD, fever/chills, bleeding, swelling, pain. Pt reports dizziness when she is lying down with her head turned to the right. Pt reports numbness/tingling in her feet due to her neuropathy. Pt reports glucose this morning was 110. Reviewed discharge instructions and red flags with patient who verbalized understanding. Patient given an opportunity to ask questions and does not have any further questions or concerns at this time. The patient agrees to contact the PCP office for questions related to their healthcare. NN provided contact information for future reference. Disease Specific:   N/A Summary of patient's top problems: 
1. 2nd degree heart block 2. DM 
3. neuropathy Home Health orders at discharge: none [address]: n/a Date of initial visit: n/a Durable Medical Equipment ordered/company: n/a Durable Medical Equipment received: n/a Barriers to care? Transportation- relies on logistic care and family/friends to take her. Financial- pt has limited income Advance Care Planning:  
Does patient have an Advance Directive:  not on file Medication(s):  
New Medications at Discharge: carvedilol Changed Medications at Discharge: n/a Discontinued Medications at Discharge: n/a Medication reconciliation was performed with patient, who verbalizes understanding of administration of home medications. There were no barriers to obtaining medications identified at this time. Pt reports she has ASA 81mg, but not the 325mg. She is awaiting on when her money comes in in a few days to refill her 325mg. Will clarify with PCP regarding dosage. Pt prefers to use the [facility] for her refills. -pt reports taking 30 units with Lantus daily and 10 units each mean for Novolog Referral to Pharm D needed: no  
 
Current Outpatient Medications Medication Sig  carvedilol (COREG) 6.25 mg tablet Take 1 Tab by mouth two (2) times daily (with meals).  escitalopram oxalate (LEXAPRO) 10 mg tablet TAKE 1 TABLET BY MOUTH DAILY  gabapentin (NEURONTIN) 300 mg capsule Take 1 Cap by mouth three (3) times daily.  atorvastatin (LIPITOR) 80 mg tablet Take 1 Tab by mouth nightly.  clopidogrel (PLAVIX) 75 mg tab Take 1 Tab by mouth daily.  Lancets misc Use tid  glucose blood VI test strips (ASCENSIA AUTODISC VI, ONE TOUCH ULTRA TEST VI) strip by Does Not Apply route See Admin Instructions. Use tid  Insulin Needles, Disposable, (LITE TOUCH INSULIN PEN NEEDLES) 31 gauge x 1/4\" ndle 1 Each by Does Not Apply route five (5) times daily.  acetaminophen (TYLENOL EXTRA STRENGTH) 500 mg tablet Take 2 Tabs by mouth every six (6) hours as needed for Pain.  traMADol (ULTRAM) 50 mg tablet Take 1 Tab by mouth every six (6) hours as needed for Pain. Max Daily Amount: 200 mg.  
 NOVOLOG FLEXPEN U-100 INSULIN 100 unit/mL inpn INJECT 3 UNITS SUBCUTANEOUSLY BEFORE BREAKFAST, LUNCH AND DINNER (DISCARD AND BEGIN A NEW PEN AFTER 28 DAYS)  LANTUS SOLOSTAR U-100 INSULIN 100 unit/mL (3 mL) inpn INJECT 12 UNITS SUBCUTANEOUSLY DAILY  ALPRAZolam (XANAX) 0.5 mg tablet Take 1 Tab by mouth three (3) times daily as needed for Anxiety. Max Daily Amount: 1.5 mg.  
 hydroCHLOROthiazide (HYDRODIURIL) 25 mg tablet Take 1 Tab by mouth daily.  Blood-Glucose Meter monitoring kit Test blood glucose 4 times a day.  aspirin (ASPIRIN) 325 mg tablet Take 1 Tab by mouth daily.  glucose 4 gram chewable tablet Take 4 tablets by mouth as needed (hypoglcemia).  nitroglycerin (NITROSTAT) 0.4 mg SL tablet 1 Tab by SubLINGual route every five (5) minutes as needed for Chest Pain. No current facility-administered medications for this visit. There are no discontinued medications. [facility] follow up appointment(s):  
Future Appointments Date Time Provider Cara Ovalles 11/1/2018  9:00 AM Shirlene Ricketts MD [facility]  
11/6/2018 10:00 AM Csi, Bp/Ekg [facility] [address] [facility]  
PCP appt verified tomorrow, logistic care will be taking patient. Pt also aware of her pacemaker check scheduled for 11/06/18 and will be arranging transportation. Pt prefers to be called back and leave a message on her voicemail with Cardio appt info 
 
-Called patient back and left message on voicemail with her Cardio appt and their address/contact number [facility] follow up appointment(s): n/a [facility]:  n/a  
 
Goals  Prevent complications post hospitalization. -pt will attend PCP appt on 11/01/18 
-pt will attend pace maker check appt on 11/06/18 
-pt will take all prescribed meds as directed 
-pt will contact pharmacy/PCP office for any refills needed -pt will check glucose daily  Understands red flags post discharge. -pt will recognize red flags (CP, SOB, numbness/tingling, bleeding, swelling, fever/chills, pain, palpitations, HA/dizziness) and report to PCP/Cardio office or seek emergency assistance

## 2018-11-01 ENCOUNTER — OFFICE VISIT (OUTPATIENT)
Dept: FAMILY MEDICINE CLINIC | Age: 73
End: 2018-11-01

## 2018-11-01 VITALS
WEIGHT: 231 LBS | BODY MASS INDEX: 39.44 KG/M2 | HEIGHT: 64 IN | RESPIRATION RATE: 20 BRPM | SYSTOLIC BLOOD PRESSURE: 135 MMHG | OXYGEN SATURATION: 99 % | DIASTOLIC BLOOD PRESSURE: 71 MMHG | HEART RATE: 62 BPM | TEMPERATURE: 96.2 F

## 2018-11-01 DIAGNOSIS — I11.9 BENIGN HYPERTENSIVE HEART DISEASE WITHOUT HEART FAILURE: ICD-10-CM

## 2018-11-01 DIAGNOSIS — H81.11 BENIGN PAROXYSMAL POSITIONAL VERTIGO OF RIGHT EAR: ICD-10-CM

## 2018-11-01 DIAGNOSIS — E11.40 TYPE 2 DIABETES MELLITUS WITH DIABETIC NEUROPATHY, WITH LONG-TERM CURRENT USE OF INSULIN (HCC): ICD-10-CM

## 2018-11-01 DIAGNOSIS — R60.0 LOWER EXTREMITY EDEMA: ICD-10-CM

## 2018-11-01 DIAGNOSIS — Z28.21 REFUSED INFLUENZA VACCINE: ICD-10-CM

## 2018-11-01 DIAGNOSIS — E66.01 CLASS 2 SEVERE OBESITY DUE TO EXCESS CALORIES WITH SERIOUS COMORBIDITY AND BODY MASS INDEX (BMI) OF 39.0 TO 39.9 IN ADULT (HCC): ICD-10-CM

## 2018-11-01 DIAGNOSIS — Z95.0 S/P PLACEMENT OF CARDIAC PACEMAKER: Primary | ICD-10-CM

## 2018-11-01 DIAGNOSIS — F41.9 ANXIETY: ICD-10-CM

## 2018-11-01 DIAGNOSIS — Z79.4 TYPE 2 DIABETES MELLITUS WITH DIABETIC NEUROPATHY, WITH LONG-TERM CURRENT USE OF INSULIN (HCC): ICD-10-CM

## 2018-11-01 RX ORDER — ALPRAZOLAM 0.5 MG/1
0.5 TABLET ORAL
Qty: 20 TAB | Refills: 0 | Status: SHIPPED | OUTPATIENT
Start: 2018-11-01

## 2018-11-01 RX ORDER — HYDROCHLOROTHIAZIDE 25 MG/1
25 TABLET ORAL DAILY
Qty: 90 TAB | Refills: 3 | Status: SHIPPED | OUTPATIENT
Start: 2018-11-01

## 2018-11-01 NOTE — PATIENT INSTRUCTIONS
Epley Maneuver at Home for Vertigo: Exercises Your Care Instructions Vertigo is a spinning or whirling sensation when you move your head. Your doctor may have moved you in different positions to help your vertigo get better faster. This is called the Epley maneuver. Your doctor also may have asked you to do these exercises at home. Do the exercises as often as your doctor recommends. If your vertigo is getting worse, your doctor may have you change the exercise or stop it. Step 1 Step 1 1. Sit on the edge of a bed or sofa. Step 2 1. Turn your head 45 degrees in the direction your doctor told you to. This should be toward the ear that causes the most vertigo for you. In this picture, the woman is turning toward her left ear. Step 3 1. Tilt yourself backward until you are lying on your back. Your head should still be at a 45-degree turn. Your head should be about midway between looking straight ahead and looking out to your side. Hold for 30 seconds. If you have vertigo, stay in this position until it stops. Step 4 1. Turn your head 90 degrees toward the ear that has the least vertigo. In this picture, the woman is turning to the right because she has vertigo on her left side. The point of your chin should be raised and over your shoulder. Hold for 30 seconds. Step 5 1. Roll onto the side with the least vertigo. You should now be looking at the floor. Hold for 30 seconds. Follow-up care is a key part of your treatment and safety. Be sure to make and go to all appointments, and call your doctor if you are having problems. It's also a good idea to know your test results and keep a list of the medicines you take. Where can you learn more? Go to http://maureen-satish.info/. Enter [id number] in the search box to learn more about \"Epley Maneuver at Home for Vertigo: Exercises. \" Current as of: June 4, 2018 Content Version: 11.8 © 9630-6947 [employer]. Care instructions adapted under license by [employer] (which disclaims liability or warranty for this information). If you have questions about a medical condition or this instruction, always ask your healthcare professional. [address] any warranty or liability for your use of this information.

## 2018-11-01 NOTE — PROGRESS NOTES
Chief Complaint Patient presents with  
[facility] Follow Up 1. Have you been to the ER, urgent care clinic since your last visit? Hospitalized since your last visit? Yes When: 10/29/2018 Where: [facility] Reason for visit: chest pain - pace maker placed 2. Have you seen or consulted any other health care providers outside of the [address] [place] [address] since your last visit? Include any pap smears or colon screening.  No

## 2018-11-01 NOTE — PROGRESS NOTES
Sera Zee is a 72 y.o.  female and presents with Chief Complaint Patient presents with  
[facility] Follow Up  
 
-10/23/18 Admitted at [facility] for chest pain, 2nd degree heart block, s/p pacemaker 
-10/27/18 Discharged to home   
-10/30/18 [facility] contact Subjective: Ms. Libby Romano presents for transition of care after hospitalization for chest pain, 2nd degree heart block now s/p pacemeaker. She denies pain at surgical site but is having pruritic symptoms. Cardiovascular Review: 
The patient has diabetes, hypertension, hyperlipidemia, coronary artery disease, obesity and pacemaker. Diet and Lifestyle: generally follows a low fat low cholesterol diet, generally follows a low sodium diet, follows a diabetic diet regularly, sedentary, nonsmoker Home BP Monitoring: is not measured at home. Pertinent ROS: taking medications as instructed, no medication side effects noted, no TIA's, notes stable dyspnea on exertion, no change, no swelling of ankles. Diabetes Mellitus: 
She has diabetes mellitus. Diabetic ROS - medication compliance: compliant all of the time, diabetic diet compliance: compliant most of the time, home glucose monitoring: values are usually normal.  
Lab review: labs reviewed, I note that glycosylated hemoglobin mildly abnormal but acceptable. Patient Active Problem List  
Diagnosis Code  Diabetes E11.9  Hypertension I11.9  Dyslipidemia E78.5  Coronary artery disease I25.10  Uncontrolled diabetes mellitus ([address] E11.65  Chest pain R07.9  Stroke ([facility] I63.9  Obesity, morbid ([facility] E66.01  
 Type 2 diabetes with nephropathy (HCC) E11.21  
 Type 2 diabetes mellitus with diabetic neuropathy (HCC) E11.40  Advance care planning Z71.89  
 Mobitz type 2 second degree atrioventricular block I44.1  Pacemaker Z95.0 Patient Active Problem List  
 Diagnosis Date Noted  Pacemaker 10/26/2018  Robin type 2 second degree atrioventricular block 10/23/2018  Advance care planning 06/04/2018  Obesity, morbid ([facility] 03/29/2018  Type 2 diabetes with nephropathy ([facility] 03/29/2018  Type 2 diabetes mellitus with diabetic neuropathy ([facility] 03/29/2018  Stroke ([facility] 03/20/2018  Chest pain 12/08/2014  Uncontrolled diabetes mellitus ([facility] 03/25/2014  Diabetes  Hypertension  Dyslipidemia  Coronary artery disease Current Outpatient Medications Medication Sig Dispense Refill  acetaminophen (TYLENOL EXTRA STRENGTH) 500 mg tablet Take 2 Tabs by mouth every six (6) hours as needed for Pain. 50 Tab 0  carvedilol (COREG) 6.25 mg tablet Take 1 Tab by mouth two (2) times daily (with meals). 60 Tab 1  
 NOVOLOG FLEXPEN U-100 INSULIN 100 unit/mL inpn INJECT 3 UNITS SUBCUTANEOUSLY BEFORE BREAKFAST, LUNCH AND DINNER (DISCARD AND BEGIN A NEW PEN AFTER 28 DAYS) 15 mL 0  
 LANTUS SOLOSTAR U-100 INSULIN 100 unit/mL (3 mL) inpn INJECT 12 UNITS SUBCUTANEOUSLY DAILY  15 mL 0  
 escitalopram oxalate (LEXAPRO) 10 mg tablet TAKE 1 TABLET BY MOUTH DAILY 30 Tab 0  
 gabapentin (NEURONTIN) 300 mg capsule Take 1 Cap by mouth three (3) times daily. 270 Cap 3  
 ALPRAZolam (XANAX) 0.5 mg tablet Take 1 Tab by mouth three (3) times daily as needed for Anxiety. Max Daily Amount: 1.5 mg. 20 Tab 0  
 atorvastatin (LIPITOR) 80 mg tablet Take 1 Tab by mouth nightly. 90 Tab 3  clopidogrel (PLAVIX) 75 mg tab Take 1 Tab by mouth daily. 90 Tab 3  Lancets misc Use tid 1 Package 11  
 glucose blood VI test strips (ASCENSIA AUTODISC VI, ONE TOUCH ULTRA TEST VI) strip by Does Not Apply route See Admin Instructions. Use tid 200 Strip 6  Blood-Glucose Meter monitoring kit Test blood glucose 4 times a day. 1 Kit 0  
 Insulin Needles, Disposable, (LITE TOUCH INSULIN PEN NEEDLES) 31 gauge x 1/4\" ndle 1 Each by Does Not Apply route five (5) times daily.  200 Pen Needle 6  
  aspirin (ASPIRIN) 325 mg tablet Take 1 Tab by mouth daily. (Patient taking differently: Take 81 mg by mouth daily.) 30 Tab 0  
 glucose 4 gram chewable tablet Take 4 tablets by mouth as needed (hypoglcemia). 100 tablet 1  
 traMADol (ULTRAM) 50 mg tablet Take 1 Tab by mouth every six (6) hours as needed for Pain. Max Daily Amount: 200 mg. 8 Tab 0  
 hydroCHLOROthiazide (HYDRODIURIL) 25 mg tablet Take 1 Tab by mouth daily. 30 Tab 2  
 nitroglycerin (NITROSTAT) 0.4 mg SL tablet 1 Tab by SubLINGual route every five (5) minutes as needed for Chest Pain. 1 Bottle 2 No Known Allergies Past Medical History:  
Diagnosis Date  Arthritis  Bilateral cataracts   
 sees Dr. Tiki Burdick  COPD  Coronary artery disease Cx - 2.75 x 33mm XIENCE 10/13  Diabetes  Dyslipidemia  Hypertension  [facility]) [facility]  Mobitz type 2 second degree atrioventricular block 10/23/2018  Noncompliance  Obesity  Pacemaker 10/2018  
 dual chamber Medtronic pacemaker for second degere AVB  TIA (transient ischemic attack) 3/20/2018 Past Surgical History:  
Procedure Laterality Date  HX CORONARY STENT PLACEMENT  2013 Family History Problem Relation Age of Onset Belle Arthritis-osteo Mother  Heart Disease Mother  Alcohol abuse Neg Hx  Asthma Neg Hx  Bleeding Prob Neg Hx  Cancer Neg Hx  Diabetes Neg Hx  Elevated Lipids Neg Hx   
 Headache Neg Hx  Hypertension Neg Hx  Lung Disease Neg Hx  Migraines Neg Hx  Psychiatric Disorder Neg Hx  Stroke Neg Hx  Mental Retardation Neg Hx Social History Tobacco Use  Smoking status: Former Smoker Packs/day: 1.00 Years: 54.00 Pack years: 54.00 Start date: 2/10/2014  Smokeless tobacco: Never Used  Tobacco comment: pt states she uses e-cigs to stop smoking Substance Use Topics  Alcohol use: No  
 
 
ROS General ROS: positive for  - chills negative for - fatigue or fever Psychological ROS: positive for - anxiety Ophthalmic ROS: positive for - blurry vision ENT ROS: negative for - headaches Endocrine ROS: negative for - polydipsia/polyuria or temperature intolerance Respiratory ROS: no cough, shortness of breath, or wheezing Gastrointestinal ROS: no abdominal pain, change in bowel habits, or black or bloody stools Genito-Urinary ROS: no dysuria, trouble voiding, or hematuria Musculoskeletal ROS: positive for - pain in knee - bilateral 
Neurological ROS: no TIA or stroke symptoms; + vertigo Dermatological ROS: positive for dry skin All other systems reviewed and are negative. Objective: 
Vitals:  
 11/01/18 0932 11/01/18 1010 BP: 168/61 135/71 Pulse: 62 Resp: 20 Temp: 96.2 °F (35.7 °C) TempSrc: Oral   
SpO2: 99% Weight: 231 lb (104.8 kg) Height: 5' 4\" (1.626 m) PainSc:   0 - No pain   
 
 
alert, well appearing, and in no distress, oriented to person, place, and time and obese Mental status - normal mood, behavior, speech, dress, motor activity, and thought processes Chest - clear to auscultation, no wheezes, rales or rhonchi, symmetric air entry Heart - normal rate, regular rhythm, normal S1, S2, no murmurs, rubs, clicks or gallops Skin - well healing site of pacemaker LABS  
GFR 48 TESTS Chest xray IMPRESSION: 
  
Left chest cardiac pacemaker in good position Assessment/Plan:   
1. S/P placement of cardiac pacemaker F/u with cardiology 2. Benign paroxysmal positional vertigo of right ear Exercises demonstrated 3. Type 2 diabetes mellitus with diabetic neuropathy, with long-term current use of insulin ([address] Goal hgb a1c <7; borderline controlled; continue current treatment 4. Hypertension Goal <130/80; take medications as prescribed 
- hydroCHLOROthiazide (HYDRODIURIL) 25 mg tablet; Take 1 Tab by mouth daily. Dispense: 90 Tab; Refill: 3 5. Class 2 severe obesity due to excess calories with serious comorbidity and body mass index (BMI) of 39.0 to 39.9 in adult [facility]) I have reviewed/discussed the above normal BMI with the patient and caregiver. I have recommended the following interventions: dietary management education, guidance, and counseling and monitor weight . Deion Tijerina 6. Refused influenza vaccine 7. Anxiety Relaxation techniques and BZD with caution - ALPRAZolam (XANAX) 0.5 mg tablet; Take 1 Tab by mouth three (3) times daily as needed for Anxiety. Max Daily Amount: 1.5 mg.  Dispense: 20 Tab; Refill: 0 
 
8. Lower extremity edema Start mild diuretic 
- hydroCHLOROthiazide (HYDRODIURIL) 25 mg tablet; Take 1 Tab by mouth daily. Dispense: 90 Tab; Refill: 3 Lab review: labs reviewed, I note that basic metabolic panel mildly abnormal but acceptable I have discussed the diagnosis with the patient and the intended plan as seen in the above orders. The patient has received an after-visit summary and questions were answered concerning future plans. I have discussed medication side effects and warnings with the patient as well. I have reviewed the plan of care with the patient, accepted their input and they are in agreement with the treatment goals. Follow-up Disposition: 
Return in about 3 months (around 2/1/2019) for medication evaluation.

## 2018-11-06 ENCOUNTER — OFFICE VISIT (OUTPATIENT)
Dept: CARDIOLOGY CLINIC | Age: 73
End: 2018-11-06

## 2018-11-06 DIAGNOSIS — Z95.0 STATUS POST PLACEMENT OF CARDIAC PACEMAKER: Primary | ICD-10-CM

## 2018-11-06 NOTE — PROGRESS NOTES
Patient had a pacemaker implant and is here for her wound check. Incision healing well, no redness, tenderness or swelling. All steri strips have come off. She does has some bruising on the left breast, she states is getting better. Patient scheduled to come and see Dr. Jaxon Roman for post hospital follow up in 3 weeks.

## 2018-11-27 RX ORDER — INSULIN GLARGINE 100 [IU]/ML
INJECTION, SOLUTION SUBCUTANEOUS
Qty: 15 ML | Refills: 0 | Status: SHIPPED | OUTPATIENT
Start: 2018-11-27 | End: 2019-01-29 | Stop reason: SDUPTHER

## 2018-11-27 RX ORDER — INSULIN ASPART 100 [IU]/ML
INJECTION, SOLUTION INTRAVENOUS; SUBCUTANEOUS
Qty: 15 ML | Refills: 0 | Status: SHIPPED | OUTPATIENT
Start: 2018-11-27 | End: 2019-01-25 | Stop reason: SDUPTHER

## 2018-12-12 ENCOUNTER — CLINICAL SUPPORT (OUTPATIENT)
Dept: CARDIOLOGY CLINIC | Age: 73
End: 2018-12-12

## 2018-12-12 DIAGNOSIS — I44.1 MOBITZ TYPE 2 SECOND DEGREE ATRIOVENTRICULAR BLOCK: ICD-10-CM

## 2018-12-12 DIAGNOSIS — Z95.0 PACEMAKER: ICD-10-CM

## 2018-12-12 DIAGNOSIS — I25.119 ATHEROSCLEROSIS OF NATIVE CORONARY ARTERY WITH ANGINA PECTORIS, UNSPECIFIED WHETHER NATIVE OR TRANSPLANTED HEART (HCC): Primary | ICD-10-CM

## 2018-12-17 DIAGNOSIS — E11.40 TYPE 2 DIABETES MELLITUS WITH DIABETIC NEUROPATHY, WITH LONG-TERM CURRENT USE OF INSULIN (HCC): ICD-10-CM

## 2018-12-17 DIAGNOSIS — Z79.4 TYPE 2 DIABETES MELLITUS WITH DIABETIC NEUROPATHY, WITH LONG-TERM CURRENT USE OF INSULIN (HCC): ICD-10-CM

## 2018-12-17 DIAGNOSIS — I25.119 ATHEROSCLEROSIS OF NATIVE CORONARY ARTERY WITH ANGINA PECTORIS, UNSPECIFIED WHETHER NATIVE OR TRANSPLANTED HEART (HCC): ICD-10-CM

## 2018-12-18 NOTE — TELEPHONE ENCOUNTER
Please see message below. Patient on last refill from [facility]. Please advise.  Thank you

## 2018-12-19 RX ORDER — GABAPENTIN 300 MG/1
300 CAPSULE ORAL 3 TIMES DAILY
Qty: 270 CAP | Refills: 3 | Status: SHIPPED | OUTPATIENT
Start: 2018-12-19

## 2018-12-19 RX ORDER — CLOPIDOGREL BISULFATE 75 MG/1
75 TABLET ORAL DAILY
Qty: 90 TAB | Refills: 3 | Status: SHIPPED | OUTPATIENT
Start: 2018-12-19 | End: 2019-06-13 | Stop reason: SDUPTHER

## 2019-01-14 DIAGNOSIS — F41.9 ANXIETY: ICD-10-CM

## 2019-01-14 DIAGNOSIS — I11.9 BENIGN HYPERTENSIVE HEART DISEASE WITHOUT HEART FAILURE: ICD-10-CM

## 2019-01-14 DIAGNOSIS — R60.0 LOWER EXTREMITY EDEMA: ICD-10-CM

## 2019-01-14 RX ORDER — ESCITALOPRAM OXALATE 10 MG/1
TABLET ORAL
Qty: 30 TAB | Refills: 0 | Status: SHIPPED | OUTPATIENT
Start: 2019-01-14 | End: 2019-05-24 | Stop reason: SDUPTHER

## 2019-01-14 RX ORDER — HYDROCHLOROTHIAZIDE 25 MG/1
25 TABLET ORAL DAILY
Qty: 90 TAB | Refills: 3 | Status: CANCELLED | OUTPATIENT
Start: 2019-01-14

## 2019-01-21 RX ORDER — CARVEDILOL 6.25 MG/1
6.25 TABLET ORAL 2 TIMES DAILY WITH MEALS
Qty: 60 TAB | Refills: 1 | Status: SHIPPED | OUTPATIENT
Start: 2019-01-21 | End: 2019-04-22 | Stop reason: SDUPTHER

## 2019-01-26 RX ORDER — INSULIN ASPART 100 [IU]/ML
INJECTION, SOLUTION INTRAVENOUS; SUBCUTANEOUS
Qty: 15 ML | Refills: 0 | Status: SHIPPED | OUTPATIENT
Start: 2019-01-26 | End: 2019-04-01 | Stop reason: SDUPTHER

## 2019-01-30 RX ORDER — INSULIN GLARGINE 100 [IU]/ML
INJECTION, SOLUTION SUBCUTANEOUS
Qty: 15 ML | Refills: 0 | Status: SHIPPED | OUTPATIENT
Start: 2019-01-30 | End: 2019-04-09 | Stop reason: SDUPTHER

## 2019-03-13 ENCOUNTER — OFFICE VISIT (OUTPATIENT)
Dept: CARDIOLOGY CLINIC | Age: 74
End: 2019-03-13

## 2019-03-13 DIAGNOSIS — I44.1 MOBITZ TYPE 2 SECOND DEGREE ATRIOVENTRICULAR BLOCK: Primary | ICD-10-CM

## 2019-03-13 DIAGNOSIS — Z95.0 PACEMAKER: ICD-10-CM

## 2019-03-28 NOTE — PROGRESS NOTES
I have personally seen and evaluated the device findings. Interrogation reviewed and I agree with assessment.     Svetlana Mo

## 2019-04-02 RX ORDER — INSULIN ASPART 100 [IU]/ML
INJECTION, SOLUTION INTRAVENOUS; SUBCUTANEOUS
Qty: 15 ML | Refills: 0 | Status: SHIPPED | OUTPATIENT
Start: 2019-04-02 | End: 2020-11-11 | Stop reason: SDUPTHER

## 2019-04-09 RX ORDER — INSULIN GLARGINE 100 [IU]/ML
INJECTION, SOLUTION SUBCUTANEOUS
Qty: 15 ML | Refills: 0 | Status: SHIPPED | OUTPATIENT
Start: 2019-04-09 | End: 2019-08-20 | Stop reason: SDUPTHER

## 2019-04-22 NOTE — TELEPHONE ENCOUNTER
Requested Prescriptions     Pending Prescriptions Disp Refills    carvedilol (COREG) 6.25 mg tablet 60 Tab 1     Sig: Take 1 Tab by mouth two (2) times daily (with meals).

## 2019-04-23 RX ORDER — CARVEDILOL 6.25 MG/1
6.25 TABLET ORAL 2 TIMES DAILY WITH MEALS
Qty: 60 TAB | Refills: 1 | Status: SHIPPED | OUTPATIENT
Start: 2019-04-23 | End: 2019-06-21 | Stop reason: SDUPTHER

## 2019-05-23 DIAGNOSIS — F41.9 ANXIETY: ICD-10-CM

## 2019-05-23 NOTE — TELEPHONE ENCOUNTER
Please see refill request, appointment required?  Please advise, thank you    Last appointment: 11/01/18  Next appointment: unknown

## 2019-05-24 RX ORDER — ESCITALOPRAM OXALATE 10 MG/1
TABLET ORAL
Qty: 30 TAB | Refills: 0 | Status: SHIPPED | OUTPATIENT
Start: 2019-05-24 | End: 2019-06-20 | Stop reason: SDUPTHER

## 2019-06-13 DIAGNOSIS — I25.119 ATHEROSCLEROSIS OF NATIVE CORONARY ARTERY WITH ANGINA PECTORIS, UNSPECIFIED WHETHER NATIVE OR TRANSPLANTED HEART (HCC): ICD-10-CM

## 2019-06-13 NOTE — TELEPHONE ENCOUNTER
Pt called in and requested a refill of her blood thinner and she is also wanting to know if the nurse could give her a call back in regards to if she would be okay without her blood thinner while she is waiting for her refill. She is about to be out. Pt needs a call back. Please advise.

## 2019-06-17 NOTE — TELEPHONE ENCOUNTER
Please see medication refill request. Patient stated that she is contacting [address] regarding refills. Please advise. Thank you.

## 2019-06-18 RX ORDER — CLOPIDOGREL BISULFATE 75 MG/1
75 TABLET ORAL DAILY
Qty: 90 TAB | Refills: 3 | Status: SHIPPED | OUTPATIENT
Start: 2019-06-18 | End: 2020-07-01

## 2019-06-20 DIAGNOSIS — F41.9 ANXIETY: ICD-10-CM

## 2019-06-20 RX ORDER — ESCITALOPRAM OXALATE 10 MG/1
TABLET ORAL
Qty: 30 TAB | Refills: 0 | Status: SHIPPED | OUTPATIENT
Start: 2019-06-20 | End: 2019-07-13 | Stop reason: SDUPTHER

## 2019-06-24 RX ORDER — CARVEDILOL 6.25 MG/1
TABLET ORAL
Qty: 120 TAB | Refills: 1 | Status: SHIPPED | OUTPATIENT
Start: 2019-06-24 | End: 2019-10-17 | Stop reason: SDUPTHER

## 2019-07-13 DIAGNOSIS — F41.9 ANXIETY: ICD-10-CM

## 2019-07-15 RX ORDER — ESCITALOPRAM OXALATE 10 MG/1
TABLET ORAL
Qty: 30 TAB | Refills: 0 | Status: SHIPPED | OUTPATIENT
Start: 2019-07-15 | End: 2019-10-02 | Stop reason: SDUPTHER

## 2019-08-23 RX ORDER — INSULIN GLARGINE 100 [IU]/ML
INJECTION, SOLUTION SUBCUTANEOUS
Qty: 15 ML | Refills: 0 | Status: SHIPPED | OUTPATIENT
Start: 2019-08-23 | End: 2020-11-03

## 2019-10-02 DIAGNOSIS — F41.9 ANXIETY: ICD-10-CM

## 2019-10-02 RX ORDER — ESCITALOPRAM OXALATE 10 MG/1
TABLET ORAL
Qty: 30 TAB | Refills: 0 | Status: SHIPPED | OUTPATIENT
Start: 2019-10-02 | End: 2019-11-04 | Stop reason: SDUPTHER

## 2019-10-17 RX ORDER — CARVEDILOL 6.25 MG/1
TABLET ORAL
Qty: 180 TAB | Refills: 1 | Status: SHIPPED | OUTPATIENT
Start: 2019-10-17 | End: 2020-03-20

## 2019-10-22 NOTE — PROGRESS NOTES
[facility] - Progress Note Admit Date: 10/23/2018 Patient seen and examined independently. Patient has symptomatic type 2- 2nd degree AV block. Pacer planned for tomorrow. No complaints this morning. Agree with assessment and plan as noted below. Bonnie Hawthorne MD 
Assessment:  
 
Hospital Problems  Date Reviewed: 2/27/2014 Codes Class Noted POA * (Principal) Mobitz type 2 second degree atrioventricular block ICD-10-CM: I44.1 ICD-9-CM: 426.12  10/23/2018 Unknown Type 2 diabetes with nephropathy ([facility] ICD-10-CM: E11.21 
ICD-9-CM: 250.40, 583.81  3/29/2018 Yes Chest pain ICD-10-CM: R07.9 ICD-9-CM: 786.50  12/8/2014 Unknown Hypertension ICD-10-CM: I11.9 ICD-9-CM: 402.10  Unknown Yes Dyslipidemia ICD-10-CM: E78.5 ICD-9-CM: 272.4  Unknown Yes Coronary artery disease ICD-10-CM: I25.10 ICD-9-CM: 414.01  Unknown Yes Overview Signed 2/27/2014 10:05 AM by Gabriel Dumont Cx - 2.75 x 33mm XIENCE 10/13  
  
  
   
  
 
 
 
- Symptomatic bradycardia: Intermittent Second degree type I and II noted on EKG and telemetry with RBBB - Chest pain, negative cardiac enzymes x3 
- CAD with LUZ MARIA to Cx and OM in 10/2013 
- Echo 3/2018 with EF 75%, grade 2 DD 
- Echo 10/24/18 with EF 60%, no wall motion abnormality, LA cavity size mildly dilated, mild TR 
- Hx of CVA in March 2018: MRI at the time showed an acute posterior lateral right thalamic lacunar infarct.  
- Diabetes Mellitus - Hypertension - Dyslipidemia - Obesity Plan: - For PPM tomorrow, NPO after MN 
- Amlodipine for additional BP Control, avoid AV lalito blocking agents Subjective: No new complaints. No dizziness or CP Objective:  
  
Patient Vitals for the past 8 hrs: 
 Temp Pulse Resp BP SpO2  
10/25/18 1100  (!) 52 17 164/66 100 % 10/25/18 1000  (!) 55 16 174/67 99 % 10/25/18 0900  (!) 50 15 199/68 99 % 10/25/18 0800 97.8 °F (36.6 °C) (!) 46 15 182/62 98 % 10/25/18 0700  (!) 46 22 163/63 99 % 10/25/18 0600  (!) 44 16 154/56 99 % 10/25/18 0500  (!) 47 13 160/76 97 % 10/25/18 0400 98.7 °F (37.1 °C) (!) 51 13 161/61 98 % Patient Vitals for the past 96 hrs: 
 Weight 10/25/18 0700 230 lb 2.6 oz (104.4 kg) 10/24/18 1840 228 lb (103.4 kg) 10/24/18 0030 228 lb 13.4 oz (103.8 kg) 10/23/18 1958 240 lb (108.9 kg) Intake/Output Summary (Last 24 hours) at 10/25/2018 1127 Last data filed at 10/25/2018 1001 Gross per 24 hour Intake 1660 ml Output 1750 ml Net -90 ml Physical Exam: 
General:  alert, cooperative, no distress Neck:  nontender, no JVD Lungs:  clear to auscultation bilaterally Heart:  regular rate and rhythm, S1, S2 normal, no murmur, click, rub or gallop Abdomen:  abdomen is soft without significant tenderness, masses, organomegaly or guarding Extremities:  extremities normal, atraumatic, no cyanosis or edema Data Review:  
 
Labs: Results:  
   
Chemistry Recent Labs 10/24/18 
0500 10/23/18 
2008 * 187* * 146*  
K 3.8 4.2 * 113* CO2 27 27 BUN 22* 20* CREA 1.05 1.22  
CA 8.2* 8.0*  
MG  --  1.9 AGAP 5 6 BUCR 21* 16  
AP  --  116 TP  --  5.9* ALB  --  2.6*  
GLOB  --  3.3 AGRAT  --  0.8 CBC w/Diff Recent Labs 10/23/18 
2008 WBC 5.5  
RBC 4.13* HGB 11.7* HCT 35.9 * GRANS 67 LYMPH 19* EOS 1 Cardiac Enzymes Lab Results Component Value Date/Time TROIQ 0.02 10/24/2018 11:35 AM  
  
Coagulation No results for input(s): PTP, INR, APTT in the last 72 hours. No lab exists for component: INREXT Lipid Panel Lab Results Component Value Date/Time  Cholesterol, total 279 (H) 03/20/2018 08:32 AM  
 HDL Cholesterol 52 03/20/2018 08:32 AM  
 LDL, calculated 188.4 (H) 03/20/2018 08:32 AM  
 VLDL, calculated 38.6 03/20/2018 08:32 AM  
 Triglyceride 193 (H) 03/20/2018 08:32 AM  
 CHOL/HDL Ratio 5.4 (H) 03/20/2018 08:32 AM  
  
 BNP No results found for: BNP, BNPP, XBNPT Liver Enzymes Recent Labs 10/23/18 
2008 TP 5.9* ALB 2.6*  SGOT 19  
  
Digoxin Thyroid Studies Lab Results Component Value Date/Time TSH 1.67 03/20/2018 08:32 AM  
    
 
Signed By: Danne Dakin. Yoly Denton PA-C October 25, 2018 Principal Discharge DX:	Anxiety

## 2019-11-04 DIAGNOSIS — F41.9 ANXIETY: ICD-10-CM

## 2019-11-04 RX ORDER — ESCITALOPRAM OXALATE 10 MG/1
10 TABLET ORAL DAILY
Qty: 90 TAB | Refills: 0 | Status: SHIPPED | OUTPATIENT
Start: 2019-11-04 | End: 2020-01-08

## 2019-11-04 NOTE — TELEPHONE ENCOUNTER
Requested Prescriptions     Pending Prescriptions Disp Refills    escitalopram oxalate (LEXAPRO) 10 mg tablet 30 Tab 0     Patient is also requesting a 3 month supply if possible.

## 2020-01-08 DIAGNOSIS — F41.9 ANXIETY: ICD-10-CM

## 2020-01-08 RX ORDER — ESCITALOPRAM OXALATE 10 MG/1
TABLET ORAL
Qty: 90 TAB | Refills: 0 | Status: SHIPPED | OUTPATIENT
Start: 2020-01-08 | End: 2020-05-15 | Stop reason: SDUPTHER

## 2020-01-24 NOTE — Clinical Note
History and physical documented and up to date, allergies reviewed, lab results reviewed, pre-procedure education provided, patient verbalized understanding of procedure, procedural consent signed and patient is NPO. Please review chart and place future labs for upcoming lab appt

## 2020-03-20 RX ORDER — CARVEDILOL 6.25 MG/1
TABLET ORAL
Qty: 180 TAB | Refills: 1 | Status: SHIPPED | OUTPATIENT
Start: 2020-03-20 | End: 2020-09-03 | Stop reason: SDUPTHER

## 2020-05-15 DIAGNOSIS — F41.9 ANXIETY: ICD-10-CM

## 2020-05-15 RX ORDER — ESCITALOPRAM OXALATE 10 MG/1
TABLET ORAL
Qty: 90 TAB | Refills: 0 | Status: SHIPPED | OUTPATIENT
Start: 2020-05-15 | End: 2020-07-22 | Stop reason: SDUPTHER

## 2020-06-30 DIAGNOSIS — I25.119 ATHEROSCLEROSIS OF NATIVE CORONARY ARTERY WITH ANGINA PECTORIS, UNSPECIFIED WHETHER NATIVE OR TRANSPLANTED HEART (HCC): ICD-10-CM

## 2020-07-01 RX ORDER — CLOPIDOGREL BISULFATE 75 MG/1
TABLET ORAL
Qty: 90 TAB | Refills: 3 | Status: SHIPPED | OUTPATIENT
Start: 2020-07-01

## 2020-07-22 ENCOUNTER — OFFICE VISIT (OUTPATIENT)
Dept: FAMILY MEDICINE CLINIC | Age: 75
End: 2020-07-22

## 2020-07-22 ENCOUNTER — HOSPITAL ENCOUNTER (OUTPATIENT)
Dept: LAB | Age: 75
Discharge: HOME | End: 2020-07-22
Payer: MEDICARE

## 2020-07-22 VITALS
WEIGHT: 243 LBS | DIASTOLIC BLOOD PRESSURE: 82 MMHG | BODY MASS INDEX: 41.48 KG/M2 | HEIGHT: 64 IN | RESPIRATION RATE: 16 BRPM | TEMPERATURE: 97.5 F | SYSTOLIC BLOOD PRESSURE: 148 MMHG | HEART RATE: 90 BPM | OXYGEN SATURATION: 96 %

## 2020-07-22 DIAGNOSIS — E66.01 CLASS 2 SEVERE OBESITY DUE TO EXCESS CALORIES WITH SERIOUS COMORBIDITY AND BODY MASS INDEX (BMI) OF 39.0 TO 39.9 IN ADULT (HCC): ICD-10-CM

## 2020-07-22 DIAGNOSIS — E11.40 TYPE 2 DIABETES MELLITUS WITH DIABETIC NEUROPATHY, WITH LONG-TERM CURRENT USE OF INSULIN (HCC): ICD-10-CM

## 2020-07-22 DIAGNOSIS — Z01.818 PRE-OP EXAM: ICD-10-CM

## 2020-07-22 DIAGNOSIS — N18.30 STAGE 3 CHRONIC KIDNEY DISEASE (HCC): ICD-10-CM

## 2020-07-22 DIAGNOSIS — Z71.89 ADVANCE CARE PLANNING: ICD-10-CM

## 2020-07-22 DIAGNOSIS — F41.9 ANXIETY: ICD-10-CM

## 2020-07-22 DIAGNOSIS — Z11.59 ENCOUNTER FOR HEPATITIS C SCREENING TEST FOR LOW RISK PATIENT: ICD-10-CM

## 2020-07-22 DIAGNOSIS — Z79.4 TYPE 2 DIABETES MELLITUS WITH DIABETIC NEUROPATHY, WITH LONG-TERM CURRENT USE OF INSULIN (HCC): ICD-10-CM

## 2020-07-22 DIAGNOSIS — I11.9 BENIGN HYPERTENSIVE HEART DISEASE WITHOUT HEART FAILURE: ICD-10-CM

## 2020-07-22 DIAGNOSIS — I25.119 ATHEROSCLEROSIS OF NATIVE CORONARY ARTERY WITH ANGINA PECTORIS, UNSPECIFIED WHETHER NATIVE OR TRANSPLANTED HEART (HCC): ICD-10-CM

## 2020-07-22 DIAGNOSIS — Z00.00 MEDICARE ANNUAL WELLNESS VISIT, SUBSEQUENT: Primary | ICD-10-CM

## 2020-07-22 DIAGNOSIS — H25.013 CORTICAL AGE-RELATED CATARACT OF BOTH EYES: ICD-10-CM

## 2020-07-22 LAB
CHOLEST SERPL-MCNC: 296 MG/DL
EST. AVERAGE GLUCOSE BLD GHB EST-MCNC: 212 MG/DL
HBA1C MFR BLD: 9 % (ref 4.2–5.6)
HDLC SERPL-MCNC: 51 MG/DL (ref 40–60)
HDLC SERPL: 5.8 {RATIO} (ref 0–5)
LDLC SERPL CALC-MCNC: 212.2 MG/DL (ref 0–100)
LIPID PROFILE,FLP: ABNORMAL
TRIGL SERPL-MCNC: 164 MG/DL (ref ?–150)
VLDLC SERPL CALC-MCNC: 32.8 MG/DL

## 2020-07-22 PROCEDURE — 80061 LIPID PANEL: CPT

## 2020-07-22 PROCEDURE — 36415 COLL VENOUS BLD VENIPUNCTURE: CPT

## 2020-07-22 PROCEDURE — 86803 HEPATITIS C AB TEST: CPT

## 2020-07-22 PROCEDURE — 83036 HEMOGLOBIN GLYCOSYLATED A1C: CPT

## 2020-07-22 RX ORDER — ESCITALOPRAM OXALATE 10 MG/1
TABLET ORAL
Qty: 90 TAB | Refills: 3 | Status: SHIPPED | OUTPATIENT
Start: 2020-07-22

## 2020-07-22 NOTE — PROGRESS NOTES
(AWV) The Medicare Annual Wellness Exam PROGRESS NOTE    This is a Medicare Annual Wellness Exam (AWV)   I have reviewed the patient's medical history in detail and updated the computerized patient record. Radha Balbuena is a 69 y.o.  female and presents for an annual wellness exam     ROS   Constitutional: Negative for diaphoresis. HENT: Negative for congestion.    Eyes: Negative for discharge. Respiratory: Negative for stridor.    Cardiovascular: Negative for palpitations. Gastrointestinal: Negative for diarrhea. Genitourinary: Negative for flank pain. Musculoskeletal: Negative for back pain. Neurological: Positive for numbness. Negative for weakness. Psychiatric/Behavioral: Negative for hallucinations. All other systems reviewed and are negative.     History     Past Medical History:   Diagnosis Date    Arthritis     Bilateral cataracts     sees Dr. Vinay Laureano    COPD     Coronary artery disease     Cx - 2.75 x 33mm XIENCE 10/13     Diabetes     Dyslipidemia     Hypertension     MI (myocardial infarction) ([facility] [address]     Mobitz type 2 second degree atrioventricular block 10/23/2018    Noncompliance     Obesity     Pacemaker 10/2018    dual chamber Medtronic pacemaker for second degere AVB    TIA (transient ischemic attack) 3/20/2018      Past Surgical History:   Procedure Laterality Date    HX CORONARY STENT PLACEMENT  2013    HI INS NEW/RPLCMT PRM PM W/TRANSV ELTRD ATRIAL&VENT N/A 10/26/2018    INSERT PPM DUAL performed by Camryn Arboleda MD at [facility] CATH LAB     Current Outpatient Medications   Medication Sig Dispense Refill    clopidogreL (PLAVIX) 75 mg tab TAKE 1 TABLET EVERY DAY 90 Tab 3    escitalopram oxalate (LEXAPRO) 10 mg tablet TAKE 1 TABLET EVERY DAY 90 Tab 0    carvediloL (COREG) 6.25 mg tablet TAKE 1 TABLET TWICE DAILY  WITH  MEALS 180 Tab 1    LANTUS SOLOSTAR U-100 INSULIN 100 unit/mL (3 mL) inpn INJECT 12 UNITS SUBCUTANEOUSLY DAILY  15 mL 0    NOVOLOG FLEXPEN U-100 INSULIN 100 unit/mL inpn INJECT 3 UNITS SUBCUTANEOUSLY BEFORE BREAKFAST, LUNCH AND DINNER (DISCARD AND BEGIN A NEW PEN AFTER 28 DAYS) 15 mL 0    gabapentin (NEURONTIN) 300 mg capsule Take 1 Cap by mouth three (3) times daily. 270 Cap 3    ALPRAZolam (XANAX) 0.5 mg tablet Take 1 Tab by mouth three (3) times daily as needed for Anxiety. Max Daily Amount: 1.5 mg. 20 Tab 0    hydroCHLOROthiazide (HYDRODIURIL) 25 mg tablet Take 1 Tab by mouth daily. 90 Tab 3    acetaminophen (TYLENOL EXTRA STRENGTH) 500 mg tablet Take 2 Tabs by mouth every six (6) hours as needed for Pain. 50 Tab 0    atorvastatin (LIPITOR) 80 mg tablet Take 1 Tab by mouth nightly. 90 Tab 3    Lancets misc Use tid 1 Package 11    glucose blood VI test strips (ASCENSIA AUTODISC VI, ONE TOUCH ULTRA TEST VI) strip by Does Not Apply route See Admin Instructions. Use tid 200 Strip 6    Blood-Glucose Meter monitoring kit Test blood glucose 4 times a day. 1 Kit 0    Insulin Needles, Disposable, (LITE TOUCH INSULIN PEN NEEDLES) 31 gauge x 1/4\" ndle 1 Each by Does Not Apply route five (5) times daily. 200 Pen Needle 6    aspirin (ASPIRIN) 325 mg tablet Take 1 Tab by mouth daily. (Patient taking differently: Take 81 mg by mouth daily.) 30 Tab 0    glucose 4 gram chewable tablet Take 4 tablets by mouth as needed (hypoglcemia). 100 tablet 1    nitroglycerin (NITROSTAT) 0.4 mg SL tablet 1 Tab by SubLINGual route every five (5) minutes as needed for Chest Pain.  1 Bottle 2     No Known Allergies  Family History   Problem Relation Age of Onset    Arthritis-osteo Mother     Heart Disease Mother     Alcohol abuse Neg Hx     Asthma Neg Hx     Bleeding Prob Neg Hx     Cancer Neg Hx     Diabetes Neg Hx     Elevated Lipids Neg Hx     Headache Neg Hx     Hypertension Neg Hx     Lung Disease Neg Hx     Migraines Neg Hx     Psychiatric Disorder Neg Hx     Stroke Neg Hx     Mental Retardation Neg Hx      Social History     Tobacco Use    Smoking status: Former Smoker     Packs/day: 1.00     Years: 54.00     Pack years: 54.00     Start date: 2/10/2014    Smokeless tobacco: Never Used    Tobacco comment: pt states she uses e-cigs to stop smoking   Substance Use Topics    Alcohol use: No     Patient Active Problem List   Diagnosis Code    Diabetes E11.9    Hypertension I11.9    Dyslipidemia E78.5    Coronary artery disease I25.10    Uncontrolled diabetes mellitus ([facility] [address] E11.65    Chest pain R07.9    Stroke ([facility] I63.9    Obesity, morbid (HCC) E66.01    Type 2 diabetes with nephropathy (HCC) E11.21    Type 2 diabetes mellitus with diabetic neuropathy ([facility]) E11.40    Advance care planning Z71.89    Mobitz type 2 second degree atrioventricular block I44.1    Pacemaker Z95.0       Health Maintenance History  Immunizations reviewed, dtap due , pneumovax up to date, flu up to date, zoster due but pt had shingles  Colonoscopy: refused,   Eye exam: scheduled for cataract surgery  Mammo due  Dexascan up to date        Depression Risk Factor Screening:      Patient Health Questionnaire (PHQ-2)   Over the last 2 weeks, how often have you been bothered by any of the following problems? · Little interest or pleasure in doing things? · Not at all. [0]  · Feeling down, depressed, or hopeless? · Several days. [1]    Total Score: 1/6  PHQ-2 Assessment Scoring:   A score of 2 or more requires further screening with the PHQ-9    Alcohol Risk Factor Screening:     Women: On any occasion during the past 3 months, have you had more than 3 drinks containing alcohol?  no   Do you average more than 7 drinks per week?  no    Functional Ability and Level of Safety:     Hearing Loss    The patient needs further evaluation. Activities of Daily Living   Partial assistance.    Requires assistance with: no ADLs but with household chores    Fall Risk   History of fall(s) in the past 3 months (25 pts)  Impaired (20 pts)  Score: 45    Abuse Screen   Patient is not abused    Examination   Physical Examination  Vitals:    07/22/20 0830   BP: 148/82   Pulse: 90   Resp: 16   Temp: 97.5 °F (36.4 °C)   TempSrc: Oral   SpO2: 96%   Weight: 243 lb (110.2 kg)   Height: 5' 4\" (1.626 m)   PainSc:   0 - No pain      Body mass index is 41.71 kg/m². Evaluation of Cognitive Function:  Mood/affect:good mood with appropriate affect  Appearance: well kempt  Family member/caregiver input: she requires assistance in  Her home    alert, well appearing, and in no distress, oriented to person, place, and time and morbidly obese    Patient Care Team:  Kei Hurt MD as PCP - General (Family Medicine)  Kei Hurt MD as PCP - [facility] Empaneled Provider    End-of-life planning  Advanced Directive in the case than an injury or illness causes the patient to be unable to make health care decisions    Health Care Directive or Living Will: yes    Advice/Referrals/Counselling/Plan:   Education and counseling provided:  End-of-Life planning (with patient's consent)  Screening Mammography  Colorectal cancer screening tests  Medical nutrition therapy for individuals with diabetes or renal disease  Include in education list (weight loss, physical activity, smoking cessation, fall prevention, and nutrition)    ICD-10-CM ICD-9-CM    1. Medicare annual wellness visit, subsequent  Z00.00 V70.0 [id number] [employer]   2. Advance care planning  Z71.89 V65.49 ADVANCE CARE PLANNING FIRST 30 MINS   3. Type 2 diabetes mellitus with diabetic neuropathy, with long-term current use of insulin (HCC)  E11.40 250.60 HEMOGLOBIN A1C WITH EAG    Z79.4 357.2 LIPID PANEL     V58.67  DIABETES FOOT EXAM   4. Hypertension  I11.9 402.10    5. Anxiety  F41.9 300.00 escitalopram oxalate (LEXAPRO) 10 mg tablet   6. Encounter for hepatitis C screening test for low risk patient  Z11.59 V73.89 HEPATITIS C AB   7.  Class 2 severe obesity due to excess calories with serious comorbidity and body mass index (BMI) of 39.0 to 39.9 in adult ([facility]  E66.01 278.01     Z68.39 V85.39    8. Atherosclerosis of native coronary artery with angina pectoris, unspecified whether native or transplanted heart ([facility]  I25.119 414.01      413.9    . Brief written plan, checklist    I have discussed the diagnosis with the patient and the intended plan as seen in the above orders. The patient has received an after-visit summary and questions were answered concerning future plans. I have discussed medication side effects and warnings with the patient as well. I have reviewed the plan of care with the patient, accepted their input and they are in agreement with the treatment goals. ____________________________________________________________    Problem Assessment    for treatment of   Chief Complaint   Patient presents with    Pre-op Exam         SUBJECTIVE  She is here for pre-op exam; she will undergo cataract surgery of the right eye in 1 week  Hypertension  Patient is here for evaluation of hypertension.  Age at onset of elevated blood pressure:  52.  She indicates that she is has noted upper extremity weakness. She denies any symptoms referable to her elevated blood pressure. Specifically denies chest pain, palpitations, dyspnea, orthopnea, PND or peripheral edema. Current medication regimen is as listed   below. Patient has these side effects of medication: none. Cardiovascular risk factors: dyslipidemia, diabetes mellitus, obesity, sedentary life style, hypertension, post-menopausal Use of agents associated with hypertension: none History of renal disease: negative  History of flank trauma: negative     Cardiovascular Review:  The patient has diabetes, hypertension, hyperlipidemia and history of prior stroke.   Diet and Lifestyle: not attempting to follow a low fat, low cholesterol diet, not attempting to follow a low sodium diet, does not rigorously follow a diabetic diet, sedentary, nonsmoker  Home BP Monitoring: is not measured at home.  Pertinent ROS: taking medications as instructed, no medication side effects noted, no TIA's, no chest pain on exertion, no dyspnea on exertion, no swelling of ankles. Diabetes Mellitus:  She has diabetes mellitus, and  hypertension, hyperlipidemia, history of prior stroke and obesity. Diabetic ROS - medication compliance: compliant all of the time, diabetic diet compliance: compliant most of the time, home glucose monitoring: is performed sporadically. Lab review: labs reviewed, I note that glycosylated hemoglobin abnormal high.           Visit Vitals  /82 (BP 1 Location: Right arm, BP Patient Position: Sitting)   Pulse 90   Temp 97.5 °F (36.4 °C) (Oral)   Resp 16   Ht 5' 4\" (1.626 m)   Wt 243 lb (110.2 kg)   SpO2 96%   BMI 41.71 kg/m²     General:  Alert, cooperative, no distress, appears stated age. Head:  Normocephalic, without obvious abnormality, atraumatic. Eyes:  Conjunctivae/corneas clear. PERRL, EOMs intact. Fundi benign. Ears:  Normal TMs and external ear canals both ears. Nose: Nares normal. Septum midline. Mucosa normal. No drainage or sinus tenderness. Throat: Lips, mucosa, and tongue normal. Teeth and gums normal.   Neck: Supple, symmetrical, trachea midline, no adenopathy, thyroid: no enlargement/tenderness/nodules, no carotid bruit and no JVD. Back:   Symmetric, no curvature. ROM normal. No CVA tenderness. Lungs:   Clear to auscultation bilaterally. Chest wall:  No tenderness or deformity. Heart:  Regular rate and rhythm, S1, S2 normal, no murmur, click, rub or gallop. Breast Exam:  Not examined   Abdomen:   Soft, non-tender. Bowel sounds normal. No masses,  No organomegaly. Genitalia:  deferred   Rectal:  deferred   Extremities: Extremities normal, atraumatic, no cyanosis; 1+edema. Pulses: 2+ and symmetric all extremities. Skin: Skin color, texture, turgor normal. No rashes or lesions.    Lymph nodes: Cervical, supraclavicular, and axillary nodes normal. Neurologic: CNII-XII intact. Normal strength, sensation and reflexes throughout. Diabetic foot exam:     Left Foot:   Visual Exam: normal    Pulse DP: 2+ (normal)   Filament test: normal sensation       Right Foot:   Visual Exam: normal    Pulse DP: 2+ (normal)   Filament test: normal sensation       LABS   GFR 46  TESTS  Chest xray  Result Impression   Cardiac pacemaker without CHF or acute cardiopulmonary process. Assessment/Plan:    1. Medicare annual wellness visit, subsequent  Reviewed preventive recommendations  - [id number] [place] [address] [place]    2. Advance care planning  See acp  - ADVANCE CARE PLANNING FIRST 30 MINS    3. Type 2 diabetes mellitus with diabetic neuropathy, with long-term current use of insulin (HCC)  Goal hgb a1c <7  - HEMOGLOBIN A1C WITH EAG; Future  - LIPID PANEL; Future  -  DIABETES FOOT EXAM    4. Hypertension  Goal <130/80    5. Anxiety    - escitalopram oxalate (LEXAPRO) 10 mg tablet; TAKE 1 TABLET EVERY DAY  Dispense: 90 Tab; Refill: 3    6. Encounter for hepatitis C screening test for low risk patient    - HEPATITIS C AB; Future    7. Class 2 severe obesity due to excess calories with serious comorbidity and body mass index (BMI) of 39.0 to 39.9 in adult [facility])  Encourage healthy diet and exercise    8. Atherosclerosis of native coronary artery with angina pectoris, unspecified whether native or transplanted heart [facility])  F/u with cardiology    9.  Pre-op exam  Cleared for surgery    Lab review: labs reviewed, I note that renal functions abnormal, hemogram normal, orders written for new lab studies as appropriate; see orders

## 2020-07-22 NOTE — ACP (ADVANCE CARE PLANNING)
Advance Care Planning (ACP) Provider Note - Comprehensive      Date of ACP Conversation: 07/22/2020  Persons included in Conversation:  patient  Length of ACP Conversation in minutes:  16 minutes     Authorized Decision Maker (if patient is incapable of making informed decisions): This person is:  her niece, Ross Gutierrez                                                       General ACP for ALL Patients with Decision Making Capacity:   Importance of advance care planning, including choosing a healthcare agent to communicate patient's healthcare decisions if patient lost the ability to make decisions, such as after a sudden illness or accident  Understanding of the healthcare agent role was assessed and information provided  Exploration of values, goals, and preferences if recovery is not expected, even with continued medical treatment in the event of: Imminent death  Severe, permanent brain injury  \"In these circumstances, what matters most to you? \"  Care focused more on comfort or quality of life. \"What, if any, treatments would you want to avoid? \" none  Opportunity offered to explore how cultural, Mormonism, spiritual, or personal beliefs would affect decisions for future care      Review of Existing Advance Directive:  What information were you given about medical decisions to consider before completing your advance directive? none     For Serious or Chronic Illness:  Understanding of medical condition       Interventions Provided:  Recommended completion of Advance Directive form after review of ACP materials and conversation with prospective healthcare agent

## 2020-07-22 NOTE — PROGRESS NOTES
Frantz Villegas presents today for   Chief Complaint   Patient presents with    Pre-op Exam       Is someone accompanying this pt? Yes daughter    Is the patient using any DME equipment during [address]? no    Depression Screening:  3 most recent PHQ Screens 6/4/2018   PHQ Not Done -   Little interest or pleasure in doing things Not at all   Feeling down, depressed, irritable, or hopeless Not at all   Total Score PHQ 2 0       Learning Assessment:  Learning Assessment 2/27/2014   PRIMARY LEARNER Patient   PRIMARY LANGUAGE ENGLISH   LEARNER PREFERENCE PRIMARY VIDEOS   ANSWERED BY PATIENT    RELATIONSHIP SELF       Abuse Screening:  No flowsheet data found. Fall Risk  Fall Risk Assessment, last 12 mths 11/1/2018   Able to walk? Yes   Fall in past 12 months? Yes   Fall with injury? No   Number of falls in past 12 months 3   Fall Risk Score 3       Health Maintenance reviewed and discussed and ordered per Provider. Health Maintenance Due   Topic Date Due    Hepatitis C Screening  1945    Eye Exam Retinal or Dilated  09/23/1957    Shingrix Vaccine Age 50> (1 of 2) 09/23/1997    Breast Cancer Screen Mammogram  09/23/1997    FOBT Q1Y Age 50-75  09/23/1997    Bone Densitometry (Dexa) Screening  09/23/2012    GLAUCOMA SCREENING Q2Y  11/17/2016    Lipid Screen  03/20/2019    MICROALBUMIN Q1  06/04/2019    Medicare Yearly Exam  06/05/2019    Foot Exam Q1  06/12/2019    A1C test (Diabetic or Prediabetic)  10/23/2019    DTaP/Tdap/Td series (2 - Td) 01/04/2020   . Coordination of Care:  1. Have you been to the ER, urgent care clinic since your last visit? Hospitalized since your last visit? no    2. Have you seen or consulted any other health care providers outside of the [address] [place] [address] since your last visit? Include any pap smears or colon screening. no      Last  Checked na  Last UDS Checked na  Last Pain contract signed: na    Patient presents in office today for routine care.   Patient concerns: pre-op exam

## 2020-07-23 LAB
HCV AB SER IA-ACNC: 0.09 INDEX
HCV AB SERPL QL IA: NEGATIVE
HCV COMMENT,HCGAC: NORMAL

## 2020-09-03 RX ORDER — CARVEDILOL 6.25 MG/1
TABLET ORAL
Qty: 180 TAB | Refills: 1 | Status: SHIPPED | OUTPATIENT
Start: 2020-09-03

## 2020-09-11 ENCOUNTER — TELEPHONE (OUTPATIENT)
Dept: FAMILY MEDICINE CLINIC | Age: 75
End: 2020-09-11

## 2020-09-11 NOTE — TELEPHONE ENCOUNTER
Pt's niece called in and informed me that the pt had taken a fall when she was at another doctors appt earlier this week and her balance has been off since then. The niece wasn't sure what else to do so she was wanting a nurse to please give her a call back. Please advise.

## 2020-09-11 NOTE — TELEPHONE ENCOUNTER
Returned called the niece states that the patient hadn't eaten for a day and a half and she was having eye procedures by the Dr. She does have a history of stroke and heart attack so I told her to go to the ER and get checked out. The niece said she would.

## 2020-11-03 RX ORDER — INSULIN GLARGINE 100 [IU]/ML
INJECTION, SOLUTION SUBCUTANEOUS
Qty: 15 ML | Refills: 0 | Status: SHIPPED | OUTPATIENT
Start: 2020-11-03 | End: 2021-01-05

## 2020-12-12 ENCOUNTER — APPOINTMENT (OUTPATIENT)
Dept: GENERAL RADIOLOGY | Age: 75
End: 2020-12-12
Attending: EMERGENCY MEDICINE
Payer: MEDICARE

## 2020-12-12 ENCOUNTER — HOSPITAL ENCOUNTER (EMERGENCY)
Dept: GENERAL RADIOLOGY | Age: 75
Discharge: HOME | End: 2020-12-12
Attending: EMERGENCY MEDICINE
Payer: MEDICARE

## 2020-12-12 ENCOUNTER — HOSPITAL ENCOUNTER (EMERGENCY)
Age: 75
Discharge: HOME | End: 2020-12-12
Attending: EMERGENCY MEDICINE
Payer: MEDICARE

## 2020-12-12 VITALS
BODY MASS INDEX: 44.53 KG/M2 | TEMPERATURE: 98.8 F | RESPIRATION RATE: 16 BRPM | HEIGHT: 62 IN | OXYGEN SATURATION: 97 % | DIASTOLIC BLOOD PRESSURE: 58 MMHG | SYSTOLIC BLOOD PRESSURE: 132 MMHG | WEIGHT: 242 LBS

## 2020-12-12 DIAGNOSIS — S82.832A CLOSED FRACTURE OF PROXIMAL END OF LEFT FIBULA, UNSPECIFIED FRACTURE MORPHOLOGY, INITIAL ENCOUNTER: Primary | ICD-10-CM
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
## 2020-12-12 PROCEDURE — 99285 EMERGENCY DEPT VISIT HI MDM: CPT

## 2020-12-12 PROCEDURE — 75810000053 HC SPLINT APPLICATION

## 2020-12-12 PROCEDURE — 73610 X-RAY EXAM OF ANKLE: CPT

## 2020-12-12 PROCEDURE — 73560 X-RAY EXAM OF KNEE 1 OR 2: CPT

## 2020-12-12 PROCEDURE — 73590 X-RAY EXAM OF LOWER LEG: CPT

## 2020-12-12 PROCEDURE — 74011250637 HC RX REV CODE- 250/637: Performed by: EMERGENCY MEDICINE

## 2020-12-12 RX ORDER — HYDROCODONE BITARTRATE AND ACETAMINOPHEN 7.5; 325 MG/1; MG/1
1 TABLET ORAL
Status: COMPLETED | OUTPATIENT
Start: 2020-12-12 | End: 2020-12-12

## 2020-12-12 RX ORDER — HYDROCODONE BITARTRATE AND ACETAMINOPHEN 5; 325 MG/1; MG/1
1 TABLET ORAL
Qty: 12 TAB | Refills: 0 | Status: SHIPPED | OUTPATIENT
Start: 2020-12-12 | End: 2020-12-17 | Stop reason: SDUPTHER

## 2020-12-12 RX ADMIN — HYDROCODONE BITARTRATE AND ACETAMINOPHEN 1 TABLET: 7.5; 325 TABLET ORAL at 20:08

## 2020-12-13 NOTE — DISCHARGE INSTRUCTIONS
Patient Education        Preventing Falls: Care Instructions  Your Care Instructions     Getting around your home safely can be a challenge if you have injuries or health problems that make it easy for you to fall. Loose rugs and furniture in walkways are among the dangers for many older people who have problems walking or who have poor eyesight. People who have conditions such as arthritis, osteoporosis, or dementia also have to be careful not to fall. You can make your home safer with a few simple measures. Follow-up care is a key part of your treatment and safety. Be sure to make and go to all appointments, and call your doctor if you are having problems. It's also a good idea to know your test results and keep a list of the medicines you take. How can you care for yourself at home? Taking care of yourself  · You may get dizzy if you do not drink enough water. To prevent dehydration, drink plenty of fluids, enough so that your urine is light yellow or clear like water. Choose water and other caffeine-free clear liquids. If you have kidney, heart, or liver disease and have to limit fluids, talk with your doctor before you increase the amount of fluids you drink. · Exercise regularly to improve your strength, muscle tone, and balance. Walk if you can. Swimming may be a good choice if you cannot walk easily. · Have your vision and hearing checked each year or any time you notice a change. If you have trouble seeing and hearing, you might not be able to avoid objects and could lose your balance. · Know the side effects of the medicines you take. Ask your doctor or pharmacist whether the medicines you take can affect your balance. Sleeping pills or sedatives can affect your balance. · Limit the amount of alcohol you drink. Alcohol can impair your balance and other senses. · Ask your doctor whether calluses or corns on your feet need to be removed.  If you wear loose-fitting shoes because of calluses or corns, you can lose your balance and fall. · Talk to your doctor if you have numbness in your feet. Preventing falls at home  · Remove raised doorway thresholds, throw rugs, and clutter. Repair loose carpet or raised areas in the floor. · Move furniture and electrical cords to keep them out of walking paths. · Use nonskid floor wax, and wipe up spills right away, especially on ceramic tile floors. · If you use a walker or cane, put rubber tips on it. If you use crutches, clean the bottoms of them regularly with an abrasive pad, such as steel wool. · Keep your house well lit, especially [place], and outside walkways. Use night-lights in areas such as hallways and bathrooms. Add extra light switches or use remote switches (such as switches that go on or off when you clap your hands) to make it easier to turn lights on if you have to get up during the night. · Install sturdy handrails on stairways. · Move items in your cabinets so that the things you use a lot are on the lower shelves (about waist level). · Keep a cordless phone and a flashlight with new batteries by your bed. If possible, put a phone in each of the main rooms of your house, or carry a cell phone in case you fall and cannot reach a phone. Or, you can wear a device around your neck or wrist. You push a button that sends a signal for help. · Wear low-heeled shoes that fit well and give your feet good support. Use footwear with nonskid soles. Check the heels and soles of your shoes for wear. Repair or replace worn heels or soles. · Do not wear socks without shoes on wood floors. · Walk on the grass when the sidewalks are slippery. If you live in an area that gets snow and ice in the winter, sprinkle salt on slippery steps and sidewalks. Preventing falls in the bath  · Install grab bars and nonskid mats inside and outside your shower or tub and near the toilet and sinks. · Use shower chairs and bath benches.   · Use a hand-held shower head that will allow you to sit while showering. · Get into a tub or shower by putting the weaker leg in first. Get out of a tub or shower with your strong side first.  · Repair loose toilet seats and consider installing a raised toilet seat to make getting on and off the toilet easier. · Keep your bathroom door unlocked while you are in the shower. Where can you learn more? Go to http://www.del [employer].com/  Enter G117 in the search box to learn more about \"Preventing Falls: Care Instructions. \"  Current as of: April 15, 2020               Content Version: 12.6  © 3437-8691 [employer]. Care instructions adapted under license by [employer] (which disclaims liability or warranty for this information). If you have questions about a medical condition or this instruction, always ask your healthcare professional. [address] any warranty or liability for your use of this information.

## 2020-12-13 NOTE — ED TRIAGE NOTES
Patient has a splint on her L foot, today was in the kitchen, states she turned and her L calf gave out, states she heard a \"pop\", no LOC

## 2020-12-13 NOTE — ED PROVIDER NOTES
EMERGENCY DEPARTMENT HISTORY AND PHYSICAL EXAM    7:42 PM    Date: 12/12/2020  Patient Name: Isela James    History of Presenting Illness     Chief Complaint   Patient presents with    Leg Pain    Fall       History Provided By: Patient  Location/Duration/Severity/Modifying factors   Patient 71-year-old female with history of coronary artery disease, prior stroke presented today with left leg pain. Patient reports she was ambulating to her kitchen earlier this evening as she was walking to the kitchen she reports she heard a \"crack\" and then fell to the ground. Reports she landed on her left side. She believes that the crack she heard was coming from the lateral aspect of her left leg. She did not hit her head or lose consciousness. Complains of pain diffusely in the left leg, poorly localized but seems to be both in the knee and in the ankle. Reports she has chronic swelling of both legs and chronic neuropathy of feet bilaterally. She believes this to be due to diabetes. She denies any fevers or chills. Not taking any blood thinners that she is aware of. No loss of consciousness, no no neck or back pain, no chest pain or abdominal pain. Pain rated as a 10/10. PCP: Tyrese Layton MD    Current Outpatient Medications   Medication Sig Dispense Refill    HYDROcodone-acetaminophen (Norco) 5-325 mg per tablet Take 1 Tab by mouth every six (6) hours as needed for Pain for up to 3 days. Max Daily Amount: 4 Tabs.  12 Tab 0    insulin aspart U-100 (NovoLOG Flexpen U-100 Insulin) 100 unit/mL (3 mL) inpn INJECT 3 UNITS SUBCUTANEOUSLY BEFORE BREAKFAST, LUNCH AND DINNER (DISCARD AND BEGIN A NEW PEN AFTER 28 DAYS) 15 mL 0    Lantus Solostar U-100 Insulin 100 unit/mL (3 mL) inpn INJECT 12 UNITS SUBCUTANEOUSLY DAILY  15 mL 0    carvediloL (COREG) 6.25 mg tablet TAKE 1 TABLET TWICE DAILY  WITH  MEALS 180 Tab 1    escitalopram oxalate (LEXAPRO) 10 mg tablet TAKE 1 TABLET EVERY DAY 90 Tab 3    clopidogreL (PLAVIX) 75 mg tab TAKE 1 TABLET EVERY DAY 90 Tab 3    gabapentin (NEURONTIN) 300 mg capsule Take 1 Cap by mouth three (3) times daily. 270 Cap 3    ALPRAZolam (XANAX) 0.5 mg tablet Take 1 Tab by mouth three (3) times daily as needed for Anxiety. Max Daily Amount: 1.5 mg. 20 Tab 0    hydroCHLOROthiazide (HYDRODIURIL) 25 mg tablet Take 1 Tab by mouth daily. 90 Tab 3    acetaminophen (TYLENOL EXTRA STRENGTH) 500 mg tablet Take 2 Tabs by mouth every six (6) hours as needed for Pain. 50 Tab 0    atorvastatin (LIPITOR) 80 mg tablet Take 1 Tab by mouth nightly. 90 Tab 3    Lancets misc Use tid 1 Package 11    glucose blood VI test strips (ASCENSIA AUTODISC VI, ONE TOUCH ULTRA TEST VI) strip by Does Not Apply route See Admin Instructions. Use tid 200 Strip 6    Blood-Glucose Meter monitoring kit Test blood glucose 4 times a day. 1 Kit 0    Insulin Needles, Disposable, (LITE TOUCH INSULIN PEN NEEDLES) 31 gauge x 1/4\" ndle 1 Each by Does Not Apply route five (5) times daily. 200 Pen Needle 6    aspirin (ASPIRIN) 325 mg tablet Take 1 Tab by mouth daily. (Patient taking differently: Take 81 mg by mouth daily.) 30 Tab 0    glucose 4 gram chewable tablet Take 4 tablets by mouth as needed (hypoglcemia). 100 tablet 1    nitroglycerin (NITROSTAT) 0.4 mg SL tablet 1 Tab by SubLINGual route every five (5) minutes as needed for Chest Pain.  1 Bottle 2       Past History     Past Medical History:  Past Medical History:   Diagnosis Date    Arthritis     Bilateral cataracts     sees Dr. Shanita Moya    COPD     Coronary artery disease     Cx - 2.75 x 33mm XIENCE 10/13     Diabetes     Dyslipidemia     Hypertension     MI (myocardial infarction) ([address]     Mobitz type 2 second degree atrioventricular block 10/23/2018    Noncompliance     Obesity     Pacemaker 10/2018    dual chamber Medtronic pacemaker for second degere AVB    TIA (transient ischemic attack) 3/20/2018       Past Surgical History:  Past Surgical History: Procedure Laterality Date    HX CORONARY STENT PLACEMENT  2013    TN INS NEW/RPLCMT PRM PM W/TRANSV ELTRD ATRIAL&VENT N/A 10/26/2018    INSERT PPM DUAL performed by Judie Miguel MD at [facility] CATH LAB       Family History:  Family History   Problem Relation Age of Onset    Arthritis-osteo Mother     Heart Disease Mother     Alcohol abuse Neg Hx     Asthma Neg Hx     Bleeding Prob Neg Hx     Cancer Neg Hx     Diabetes Neg Hx     Elevated Lipids Neg Hx     Headache Neg Hx     Hypertension Neg Hx     Lung Disease Neg Hx     Migraines Neg Hx     Psychiatric Disorder Neg Hx     Stroke Neg Hx     Mental Retardation Neg Hx        Social History:  Social History     Tobacco Use    Smoking status: Current Some Day Smoker     Packs/day: 1.00     Years: 54.00     Pack years: 54.00     Start date: 2/10/2014    Smokeless tobacco: Never Used    Tobacco comment: pt states she uses e-cigs to stop smoking   Substance Use Topics    Alcohol use: No    Drug use: No       Allergies:  No Known Allergies    I reviewed and confirmed the above information with patient and updated as necessary. Review of Systems     Review of Systems   Constitutional: Negative for chills and fever. HENT: Negative for congestion, rhinorrhea, sinus pressure and sneezing. Eyes: Negative for visual disturbance. Respiratory: Negative for cough and shortness of breath. Cardiovascular: Negative for chest pain. Gastrointestinal: Negative for abdominal pain, diarrhea, nausea and vomiting. Genitourinary: Negative for dysuria, frequency and urgency. Musculoskeletal: Positive for joint swelling (L ankle). Negative for back pain, neck pain and neck stiffness. Skin: Negative for rash. Neurological: Negative for syncope, numbness and headaches.        Physical Exam     Visit Vitals  BP (!) 132/58   Temp 98.8 °F (37.1 °C)   Resp 16   Ht 5' 2\" (1.575 m)   Wt 109.8 kg (242 lb)   SpO2 97%   BMI 44.26 kg/m²       Physical Exam  Vitals signs and nursing note reviewed. Constitutional:       General: She is not in acute distress. Appearance: Normal appearance. She is not toxic-appearing. HENT:      Head: Normocephalic and atraumatic. Right Ear: External ear normal.      Left Ear: External ear normal.      Nose: Nose normal. No congestion or rhinorrhea. Mouth/Throat:      Mouth: Mucous membranes are moist.      Pharynx: Oropharynx is clear. No oropharyngeal exudate or posterior oropharyngeal erythema. Eyes:      Pupils: Pupils are equal, round, and reactive to light. Neck:      Musculoskeletal: Normal range of motion and neck supple. No muscular tenderness. Comments: No midline tenderness to the cervical, thoracic or lumbar spine. Cardiovascular:      Rate and Rhythm: Normal rate and regular rhythm. Pulses: Normal pulses. Heart sounds: Normal heart sounds. No murmur. Pulmonary:      Effort: Pulmonary effort is normal.      Breath sounds: Normal breath sounds. No wheezing, rhonchi or rales. Abdominal:      General: Abdomen is flat. Tenderness: There is no abdominal tenderness. There is no guarding or rebound. Musculoskeletal:         General: Swelling present. Right lower leg: No edema. Left lower leg: No edema. Comments: Mild soft tissue swelling to the left ankle and left knee area. Mild tenderness elicited along the lateral malleolus as well as the medial malleolus as well as the patella. The Achilles tendon feels to be grossly intact. She is able to plantar and dorsiflex. Dorsiflexion cause the patient pain. When palpating the ankle, patient reports that she has chronic pain in the feet and ankles bilaterally and states that that is really at baseline. She does have tenderness on palpation at the lateral aspect, just distal to the head of the fibula. Maintained strength in plantar dorsiflexion. Skin:     General: Skin is warm and dry.       Capillary Refill: Capillary refill takes less than 2 seconds. Findings: No rash. Neurological:      General: No focal deficit present. Mental Status: She is alert. Diagnostic Study Results     Labs -  No results found for this or any previous visit (from the past 24 hour(s)). Radiologic Studies -   XR KNEE LT MAX 2 VWS   Final Result   IMPRESSION: No acute fractures or subluxation of left knee. Moderate joint   effusion. 9 mm calcified ovoid density adjacent to the tibial spine suspicious for   osteochondral loose body. Advise MRI for further evaluation on nonemergent   basis. XR TIB/FIB LT    (Results Pending)   XR ANKLE LT MIN 3 V    (Results Pending)           Medical Decision Making   I am the first provider for this patient. I reviewed the vital signs, available nursing notes, past medical history, past surgical history, family history and social history. Vital Signs-Reviewed the patient's vital signs. EKG: n/a    Records Reviewed: Nursing Notes and Old Medical Records (Time of Review: 7:42 PM)    ED Course: Progress Notes, Reevaluation, and Consults:  ED Course as of Dec 13 0009   Sat Dec 12, 2020   2119 X-ray: Tib-fib left: Fracture of the proximal fibular shaft. Ankle mortise is maintained. Tibia looks normal.  X-ray: Ankle left -no apparent fracture or dislocation. [REJI]      ED Course User Index  [REJI] Keisha Bernal DO         Provider Notes (Medical Decision Making):   MDM  Number of Diagnoses or Management Options  Closed fracture of proximal end of left fibula, unspecified fracture morphology, initial encounter:   Fall, initial encounter:   Diagnosis management comments: 81-year-old female presents with left leg injury after she fell. Reports that she heard a \"crack\" while ambulating her home. Denies any other areas of pain or injury. On exam she has tenderness to diffusely in the left lower extremity. No pain elicited at the left hip.   Denies any neck or back pain.    DDX: To includebut not limited to the following: fracture, contusion, dislocation, tendon / ligament injury, sprain, strain. By my x-ray read patient has a proximal fibula but not fibular head fracture. She has no significant displacement. I do not see any other signs of fracture on the ankle film. On reassessment for secondary fracture injury patient does not have any other areas of pain. The knee x-ray was read by the radiologist, likely with a chronic joint body. Patient advised of this. I will place the patient in a long-leg knee immobilizer. She reports that she typically can use a walker at home. I will give her crutches to try as well. I will give her orthopedic surgery follow-up. Advised that she should wear the knee immobilizer. No signs of common fibular nerve injury. Advised return if symptoms are worsening or changing in any way in the meantime or if she experiences any worsening in her condition. At this time, patient is stable and appropriate for discharge home.  Patient demonstrates understanding of current diagnoses and is in agreement with the treatment plan. Maria Elena Cambric are advised that while the likelihood of serious underlying condition is low at this point given the evaluation performed today, we cannot fully rule it out. Maria Elena Cambric are advised to immediately return with any new symptoms or worsening of current condition.  All questions have been answered. Geraldine Joyner is given educational material regarding their diagnoses, including danger symptoms and when to return to the ED. This note was dictated utilizing Dragon voice recognition software. Unfortunately this leads to occasional typographical errors. I apologize in advance if the situation occurs. If questions occur please do not hesitate to contact me directly. Celi Reyes, DO          Procedures    Critical Care Time: N/A    Diagnosis     Clinical Impression:   1.  Closed fracture of proximal end of left fibula, unspecified fracture morphology, initial encounter    2. Fall, initial encounter        Disposition: Discharge    Follow-up Information     Follow up With Specialties Details Why [address] [place] [address] Drive, [address], MD Orthopedic Surgery, Orthopedic Surgery, Orthopedic Surgery In 1 day Call on Monday for a follow up appointment. [place] [address]  [address] [facility] EMERGENCY DEPT Emergency Medicine  As needed, If symptoms worsen [address] [place] [address] [facility]  In 1 day Call on Monday for a follow up appointment. [address] [facility]  [address]  [phone]           Discharge Medication List as of 12/12/2020  9:24 PM      START taking these medications    Details   HYDROcodone-acetaminophen (Norco) 5-325 mg per tablet Take 1 Tab by mouth every six (6) hours as needed for Pain for up to 3 days. Max Daily Amount: 4 Tabs., Normal, Disp-12 Tab,R-0         CONTINUE these medications which have NOT CHANGED    Details   insulin aspart U-100 (NovoLOG Flexpen U-100 Insulin) 100 unit/mL (3 mL) inpn INJECT 3 UNITS SUBCUTANEOUSLY BEFORE BREAKFAST, LUNCH AND DINNER (DISCARD AND BEGIN A NEW PEN AFTER 28 DAYS), Normal, Disp-15 mL,R-0      Lantus Solostar U-100 Insulin 100 unit/mL (3 mL) inpn INJECT 12 UNITS SUBCUTANEOUSLY DAILY , Normal, Disp-15 mL,R-0      carvediloL (COREG) 6.25 mg tablet TAKE 1 TABLET TWICE DAILY  WITH  MEALS, Normal, Disp-180 Tab,R-1      escitalopram oxalate (LEXAPRO) 10 mg tablet TAKE 1 TABLET EVERY DAY, Normal, Disp-90 Tab,R-3      clopidogreL (PLAVIX) 75 mg tab TAKE 1 TABLET EVERY DAY, Normal, Disp-90 Tab,R-3      gabapentin (NEURONTIN) 300 mg capsule Take 1 Cap by mouth three (3) times daily. , Normal, Disp-270 Cap, R-3      ALPRAZolam (XANAX) 0.5 mg tablet Take 1 Tab by mouth three (3) times daily as needed for Anxiety.  Max Daily Amount: 1.5 mg., Print, Disp-20 Tab, R-0 hydroCHLOROthiazide (HYDRODIURIL) 25 mg tablet Take 1 Tab by mouth daily. , Normal, Disp-90 Tab, R-3      acetaminophen (TYLENOL EXTRA STRENGTH) 500 mg tablet Take 2 Tabs by mouth every six (6) hours as needed for Pain., Print, Disp-50 Tab, R-0      atorvastatin (LIPITOR) 80 mg tablet Take 1 Tab by mouth nightly., Normal, Disp-90 Tab, R-3      Lancets misc Use tid, Normal, Disp-1 Package, R-11      glucose blood VI test strips (ASCENSIA AUTODISC VI, ONE TOUCH ULTRA TEST VI) strip by Does Not Apply route See Admin Instructions. Use tid, Normal, Disp-200 Strip, R-6      Blood-Glucose Meter monitoring kit Test blood glucose 4 times a day., Normal, Disp-1 Kit, R-0      Insulin Needles, Disposable, (LITE TOUCH INSULIN PEN NEEDLES) 31 gauge x 1/4\" ndle 1 Each by Does Not Apply route five (5) times daily. , Normal, Disp-200 Pen Needle, R-6      aspirin (ASPIRIN) 325 mg tablet Take 1 Tab by mouth daily. , Normal, Disp-30 Tab, R-0      glucose 4 gram chewable tablet Take 4 tablets by mouth as needed (hypoglcemia). , Print, Disp-100 tablet, R-1      nitroglycerin (NITROSTAT) 0.4 mg SL tablet 1 Tab by SubLINGual route every five (5) minutes as needed for Chest Pain., Normal, Disp-1 Bottle, R-2             Nahum Estrella DO   Emergency Medicine   December 13, 2020, 7:42 PM     This note is dictated utilizing Dragon voice recognition software. Unfortunately this leads to occasional typographical errors using the voice recognition. I apologize in advance if the situation occurs. If questions occur please do not hesitate to contact me directly.     Nahum Estrella, DO

## 2020-12-14 ENCOUNTER — TELEPHONE (OUTPATIENT)
Dept: FAMILY MEDICINE CLINIC | Age: 75
End: 2020-12-14

## 2020-12-14 NOTE — TELEPHONE ENCOUNTER
Pt' niece came into the office and stated the hospital placed a \"compression brace\" on left leg and she received instructions to put warm/cold compress on her leg. She is wanting to know if they were supposed to take the brace off. Please advice. Pt. Has an upcoming vv on 12/17/2020.

## 2020-12-15 NOTE — TELEPHONE ENCOUNTER
Patient notified of instructions from PCP in regards to the compression brace.  No other concerns closing encounter

## 2020-12-17 ENCOUNTER — VIRTUAL VISIT (OUTPATIENT)
Dept: FAMILY MEDICINE CLINIC | Age: 75
End: 2020-12-17
Payer: MEDICARE

## 2020-12-17 DIAGNOSIS — F41.9 ANXIETY: ICD-10-CM

## 2020-12-17 DIAGNOSIS — E11.40 TYPE 2 DIABETES MELLITUS WITH DIABETIC NEUROPATHY, WITH LONG-TERM CURRENT USE OF INSULIN (HCC): ICD-10-CM

## 2020-12-17 DIAGNOSIS — I11.9 BENIGN HYPERTENSIVE HEART DISEASE WITHOUT HEART FAILURE: ICD-10-CM

## 2020-12-17 DIAGNOSIS — E66.01 CLASS 2 SEVERE OBESITY DUE TO EXCESS CALORIES WITH SERIOUS COMORBIDITY AND BODY MASS INDEX (BMI) OF 39.0 TO 39.9 IN ADULT (HCC): ICD-10-CM

## 2020-12-17 DIAGNOSIS — S82.832A CLOSED FRACTURE OF PROXIMAL END OF LEFT FIBULA, UNSPECIFIED FRACTURE MORPHOLOGY, INITIAL ENCOUNTER: Primary | ICD-10-CM

## 2020-12-17 DIAGNOSIS — Z79.4 TYPE 2 DIABETES MELLITUS WITH DIABETIC NEUROPATHY, WITH LONG-TERM CURRENT USE OF INSULIN (HCC): ICD-10-CM

## 2020-12-17 PROCEDURE — 1090F PRES/ABSN URINE INCON ASSESS: CPT | Performed by: FAMILY MEDICINE

## 2020-12-17 PROCEDURE — G8428 CUR MEDS NOT DOCUMENT: HCPCS | Performed by: FAMILY MEDICINE

## 2020-12-17 PROCEDURE — 2022F DILAT RTA XM EVC RTNOPTHY: CPT | Performed by: FAMILY MEDICINE

## 2020-12-17 PROCEDURE — G8400 PT W/DXA NO RESULTS DOC: HCPCS | Performed by: FAMILY MEDICINE

## 2020-12-17 PROCEDURE — 3052F HG A1C>EQUAL 8.0%<EQUAL 9.0%: CPT | Performed by: FAMILY MEDICINE

## 2020-12-17 PROCEDURE — 1101F PT FALLS ASSESS-DOCD LE1/YR: CPT | Performed by: FAMILY MEDICINE

## 2020-12-17 PROCEDURE — 99214 OFFICE O/P EST MOD 30 MIN: CPT | Performed by: FAMILY MEDICINE

## 2020-12-17 PROCEDURE — G8510 SCR DEP NEG, NO PLAN REQD: HCPCS | Performed by: FAMILY MEDICINE

## 2020-12-17 PROCEDURE — 3017F COLORECTAL CA SCREEN DOC REV: CPT | Performed by: FAMILY MEDICINE

## 2020-12-17 RX ORDER — HYDROCODONE BITARTRATE AND ACETAMINOPHEN 5; 325 MG/1; MG/1
1 TABLET ORAL
Qty: 12 TAB | Refills: 0 | Status: SHIPPED | OUTPATIENT
Start: 2020-12-17 | End: 2020-12-20

## 2020-12-17 NOTE — PROGRESS NOTES
Frantz Woodson is a 70 y.o.  female and presents with    Chief Complaint   Patient presents with   [facility]     assistance needed in the home        Frantz Woodson, who was evaluated through a synchronous (real-time) audio-video encounter, and/or her healthcare decision maker, is aware that it is a billable service, with coverage as determined by her insurance carrier. She provided verbal consent to proceed: Yes, and patient identification was verified. It was conducted pursuant to the emergency declaration under the [facility], [address] [place] [address] and the [employer] Act. A caregiver was present when appropriate. Ability to conduct physical exam was limited. I was in the office. The patient was at home. Subjective:  She is following up after ER visit for left leg fracture. Patient reports she was ambulating to her kitchen earlier this evening as she was walking to the kitchen she reports she heard a \"crack\" and then fell to the ground. Reports she landed on her left side. She believes that the crack she heard was coming from the lateral aspect of her left leg. She did not hit her head or lose consciousness. Complains of pain diffusely in the left leg, poorly localized but seems to be both in the knee and in the ankle. Reports she has chronic swelling of both legs and chronic neuropathy of feet bilaterally. She believes this to be due to diabetes. She denies any fevers or chills. Not taking any blood thinners that she is aware of. No loss of consciousness, no no neck or back pain, no chest pain or abdominal pain. Pain rated as a 10/10. Cardiovascular Review:  The patient has diabetes, hypertension, hyperlipidemia and history of prior stroke.   Diet and Lifestyle: not attempting to follow a low fat, low cholesterol diet, not attempting to follow a low sodium diet, does not rigorously follow a diabetic diet, sedentary, nonsmoker  Home BP Monitoring: is not measured at home. Pertinent ROS: taking medications as instructed, no medication side effects noted, no TIA's, no chest pain on exertion, no dyspnea on exertion, no swelling of ankles. Diabetes Mellitus:  She has diabetes mellitus, and  hypertension, hyperlipidemia, history of prior stroke and obesity. Diabetic ROS - medication compliance: compliant all of the time, diabetic diet compliance: compliant most of the time, home glucose monitoring: is performed sporadically. Lab review: labs reviewed, I note that glycosylated hemoglobin abnormal high. ROS   Constitutional: Negative for chills and fever. HENT: Negative for congestion, rhinorrhea, sinus pressure and sneezing. Eyes: Negative for visual disturbance. Respiratory: Negative for cough and shortness of breath. Cardiovascular: Negative for chest pain. Gastrointestinal: Negative for abdominal pain, diarrhea, nausea and vomiting. Genitourinary: Negative for dysuria, frequency and urgency. Musculoskeletal: Positive for joint swelling (L ankle). Negative for back pain, neck pain and neck stiffness. Skin: Negative for rash. Neurological: Negative for syncope, numbness and headaches. All other systems reviewed and are negative. Objective: There were no vitals filed for this visit. alert, well appearing, and in no distress, oriented to person, place, and time and obese  Mental status - anxious  Chest - normal work of breathing  Neurological - cranial nerves II through XII intact  Musculoskeletal - abnormal exam of left leg which is in a straight leg splint with lower extremity edema  LABS     TESTS      Assessment/Plan:    1.  Closed fracture of proximal end of left fibula, unspecified fracture morphology, initial encounter  Pain management; assistance in the home and wheelchair while healing  - AMB SUPPLY ORDER  - HYDROcodone-acetaminophen (Norco) 5-325 mg per tablet; Take 1 Tab by mouth every six (6) hours as needed for Pain for up to 3 days. Max Daily Amount: 4 Tabs. Dispense: 12 Tab; Refill: 0  - REFERRAL TO HOME HEALTH    2. Type 2 diabetes mellitus with diabetic neuropathy, with long-term current use of insulin (HCC)  Goal hgb a1c <7; needs improvement; encourage compliance with medication and low carb diet    3. Hypertension  Goal <130/80    4. Anxiety  Exacerbated by recent injury    5. Class 2 severe obesity due to excess calories with serious comorbidity and body mass index (BMI) of 39.0 to 39.9 in adult [facility])  I have reviewed/discussed the above normal BMI with the patient. I have recommended the following interventions: dietary management education, guidance, and counseling and monitor weight . .          Lab review: labs reviewed, I note that glycosylated hemoglobin abnormal but improved      I have discussed the diagnosis with the patient and the intended plan as seen in the above orders. I have discussed medication side effects and warnings with the patient as well. I have reviewed the plan of care with the patient, accepted their input and they are in agreement with the treatment goals.

## 2020-12-17 NOTE — PROGRESS NOTES
Bhavya Elizalde presents today for   Chief Complaint   Patient presents with   [facility]     assistance needed in the home        Is someone accompanying this pt? Yes, the niece    Is the patient using any DME equipment during [address]? no    Depression Screening:  3 most recent PHQ Screens 6/4/2018   PHQ Not Done -   Little interest or pleasure in doing things Not at all   Feeling down, depressed, irritable, or hopeless Not at all   Total Score PHQ 2 0       Learning Assessment:  Learning Assessment 2/27/2014   PRIMARY LEARNER Patient   PRIMARY LANGUAGE ENGLISH   LEARNER PREFERENCE PRIMARY VIDEOS   ANSWERED BY PATIENT    RELATIONSHIP SELF       Abuse Screening:  No flowsheet data found. Fall Risk  Fall Risk Assessment, last 12 mths 11/1/2018   Able to walk? Yes   Fall in past 12 months? Yes   Fall with injury? No   Number of falls in past 12 months 3   Fall Risk Score 3       Health Maintenance reviewed and discussed and ordered per Provider. Health Maintenance Due   Topic Date Due    Eye Exam Retinal or Dilated  09/23/1955    Shingrix Vaccine Age 50> (1 of 2) 09/23/1997    Colorectal Cancer Screening Combo  09/23/1997    Breast Cancer Screen Mammogram  09/23/1997    Bone Densitometry (Dexa) Screening  09/23/2012    GLAUCOMA SCREENING Q2Y  11/17/2016    MICROALBUMIN Q1  06/04/2019    DTaP/Tdap/Td series (2 - Td) 01/04/2020    Flu Vaccine (1) 09/01/2020    A1C test (Diabetic or Prediabetic)  10/22/2020   . Coordination of Care:  1. Have you been to the ER, urgent care clinic since your last visit? Hospitalized since your last visit? Yes. ED 12/22/20 for leg fx    2. Have you seen or consulted any other health care providers outside of the [address] [place] [address] since your last visit? Include any pap smears or colon screening.  no      Last  Checked n/a  Last UDS Checked n/a  Last Pain contract signed: n/a

## 2020-12-28 ENCOUNTER — HOME HEALTH ADMISSION (OUTPATIENT)
Dept: HOME HEALTH SERVICES | Facility: HOME HEALTH | Age: 75
End: 2020-12-28
Payer: MEDICARE

## 2020-12-29 ENCOUNTER — HOME CARE VISIT (OUTPATIENT)
Dept: HOME HEALTH SERVICES | Facility: HOME HEALTH | Age: 75
End: 2020-12-29

## 2020-12-29 ENCOUNTER — HOSPITAL ENCOUNTER (EMERGENCY)
Age: 75
Discharge: HOME | End: 2020-12-29
Attending: EMERGENCY MEDICINE
Payer: MEDICARE

## 2020-12-29 VITALS
DIASTOLIC BLOOD PRESSURE: 87 MMHG | RESPIRATION RATE: 16 BRPM | BODY MASS INDEX: 44.72 KG/M2 | HEIGHT: 62 IN | TEMPERATURE: 99.3 F | WEIGHT: 243 LBS | SYSTOLIC BLOOD PRESSURE: 175 MMHG | OXYGEN SATURATION: 97 % | HEART RATE: 92 BPM

## 2020-12-29 DIAGNOSIS — Z23 IMMUNIZATION, TETANUS-DIPHTHERIA: ICD-10-CM
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
## 2020-12-29 DIAGNOSIS — S80.811A CAT SCRATCH OF LOWER LEG, RIGHT, INITIAL ENCOUNTER: ICD-10-CM

## 2020-12-29 DIAGNOSIS — L03.115 CELLULITIS OF RIGHT LOWER EXTREMITY: Primary | ICD-10-CM

## 2020-12-29 PROCEDURE — 90715 TDAP VACCINE 7 YRS/> IM: CPT | Performed by: EMERGENCY MEDICINE

## 2020-12-29 PROCEDURE — 90471 IMMUNIZATION ADMIN: CPT

## 2020-12-29 PROCEDURE — 74011250636 HC RX REV CODE- 250/636: Performed by: EMERGENCY MEDICINE

## 2020-12-29 PROCEDURE — 99281 EMR DPT VST MAYX REQ PHY/QHP: CPT

## 2020-12-29 RX ORDER — CEPHALEXIN 500 MG/1
500 CAPSULE ORAL 4 TIMES DAILY
Qty: 28 CAP | Refills: 0 | Status: SHIPPED | OUTPATIENT
Start: 2020-12-29 | End: 2021-01-05

## 2020-12-29 RX ORDER — SULFAMETHOXAZOLE AND TRIMETHOPRIM 800; 160 MG/1; MG/1
1 TABLET ORAL 2 TIMES DAILY
Qty: 14 TAB | Refills: 0 | Status: SHIPPED | OUTPATIENT
Start: 2020-12-29 | End: 2021-01-05

## 2020-12-29 RX ADMIN — TETANUS TOXOID, REDUCED DIPHTHERIA TOXOID AND ACELLULAR PERTUSSIS VACCINE, ADSORBED 0.5 ML: 5; 2.5; 8; 8; 2.5 SUSPENSION INTRAMUSCULAR at 07:07

## 2020-12-29 NOTE — ED PROVIDER NOTES
[address] [facility]  EMERGENCY DEPARTMENT HISTORY AND PHYSICAL EXAM       Date: 12/29/2020   Patient Name: Odalys Cespedes   YOB: 1945  Medical Record Number: [id number]    HISTORY OF PRESENTING ILLNESS:     Odalys Cespedes is a 70 y.o. female presenting with the noted PMH to the ED c/o her cat scratched her  to her right lower leg. Patient states it happened several days ago. But then she started noticing 3 days ago started getting redness in the area. Now she is concerned gotten infected. She says the soreness is constant. With no increasing or decreasing factors. She denies any fevers or chills. Denies any nausea or vomiting. Denies any chest pain or shortness of breath. Unknown when her last tetanus shot was.     Rest of complete systems reviewed and negative    Primary Care Provider: Ophelia Osorio MD   Specialist:    Past Medical History:   Past Medical History:   Diagnosis Date    Arthritis     Bilateral cataracts     sees Dr. Kellee Carranza    COPD     Coronary artery disease     Cx - 2.75 x 33mm XIENCE 10/13     Diabetes     Dyslipidemia     Hypertension     MI (myocardial infarction) ([address]     Mobitz type 2 second degree atrioventricular block 10/23/2018    Noncompliance     Obesity     Pacemaker 10/2018    dual chamber Medtronic pacemaker for second degere AVB    TIA (transient ischemic attack) 3/20/2018        Past Surgical History:   Past Surgical History:   Procedure Laterality Date    HX CORONARY STENT PLACEMENT  2013    VT INS NEW/RPLCMT PRM PM W/TRANSV ELTRD ATRIAL&VENT N/A 10/26/2018    INSERT PPM DUAL performed by Susu Jiang MD at [facility] CATH LAB        Social History:   Social History     Tobacco Use    Smoking status: Current Some Day Smoker     Packs/day: 1.00     Years: 54.00     Pack years: 54.00     Start date: 2/10/2014    Smokeless tobacco: Never Used    Tobacco comment: pt states she uses e-cigs to stop smoking   Substance Use Topics  Alcohol use: No    Drug use: No        Allergies:   No Known Allergies     REVIEW OF SYSTEMS:  Review of Systems      PHYSICAL EXAM:  Vitals:    12/29/20 0703   BP: (!) 175/87   Pulse: 92   Resp: 16   Temp: 99.3 °F (37.4 °C)   SpO2: 97%   Weight: 110.2 kg (243 lb)   Height: 5' 2\" (1.575 m)       Physical Exam   Vital signs reviewed. Alert oriented x 3 in NAD. HEENT: normocephalic atraumatic. Eyes are PERRLA EOMI. Conjunctiva normal.    External ears and nose normal.    Neck: normal external exam. No midline neck or back TTP. Lungs are clear to ascultation bilaterally. normal effort  Heart is regular rate and rhythm with no murmurs. Abdomen soft and nontender. No rebound rigidity or guarding. Extremities:  2+ pulses and BCR in all 4 extremities. Flexion/Extension intact. Brace over left lower leg. Trace edema bilateral extremities. Neuro:  5 out of 5 strength in all 4 extremities. No facial droop. Skin examination: intact. no rashes. No petechia or purpura. 3 scratch marks noted to her right lower leg above her ankle in lower third of leg. Spares foot and ankle. With surrounding erythema. No step-offs or crepitus. Medications   diph,Pertuss(AC),Tet Vac-PF (BOOSTRIX) suspension 0.5 mL (0.5 mL IntraMUSCular Given 12/29/20 0707)       RESULTS:    Labs -   Labs Reviewed - No data to display    Radiologic Studies -  No results found. MEDICAL DECISION MAKING    No evidence of septic joint. No compartment syndrome. No evidence of abscess or sepsis. No evidence for fracture or dislocation. Patient denies any trauma. Wound care explained. Will update tetanus shot here. Results and precautions explained. Patient states understanding and agrees with plan. Patient has no new complaints, changes, or physical findings. Results were reviewed with the patient. Pt's questions and concerns were addressed.  Care plan was outlined, including follow-up with PCP/specialist and return precautions were discussed. Patient is felt to be stable for discharge at this time. Diagnosis   Clinical Impression:   1. Cellulitis of right lower extremity    2. Cat scratch of lower leg, right, initial encounter    3. Immunization, tetanus-diphtheria           Follow-up Information     Follow up With Specialties Details Why Contact Info    Tommy William MD Family Medicine, Internal Medicine Call today  [address] [address] [phone]      [facility] EMERGENCY DEPT Emergency Medicine Go in 2 days If symptoms worsen, As needed [address]  [phone]          Current Discharge Medication List      START taking these medications    Details   cephALEXin (Keflex) 500 mg capsule Take 1 Cap by mouth four (4) times daily for 7 days. Qty: 28 Cap, Refills: 0      trimethoprim-sulfamethoxazole (Bactrim DS) 160-800 mg per tablet Take 1 Tab by mouth two (2) times a day for 7 days. Qty: 14 Tab, Refills: 0             Discharged in stable and improved condition. This chart was completed using Dragon, a dictation transcription service. Errors may have resulted from using this device.

## 2020-12-30 ENCOUNTER — HOME CARE VISIT (OUTPATIENT)
Dept: SCHEDULING | Facility: HOME HEALTH | Age: 75
End: 2020-12-30
Payer: MEDICARE

## 2020-12-30 ENCOUNTER — TELEPHONE (OUTPATIENT)
Dept: FAMILY MEDICINE CLINIC | Age: 75
End: 2020-12-30

## 2020-12-30 ENCOUNTER — PATIENT OUTREACH (OUTPATIENT)
Dept: CASE MANAGEMENT | Age: 75
End: 2020-12-30

## 2020-12-30 VITALS
SYSTOLIC BLOOD PRESSURE: 120 MMHG | DIASTOLIC BLOOD PRESSURE: 80 MMHG | HEART RATE: 76 BPM | OXYGEN SATURATION: 98 % | TEMPERATURE: 97.7 F | RESPIRATION RATE: 17 BRPM

## 2020-12-30 PROCEDURE — G0151 HHCP-SERV OF PT,EA 15 MIN: HCPCS

## 2020-12-30 PROCEDURE — 400013 HH SOC

## 2020-12-30 PROCEDURE — 3331090002 HH PPS REVENUE DEBIT

## 2020-12-30 PROCEDURE — 3331090001 HH PPS REVENUE CREDIT

## 2020-12-30 NOTE — TELEPHONE ENCOUNTER
Gloria Milan from [facility] called in and was asking about the patient's weight bearing status? She asked for a call back at [phone]. Please advise.

## 2020-12-30 NOTE — PROGRESS NOTES
Complex Case Management      Date/Time:  2020 10:34 AM    Method of communication with patient:phone    [facility] ([facility]) contacted the patient by telephone to perform Ambulatory Care Coordination. Verified name and  (PHI) with patient as identifiers. Provided introduction to self, and explanation of the Ambulatory Care Manager's role. Reviewed most recent clinic visit w/ patient who verbalized understanding. Patient given an opportunity to ask questions. Top Challenges reviewed with the patient   1. Spoke with patient  2. States scratch on her leg is healing, swelling continues in lower extremities. 3. Prelim med review done - med rec to be done at next encounter. NO issues with refills at this time. 4. Patient has [facility] contact information and will be followed per [facility] protocol. The patient agrees to contact the PCP office or the [facility] for questions related to their healthcare. Provided contact information for future reference. Disease Specific:   COPD    Home Health Active: Yes    DME Active: Yes    Barriers to care? lack of knowledge about disease, medication management, transportation    Advance Care Planning:   Does patient have an Advance Directive:  not on file; education provided     Medication(s):   Medication reconciliation was not performed with patient, who verbalizes understanding of administration of home medications. There were no barriers to obtaining medications identified at this time. Referral to Pharm D needed: no     Current Outpatient Medications   Medication Sig    cephALEXin (Keflex) 500 mg capsule Take 1 Cap by mouth four (4) times daily for 7 days.  trimethoprim-sulfamethoxazole (Bactrim DS) 160-800 mg per tablet Take 1 Tab by mouth two (2) times a day for 7 days.     insulin aspart U-100 (NovoLOG Flexpen U-100 Insulin) 100 unit/mL (3 mL) inpn INJECT 3 UNITS SUBCUTANEOUSLY BEFORE BREAKFAST, LUNCH AND DINNER (DISCARD AND BEGIN A NEW PEN AFTER 28 DAYS)    Lantus Solostar U-100 Insulin 100 unit/mL (3 mL) inpn INJECT 12 UNITS SUBCUTANEOUSLY DAILY     carvediloL (COREG) 6.25 mg tablet TAKE 1 TABLET TWICE DAILY  WITH  MEALS    escitalopram oxalate (LEXAPRO) 10 mg tablet TAKE 1 TABLET EVERY DAY    clopidogreL (PLAVIX) 75 mg tab TAKE 1 TABLET EVERY DAY    gabapentin (NEURONTIN) 300 mg capsule Take 1 Cap by mouth three (3) times daily.  ALPRAZolam (XANAX) 0.5 mg tablet Take 1 Tab by mouth three (3) times daily as needed for Anxiety. Max Daily Amount: 1.5 mg.    hydroCHLOROthiazide (HYDRODIURIL) 25 mg tablet Take 1 Tab by mouth daily.  acetaminophen (TYLENOL EXTRA STRENGTH) 500 mg tablet Take 2 Tabs by mouth every six (6) hours as needed for Pain.  atorvastatin (LIPITOR) 80 mg tablet Take 1 Tab by mouth nightly.  Lancets misc Use tid    glucose blood VI test strips (ASCENSIA AUTODISC VI, ONE TOUCH ULTRA TEST VI) strip by Does Not Apply route See Admin Instructions. Use tid    Blood-Glucose Meter monitoring kit Test blood glucose 4 times a day.  Insulin Needles, Disposable, (LITE TOUCH INSULIN PEN NEEDLES) 31 gauge x 1/4\" ndle 1 Each by Does Not Apply route five (5) times daily.  aspirin (ASPIRIN) 325 mg tablet Take 1 Tab by mouth daily. (Patient taking differently: Take 81 mg by mouth daily.)    glucose 4 gram chewable tablet Take 4 tablets by mouth as needed (hypoglcemia).  nitroglycerin (NITROSTAT) 0.4 mg SL tablet 1 Tab by SubLINGual route every five (5) minutes as needed for Chest Pain. No current facility-administered medications for this visit.         [facility] follow up appointment(s):   Future Appointments   Date Time Provider Cara Charlette   12/31/2020  8:15 AM Rosa Wallace MD DMAYANET BS [facility]            Goals Addressed                 This Visit's Progress     Attend follow up appointments on schedule   On track     Prepare patients and caregivers for end of life decisions (ie. need for hospice, pain management, symptom relief, advance directives etc.)   On track     COMPLETED: Prevent complications post hospitalization. -pt will attend PCP appt on 11/01/18  -pt will attend pace maker check appt on 11/06/18  -pt will take all prescribed meds as directed  -pt will contact pharmacy/PCP office for any refills needed  -pt will check glucose daily       Reduce ED Utilization   On track     Take all medications as ordered   On track     COMPLETED: Understands red flags post discharge.         -pt will recognize red flags (CP, SOB, numbness/tingling, bleeding, swelling, fever/chills, pain, palpitations, HA/dizziness) and report to PCP/Cardio office or seek emergency assistance

## 2020-12-31 ENCOUNTER — HOME CARE VISIT (OUTPATIENT)
Dept: HOME HEALTH SERVICES | Facility: HOME HEALTH | Age: 75
End: 2020-12-31
Payer: MEDICARE

## 2020-12-31 ENCOUNTER — VIRTUAL VISIT (OUTPATIENT)
Dept: FAMILY MEDICINE CLINIC | Age: 75
End: 2020-12-31
Payer: MEDICARE

## 2020-12-31 DIAGNOSIS — I11.9 BENIGN HYPERTENSIVE HEART DISEASE WITHOUT HEART FAILURE: ICD-10-CM

## 2020-12-31 DIAGNOSIS — E66.01 CLASS 2 SEVERE OBESITY DUE TO EXCESS CALORIES WITH SERIOUS COMORBIDITY AND BODY MASS INDEX (BMI) OF 39.0 TO 39.9 IN ADULT (HCC): ICD-10-CM

## 2020-12-31 DIAGNOSIS — E11.40 TYPE 2 DIABETES MELLITUS WITH DIABETIC NEUROPATHY, WITH LONG-TERM CURRENT USE OF INSULIN (HCC): ICD-10-CM

## 2020-12-31 DIAGNOSIS — L03.115 CELLULITIS OF RIGHT LOWER LEG: ICD-10-CM

## 2020-12-31 DIAGNOSIS — S82.832A CLOSED FRACTURE OF PROXIMAL END OF LEFT FIBULA, UNSPECIFIED FRACTURE MORPHOLOGY, INITIAL ENCOUNTER: Primary | ICD-10-CM

## 2020-12-31 DIAGNOSIS — F41.9 ANXIETY: ICD-10-CM

## 2020-12-31 DIAGNOSIS — Z79.4 TYPE 2 DIABETES MELLITUS WITH DIABETIC NEUROPATHY, WITH LONG-TERM CURRENT USE OF INSULIN (HCC): ICD-10-CM

## 2020-12-31 DIAGNOSIS — S82.832A CLOSED FRACTURE OF PROXIMAL END OF LEFT FIBULA, UNSPECIFIED FRACTURE MORPHOLOGY, INITIAL ENCOUNTER: ICD-10-CM

## 2020-12-31 PROCEDURE — 3017F COLORECTAL CA SCREEN DOC REV: CPT | Performed by: FAMILY MEDICINE

## 2020-12-31 PROCEDURE — G8432 DEP SCR NOT DOC, RNG: HCPCS | Performed by: FAMILY MEDICINE

## 2020-12-31 PROCEDURE — G8400 PT W/DXA NO RESULTS DOC: HCPCS | Performed by: FAMILY MEDICINE

## 2020-12-31 PROCEDURE — 1101F PT FALLS ASSESS-DOCD LE1/YR: CPT | Performed by: FAMILY MEDICINE

## 2020-12-31 PROCEDURE — 3331090001 HH PPS REVENUE CREDIT

## 2020-12-31 PROCEDURE — G8428 CUR MEDS NOT DOCUMENT: HCPCS | Performed by: FAMILY MEDICINE

## 2020-12-31 PROCEDURE — 3331090002 HH PPS REVENUE DEBIT

## 2020-12-31 PROCEDURE — 2022F DILAT RTA XM EVC RTNOPTHY: CPT | Performed by: FAMILY MEDICINE

## 2020-12-31 PROCEDURE — 1090F PRES/ABSN URINE INCON ASSESS: CPT | Performed by: FAMILY MEDICINE

## 2020-12-31 PROCEDURE — 3052F HG A1C>EQUAL 8.0%<EQUAL 9.0%: CPT | Performed by: FAMILY MEDICINE

## 2020-12-31 PROCEDURE — 99214 OFFICE O/P EST MOD 30 MIN: CPT | Performed by: FAMILY MEDICINE

## 2020-12-31 RX ORDER — HYDROCODONE BITARTRATE AND ACETAMINOPHEN 5; 325 MG/1; MG/1
1 TABLET ORAL
Qty: 12 TAB | Refills: 0 | Status: SHIPPED | OUTPATIENT
Start: 2020-12-31 | End: 2021-01-07

## 2020-12-31 NOTE — PROGRESS NOTES
Nydia Guajardo is a 70 y.o.  female and presents with    Chief Complaint   Patient presents with    Leg Pain     bilateral leg concerns   [facility] Follow Up    Medication Refill     pain med       Nydia Guajardo, who was evaluated through a synchronous (real-time) audio-video encounter, and/or her healthcare decision maker, is aware that it is a billable service, with coverage as determined by her insurance carrier. She provided verbal consent to proceed: Yes, and patient identification was verified. It was conducted pursuant to the emergency declaration under the [facility], [address] [place] [address] authority and the [employer] General Act. A caregiver was present when appropriate. Ability to conduct physical exam was limited. I was in the office. The patient was at home. Subjective:  She was evaluated in the ER for cat scratch on her right lower leg; she has started bactrim. She had swelling into her feet but is now improving. She is not using hot compress. she has left knee fracture and has splint and has not been bearing weight. She takes intermittent aspirin. She takes plavix. She has home health for physical therapy. Cardiovascular Review:  The patient has diabetes, hypertension, hyperlipidemia and history of prior stroke. Diet and Lifestyle: not attempting to follow a low fat, low cholesterol diet, not attempting to follow a low sodium diet, does not rigorously follow a diabetic diet, sedentary, nonsmoker  Home BP Monitoring: is not measured at home. Pertinent ROS: taking medications as instructed, no medication side effects noted, no TIA's, no chest pain on exertion, no dyspnea on exertion, no swelling of ankles. Diabetes Mellitus:  She has diabetes mellitus, and  hypertension, hyperlipidemia, history of prior stroke and obesity.   Diabetic ROS - medication compliance: compliant all of the time, diabetic diet compliance: compliant most of the time, home glucose monitoring: is performed sporadically. Lab review: labs reviewed, I note that glycosylated hemoglobin abnormal high. ROS   Constitutional: Negative for chills and fever. HENT: Negative for congestion, rhinorrhea, sinus pressure and sneezing.    Eyes: Negative for visual disturbance. Respiratory: Negative for cough and shortness of breath.    Cardiovascular: Negative for chest pain. Gastrointestinal: Negative for abdominal pain, diarrhea, nausea and vomiting. Genitourinary: Negative for dysuria, frequency and urgency. Musculoskeletal: Positive for joint swelling (L ankle). Negative for back pain, neck pain and neck stiffness. Skin: Negative for rash. Neurological: Negative for syncope, numbness and headaches.     All other systems reviewed and are negative. Objective: There were no vitals filed for this visit. alert, well appearing, and in no distress, oriented to person, place, and time and morbidly obese  Mental status - normal mood, behavior, speech, dress, motor activity, and thought processes  Chest - normal work of breathing  Neurological - cranial nerves II through XII intact  Musculoskeletal - abnormal exam of left lower leg with pain with motion  Extremities - right lower leg edema and erythema    LABS     TESTS      Assessment/Plan:    1. Closed fracture of proximal end of left fibula, unspecified fracture morphology, initial encounter  Refer to ortho; refer for imaging; pain management  - DEXA BONE DENSITY STUDY AXIAL; Future  - REFERRAL TO ORTHOPEDIC SURGERY  - HYDROcodone-acetaminophen (NORCO) 5-325 mg per tablet; Take 1 Tab by mouth every eight (8) hours as needed for Pain for up to 7 days. Max Daily Amount: 3 Tabs. Dispense: 12 Tab; Refill: 0    2. Cellulitis of right lower leg  Continue abx and elevation and start warm compress  - HYDROcodone-acetaminophen (NORCO) 5-325 mg per tablet;  Take 1 Tab by mouth every eight (8) hours as needed for Pain for up to 7 days. Max Daily Amount: 3 Tabs. Dispense: 12 Tab; Refill: 0    3. Type 2 diabetes mellitus with diabetic neuropathy, with long-term current use of insulin (HCC)  Encourage low carb diet    4. Hypertension  Goal <130/80    5. Anxiety  Mood improved    6. Class 2 severe obesity due to excess calories with serious comorbidity and body mass index (BMI) of 39.0 to 39.9 in [facility])  Encourage healthy diet    Lab review: orders written for new lab studies as appropriate; see orders      I have discussed the diagnosis with the patient and the intended plan as seen in the above orders. I have discussed medication side effects and warnings with the patient as well. I have reviewed the plan of care with the patient, accepted their input and they are in agreement with the treatment goals.

## 2020-12-31 NOTE — PROGRESS NOTES
Suyapaismael Zuñiga presents today for   Chief Complaint   Patient presents with    Leg Pain     bilateral leg concerns   [facility] Follow Up    Medication Refill     pain med       Is someone accompanying this pt? Yes, niece    Is the patient using any DME equipment during [address]? yes    Depression Screening:  3 most recent PHQ Screens 12/17/2020   PHQ Not Done -   Little interest or pleasure in doing things Not at all   Feeling down, depressed, irritable, or hopeless Not at all   Total Score PHQ 2 0       Learning Assessment:  Learning Assessment 2/27/2014   PRIMARY LEARNER Patient   PRIMARY LANGUAGE ENGLISH   LEARNER PREFERENCE PRIMARY VIDEOS   ANSWERED BY PATIENT    RELATIONSHIP SELF       Abuse Screening:  Abuse Screening Questionnaire 12/17/2020   Do you ever feel afraid of your partner? N   Are you in a relationship with someone who physically or mentally threatens you? N   Is it safe for you to go home? Y       Fall Risk  Fall Risk Assessment, last 12 mths 11/1/2018   Able to walk? Yes   Fall in past 12 months? Yes   Number of falls in past 12 months 3   Fall with injury? 0       Health Maintenance reviewed and discussed and ordered per Provider. Health Maintenance Due   Topic Date Due    Eye Exam Retinal or Dilated  09/23/1955    Shingrix Vaccine Age 50> (1 of 2) 09/23/1997    Colorectal Cancer Screening Combo  09/23/1997    Breast Cancer Screen Mammogram  09/23/1997    Bone Densitometry (Dexa) Screening  09/23/2012    GLAUCOMA SCREENING Q2Y  11/17/2016    MICROALBUMIN Q1  06/04/2019    Flu Vaccine (1) 09/01/2020    A1C test (Diabetic or Prediabetic)  10/22/2020   . Coordination of Care:  1. Have you been to the ER, urgent care clinic since your last visit? Hospitalized since your last visit? Yes, [facility] for leg concerns    2. Have you seen or consulted any other health care providers outside of the [address] [place] [address] since your last visit? Include any pap smears or colon screening. no      Last  Checked n/a  Last UDS Checked n/a  Last Pain contract signed: n/a

## 2021-01-01 PROCEDURE — 3331090001 HH PPS REVENUE CREDIT

## 2021-01-01 PROCEDURE — 3331090002 HH PPS REVENUE DEBIT

## 2021-01-02 ENCOUNTER — HOME CARE VISIT (OUTPATIENT)
Dept: SCHEDULING | Facility: HOME HEALTH | Age: 76
End: 2021-01-02
Payer: MEDICARE

## 2021-01-02 PROCEDURE — 3331090002 HH PPS REVENUE DEBIT

## 2021-01-02 PROCEDURE — G0157 HHC PT ASSISTANT EA 15: HCPCS

## 2021-01-02 PROCEDURE — 3331090001 HH PPS REVENUE CREDIT

## 2021-01-03 PROCEDURE — 3331090001 HH PPS REVENUE CREDIT

## 2021-01-03 PROCEDURE — 3331090002 HH PPS REVENUE DEBIT

## 2021-01-04 ENCOUNTER — HOME CARE VISIT (OUTPATIENT)
Dept: HOME HEALTH SERVICES | Facility: HOME HEALTH | Age: 76
End: 2021-01-04
Payer: MEDICARE

## 2021-01-04 VITALS
RESPIRATION RATE: 13 BRPM | TEMPERATURE: 97.8 F | SYSTOLIC BLOOD PRESSURE: 124 MMHG | HEART RATE: 89 BPM | DIASTOLIC BLOOD PRESSURE: 68 MMHG | OXYGEN SATURATION: 98 %

## 2021-01-04 PROCEDURE — 3331090003 HH PPS REVENUE ADJ

## 2021-01-04 PROCEDURE — 3331090001 HH PPS REVENUE CREDIT

## 2021-01-04 PROCEDURE — 3331090002 HH PPS REVENUE DEBIT

## 2021-01-05 ENCOUNTER — HOME CARE VISIT (OUTPATIENT)
Dept: HOME HEALTH SERVICES | Facility: HOME HEALTH | Age: 76
End: 2021-01-05
Payer: MEDICARE

## 2021-01-05 PROCEDURE — 3331090002 HH PPS REVENUE DEBIT

## 2021-01-05 PROCEDURE — 3331090001 HH PPS REVENUE CREDIT

## 2021-01-05 RX ORDER — INSULIN GLARGINE 100 [IU]/ML
INJECTION, SOLUTION SUBCUTANEOUS
Qty: 15 ML | Refills: 0 | Status: SHIPPED | OUTPATIENT
Start: 2021-01-05 | End: 2021-02-12

## 2021-01-06 PROCEDURE — 3331090001 HH PPS REVENUE CREDIT

## 2021-01-06 PROCEDURE — 3331090002 HH PPS REVENUE DEBIT

## 2021-01-07 PROCEDURE — 3331090001 HH PPS REVENUE CREDIT

## 2021-01-07 PROCEDURE — 3331090002 HH PPS REVENUE DEBIT

## 2021-01-08 ENCOUNTER — PATIENT OUTREACH (OUTPATIENT)
Dept: CASE MANAGEMENT | Age: 76
End: 2021-01-08

## 2021-01-08 PROCEDURE — 3331090002 HH PPS REVENUE DEBIT

## 2021-01-08 PROCEDURE — 3331090001 HH PPS REVENUE CREDIT

## 2021-01-09 PROCEDURE — 3331090001 HH PPS REVENUE CREDIT

## 2021-01-09 PROCEDURE — 3331090002 HH PPS REVENUE DEBIT

## 2021-01-10 PROCEDURE — 3331090002 HH PPS REVENUE DEBIT

## 2021-01-10 PROCEDURE — 3331090001 HH PPS REVENUE CREDIT

## 2021-01-11 PROCEDURE — 3331090002 HH PPS REVENUE DEBIT

## 2021-01-11 PROCEDURE — 3331090001 HH PPS REVENUE CREDIT

## 2021-01-12 PROCEDURE — 3331090001 HH PPS REVENUE CREDIT

## 2021-01-12 PROCEDURE — 3331090002 HH PPS REVENUE DEBIT

## 2021-01-13 PROCEDURE — 3331090002 HH PPS REVENUE DEBIT

## 2021-01-13 PROCEDURE — 3331090001 HH PPS REVENUE CREDIT

## 2021-01-14 ENCOUNTER — PATIENT OUTREACH (OUTPATIENT)
Dept: CASE MANAGEMENT | Age: 76
End: 2021-01-14

## 2021-01-14 PROCEDURE — 3331090002 HH PPS REVENUE DEBIT

## 2021-01-14 PROCEDURE — 3331090001 HH PPS REVENUE CREDIT

## 2021-01-14 NOTE — PROGRESS NOTES
Patient attended appointment with Dr. Rand Olivares on 1-14-21. Ambulatory Care Manager reviewed EMR and will follow up on next scheduled outreach.

## 2021-01-15 PROCEDURE — 3331090001 HH PPS REVENUE CREDIT

## 2021-01-15 PROCEDURE — 3331090002 HH PPS REVENUE DEBIT

## 2021-01-16 PROCEDURE — 3331090002 HH PPS REVENUE DEBIT

## 2021-01-16 PROCEDURE — 3331090001 HH PPS REVENUE CREDIT

## 2021-01-17 PROCEDURE — 3331090001 HH PPS REVENUE CREDIT

## 2021-01-17 PROCEDURE — 3331090002 HH PPS REVENUE DEBIT

## 2021-01-18 PROCEDURE — 3331090001 HH PPS REVENUE CREDIT

## 2021-01-18 PROCEDURE — 3331090002 HH PPS REVENUE DEBIT

## 2021-01-19 PROCEDURE — 3331090001 HH PPS REVENUE CREDIT

## 2021-01-19 PROCEDURE — 3331090002 HH PPS REVENUE DEBIT

## 2021-01-20 PROCEDURE — 3331090001 HH PPS REVENUE CREDIT

## 2021-01-20 PROCEDURE — 3331090002 HH PPS REVENUE DEBIT

## 2021-01-21 PROCEDURE — 3331090002 HH PPS REVENUE DEBIT

## 2021-01-21 PROCEDURE — 3331090001 HH PPS REVENUE CREDIT

## 2021-01-22 ENCOUNTER — PATIENT OUTREACH (OUTPATIENT)
Dept: CASE MANAGEMENT | Age: 76
End: 2021-01-22

## 2021-01-22 PROCEDURE — 3331090002 HH PPS REVENUE DEBIT

## 2021-01-22 PROCEDURE — 3331090001 HH PPS REVENUE CREDIT

## 2021-01-23 PROCEDURE — 3331090001 HH PPS REVENUE CREDIT

## 2021-01-23 PROCEDURE — 3331090002 HH PPS REVENUE DEBIT

## 2021-01-24 PROCEDURE — 3331090001 HH PPS REVENUE CREDIT

## 2021-01-24 PROCEDURE — 3331090002 HH PPS REVENUE DEBIT

## 2021-01-25 PROCEDURE — 3331090002 HH PPS REVENUE DEBIT

## 2021-01-25 PROCEDURE — 3331090001 HH PPS REVENUE CREDIT

## 2021-01-26 PROCEDURE — 3331090001 HH PPS REVENUE CREDIT

## 2021-01-26 PROCEDURE — 3331090002 HH PPS REVENUE DEBIT

## 2021-01-27 PROCEDURE — 3331090002 HH PPS REVENUE DEBIT

## 2021-01-27 PROCEDURE — 3331090001 HH PPS REVENUE CREDIT

## 2021-01-28 PROCEDURE — 3331090001 HH PPS REVENUE CREDIT

## 2021-01-28 PROCEDURE — 3331090002 HH PPS REVENUE DEBIT

## 2021-01-28 PROCEDURE — 3331090003 HH PPS REVENUE ADJ

## 2021-01-29 PROCEDURE — 3331090001 HH PPS REVENUE CREDIT

## 2021-01-29 PROCEDURE — 3331090002 HH PPS REVENUE DEBIT

## 2021-01-29 PROCEDURE — 400018 HH-NO PAY CLAIM PROCEDURE

## 2021-01-29 PROCEDURE — 3331090003 HH PPS REVENUE ADJ

## 2021-01-29 PROCEDURE — 400013 HH SOC

## 2021-01-30 PROCEDURE — 3331090002 HH PPS REVENUE DEBIT

## 2021-01-30 PROCEDURE — 3331090001 HH PPS REVENUE CREDIT

## 2021-01-31 PROCEDURE — 3331090001 HH PPS REVENUE CREDIT

## 2021-01-31 PROCEDURE — 3331090002 HH PPS REVENUE DEBIT

## 2021-02-01 PROCEDURE — 3331090001 HH PPS REVENUE CREDIT

## 2021-02-01 PROCEDURE — 3331090002 HH PPS REVENUE DEBIT

## 2021-02-02 ENCOUNTER — PATIENT OUTREACH (OUTPATIENT)
Dept: CASE MANAGEMENT | Age: 76
End: 2021-02-02

## 2021-02-02 PROCEDURE — 3331090002 HH PPS REVENUE DEBIT

## 2021-02-02 PROCEDURE — 3331090001 HH PPS REVENUE CREDIT

## 2021-02-03 PROCEDURE — 3331090002 HH PPS REVENUE DEBIT

## 2021-02-03 PROCEDURE — 3331090001 HH PPS REVENUE CREDIT

## 2021-02-04 PROCEDURE — 3331090001 HH PPS REVENUE CREDIT

## 2021-02-04 PROCEDURE — 3331090002 HH PPS REVENUE DEBIT

## 2021-02-05 PROCEDURE — 3331090001 HH PPS REVENUE CREDIT

## 2021-02-05 PROCEDURE — 3331090002 HH PPS REVENUE DEBIT

## 2021-02-06 PROCEDURE — 3331090001 HH PPS REVENUE CREDIT

## 2021-02-06 PROCEDURE — 3331090002 HH PPS REVENUE DEBIT

## 2021-02-07 PROCEDURE — 3331090002 HH PPS REVENUE DEBIT

## 2021-02-07 PROCEDURE — 3331090001 HH PPS REVENUE CREDIT

## 2021-02-08 PROCEDURE — 3331090001 HH PPS REVENUE CREDIT

## 2021-02-08 PROCEDURE — 3331090002 HH PPS REVENUE DEBIT

## 2021-02-09 PROCEDURE — 3331090001 HH PPS REVENUE CREDIT

## 2021-02-09 PROCEDURE — 3331090002 HH PPS REVENUE DEBIT

## 2021-02-10 PROCEDURE — 3331090002 HH PPS REVENUE DEBIT

## 2021-02-10 PROCEDURE — 3331090001 HH PPS REVENUE CREDIT

## 2021-02-11 PROCEDURE — 3331090002 HH PPS REVENUE DEBIT

## 2021-02-11 PROCEDURE — 3331090001 HH PPS REVENUE CREDIT

## 2021-02-12 PROCEDURE — 3331090001 HH PPS REVENUE CREDIT

## 2021-02-12 PROCEDURE — 3331090002 HH PPS REVENUE DEBIT

## 2021-02-12 RX ORDER — INSULIN GLARGINE 100 [IU]/ML
INJECTION, SOLUTION SUBCUTANEOUS
Qty: 15 ML | Refills: 0 | Status: SHIPPED | OUTPATIENT
Start: 2021-02-12 | End: 2021-02-19 | Stop reason: DRUGHIGH

## 2021-02-13 PROCEDURE — 3331090001 HH PPS REVENUE CREDIT

## 2021-02-13 PROCEDURE — 3331090002 HH PPS REVENUE DEBIT

## 2021-02-14 PROCEDURE — 3331090001 HH PPS REVENUE CREDIT

## 2021-02-14 PROCEDURE — 3331090002 HH PPS REVENUE DEBIT

## 2021-02-15 PROCEDURE — 3331090002 HH PPS REVENUE DEBIT

## 2021-02-15 PROCEDURE — 3331090001 HH PPS REVENUE CREDIT

## 2021-02-16 PROCEDURE — 3331090001 HH PPS REVENUE CREDIT

## 2021-02-16 PROCEDURE — 3331090002 HH PPS REVENUE DEBIT

## 2021-02-17 PROCEDURE — 3331090001 HH PPS REVENUE CREDIT

## 2021-02-17 PROCEDURE — 3331090002 HH PPS REVENUE DEBIT

## 2021-02-18 PROCEDURE — 3331090001 HH PPS REVENUE CREDIT

## 2021-02-18 PROCEDURE — 3331090002 HH PPS REVENUE DEBIT

## 2021-02-19 ENCOUNTER — PATIENT OUTREACH (OUTPATIENT)
Dept: CASE MANAGEMENT | Age: 76
End: 2021-02-19

## 2021-02-19 PROCEDURE — 3331090002 HH PPS REVENUE DEBIT

## 2021-02-19 PROCEDURE — 3331090001 HH PPS REVENUE CREDIT

## 2021-02-19 RX ORDER — INSULIN LISPRO 100 [IU]/ML
10 INJECTION, SOLUTION INTRAVENOUS; SUBCUTANEOUS
COMMUNITY
Start: 2021-02-16

## 2021-02-19 RX ORDER — INSULIN GLARGINE 100 [IU]/ML
32 INJECTION, SOLUTION SUBCUTANEOUS
COMMUNITY
Start: 2021-02-16

## 2021-02-19 RX ORDER — TRAMADOL HYDROCHLORIDE 50 MG/1
50 TABLET ORAL
COMMUNITY
Start: 2021-02-18

## 2021-02-19 NOTE — PROGRESS NOTES
Patient was admitted to [facility] on 2/11/21 and discharged on 2/16/21 for COVID PNA. Subsequently patient was admitted to [facility] 2/17/21 to 2/18/21 for hematoma; right thigh. Outreach made within 2 business days of discharge: Yes    Top Discharge Challenges to be reviewed by the provider   Additional needs identified to be addressed with provider no  none  Discussed COVID-19 related testing which was available at this time. Test results were positive. Patient informed of results, if available? yes   Method of communication with provider : chart routing       Advance Care Planning:   Does patient have an Advance Directive:  not on file    Inpatient Readmission Risk score: 44%  Was this a readmission? yes   Patient stated reason for the admission: unable to assess at time of outreach  Patients top risk factors for readmission: medical condition  Interventions to address risk factors: none provided at time of outreach    Care Transition Nurse (CTN) contacted the patient by telephone to perform post hospital discharge assessment. Patient voiced physical therapist is at the door. Agreed to follow up call at a later time. Home Health/Outpatient orders at discharge: [facility]  Date of initial visit: 2/19/21    Durable Medical Equipment ordered at discharge: None      [facility] follow up appointment(s):   Future Appointments   Date Time Provider Cara Ovalles   2/25/2021  2:45 PM Raimundo Ricketts MD [facility]     Non[facility] follow up appointment(s): None  Plan for follow-up call in 3-5 days based on severity of symptoms and risk factors. CTN provided contact information for future needs. Goals Addressed                 This Visit's Progress     COMPLETED: Attend follow up appointments on schedule        Attends follow-up appointments as directed. Goal: Patient will attend all appointments scheduled within the next 30 days.       Ensure provider appt is scheduled within 7-14 days post-discharge; 2/19: AMEYA appt scheduled. Confirm patient attended post-discharge provider appt   Complete post-visit call to confirm attendance and update care needs           COMPLETED: Prepare patients and caregivers for end of life decisions (ie. need for hospice, pain management, symptom relief, advance directives etc.)        Prevent complications post hospitalization. Goal: No admissions post 30 days from discharge of 2/19/21.       Review/educate common or potential \"red flags\" of condition worsening  Consult for polypharmacy (pharmacist or provider)  Evaluate adherence to medications and priority barriers to resolve      Instruct on adherence to medications as ordered and assess for therapeutic response and side-effects       Monitor lab results and symptoms, escalate to provider; 2/19: Per d/c summary follow up CMP needed     Communicate visits and goals between multiple providers and services    Assess for health risk behaviors and educate patient/caregivers on reducing risk   Coordinate plan to treat at home when symptoms arise    Observe for trends in symptoms and measures, provide direction to patient, and notify provider as needed       Discuss and provide resources for ACP; 2/19: Not on file; will address at a later time           COMPLETED: Reduce ED Utilization        COMPLETED: Take all medications as ordered

## 2021-02-20 PROCEDURE — 3331090002 HH PPS REVENUE DEBIT

## 2021-02-20 PROCEDURE — 3331090001 HH PPS REVENUE CREDIT

## 2021-02-21 PROCEDURE — 3331090002 HH PPS REVENUE DEBIT

## 2021-02-21 PROCEDURE — 3331090001 HH PPS REVENUE CREDIT

## 2021-02-22 ENCOUNTER — PATIENT OUTREACH (OUTPATIENT)
Dept: CASE MANAGEMENT | Age: 76
End: 2021-02-22

## 2021-02-22 PROCEDURE — 3331090001 HH PPS REVENUE CREDIT

## 2021-02-22 PROCEDURE — 3331090002 HH PPS REVENUE DEBIT

## 2021-02-22 NOTE — PROGRESS NOTES
Patient was admitted to [facility] on 2/11/21 and discharged on 2/16/21 for COVID PNA. Subsequently patient was admitted to [facility] 2/17/21 to 2/18/21 for hematoma; right thigh. Patient readmitted to [facility] on 2/20/21. Will continue to monitor.

## 2021-02-23 ENCOUNTER — PATIENT OUTREACH (OUTPATIENT)
Dept: CASE MANAGEMENT | Age: 76
End: 2021-02-23

## 2021-02-23 PROCEDURE — 3331090001 HH PPS REVENUE CREDIT

## 2021-02-23 PROCEDURE — 3331090002 HH PPS REVENUE DEBIT

## 2021-02-23 NOTE — PROGRESS NOTES
Patient was admitted [facility] 2/11/21 and discharged on 2/16/21 for COVID PNA. Subsequently patient was admitted to [facility] 2/17/21 to 2/18/21 for hematoma; right thigh.      Patient readmitted to [facility] on 2/20/21 to 2/22/21 for transition of care. Patient transferred to [facility] for continuation of care. Transition of care outreach postponed for 21 days due to patient's discharge to non-preferred network SNF.

## 2021-02-24 PROCEDURE — 3331090002 HH PPS REVENUE DEBIT

## 2021-02-24 PROCEDURE — 3331090001 HH PPS REVENUE CREDIT

## 2021-02-25 PROCEDURE — 3331090001 HH PPS REVENUE CREDIT

## 2021-02-25 PROCEDURE — 3331090002 HH PPS REVENUE DEBIT

## 2021-02-26 PROCEDURE — 3331090001 HH PPS REVENUE CREDIT

## 2021-02-26 PROCEDURE — 3331090002 HH PPS REVENUE DEBIT

## 2021-02-27 PROCEDURE — 3331090001 HH PPS REVENUE CREDIT

## 2021-02-27 PROCEDURE — 3331090003 HH PPS REVENUE ADJ

## 2021-02-27 PROCEDURE — 3331090002 HH PPS REVENUE DEBIT

## 2021-03-01 ENCOUNTER — HOME CARE VISIT (OUTPATIENT)
Dept: HOME HEALTH SERVICES | Facility: HOME HEALTH | Age: 76
End: 2021-03-01

## 2021-03-16 ENCOUNTER — PATIENT OUTREACH (OUTPATIENT)
Dept: CASE MANAGEMENT | Age: 76
End: 2021-03-16

## 2021-03-16 NOTE — PROGRESS NOTES
Emily Whitmore for SNF follow up. Provided 2 patient identifiers. Patient remains a current admission at this time. Call transferred to social work office. No answer. Left message including introduction, 2 patient identifiers, reason for call and contact info.

## 2021-03-19 ENCOUNTER — PATIENT OUTREACH (OUTPATIENT)
Dept: CASE MANAGEMENT | Age: 76
End: 2021-03-19

## 2021-03-19 NOTE — PROGRESS NOTES
Contacted [address] [facility] [address]  for SNF follow up. Spoke to JAXON Bello. Provided 2 patient identifiers. Patient remains a current admission with plans to transition to LTC. Episode of care resolved.

## 2021-06-02 NOTE — TELEPHONE ENCOUNTER
Please see medication refill request. Please advise.  Thank you
Hpi Title: Evaluation of Skin Lesions
How Severe Are Your Spot(S)?: moderate
Have Your Spot(S) Been Treated In The Past?: has not been treated

## 2021-12-02 NOTE — PROGRESS NOTES
Attempted to contact patient for Transition of Care follow up. No answer, left message with contact information provided. Will attempt to contact at a later time. ekg on chart 10/21

## 2022-03-18 PROBLEM — Z95.0 PACEMAKER: Status: ACTIVE | Noted: 2018-10-26

## 2022-03-19 PROBLEM — E11.40 TYPE 2 DIABETES MELLITUS WITH DIABETIC NEUROPATHY (HCC): Status: ACTIVE | Noted: 2018-03-29

## 2022-03-19 PROBLEM — Z71.89 ADVANCE CARE PLANNING: Status: ACTIVE | Noted: 2018-06-04

## 2022-03-19 PROBLEM — E66.01 OBESITY, MORBID (HCC): Status: ACTIVE | Noted: 2018-03-29

## 2022-03-19 PROBLEM — I63.9 STROKE (HCC): Status: ACTIVE | Noted: 2018-03-20

## 2022-03-19 PROBLEM — E11.21 TYPE 2 DIABETES WITH NEPHROPATHY (HCC): Status: ACTIVE | Noted: 2018-03-29

## 2022-03-20 PROBLEM — I44.1 MOBITZ TYPE 2 SECOND DEGREE ATRIOVENTRICULAR BLOCK: Status: ACTIVE | Noted: 2018-10-23

## 2022-11-17 NOTE — DIABETES MGMT
Glycemic Control: BG is within target range while NPO for procedure. Recent blood glucose:   
     
Lab Results Component Value Date/Time  
  GLUCPOC 110 10/25/2018 08:21 AM  
  GLUCPOC 137 (H) 10/24/2018 10:00 PM  
  GLUCPOC 120 (H) 10/24/2018 05:43 PM  
Within target range (non-ICU: <140; ICU<180): [x] Yes   []  No 
Current Insulin regimen: 12 units lantus /d plus corrective lispro Home medication/insulin regimen: 30 units lantus/d plus 10 units novolog with meals HbA1c:7.5 equivalent  to ave Blood Glucose of 169mg/dl for 2-3 months prior to admission Adequate glycemic control PTA:  [x] Yes  [] No 
Provided Diabetes Education 10/25.  
Fannie SAN 
 
 
 [FreeTextEntry3] : Raul Arriaza M.D.\par Director Emeritus of Urology\par [facility]\par [address] [place] [address], [address]\[place] [address]

## 2023-06-15 ENCOUNTER — TELEPHONE (OUTPATIENT)
Facility: CLINIC | Age: 78
End: 2023-06-15

## 2024-01-12 NOTE — TELEPHONE ENCOUNTER
[employer] test strips  True plus lancets
Medication refill request was sent to Dr. Gay Boards for approval or denial. Awaiting response.
Patient Education        Influenza (Flu) in Children: Care Instructions  Your Care Instructions    Flu, also called influenza, is caused by a virus. Flu tends to come on more quickly and is usually worse than a cold. Your child may suddenly develop a fever, chills, body aches, a headache, and a cough. The fever, chills, and body aches can last for 5 to 7 days. Your child may have a cough, a runny nose, and a sore throat for another week or more. Family members can get the flu from coughs or sneezes or by touching something that your child has coughed or sneezed on. Most of the time, the flu does not need any medicine other than acetaminophen (Tylenol). But sometimes doctors prescribe antiviral medicines. If started within 2 days of your child getting the flu, these medicines can help prevent problems from the flu and help your child get better a day or two sooner than he or she would without the medicine. Your doctor will not prescribe an antibiotic for the flu, because antibiotics do not work for viruses. But sometimes children get an ear infection or other bacterial infections with the flu. Antibiotics may be used in these cases. Follow-up care is a key part of your child's treatment and safety. Be sure to make and go to all appointments, and call your doctor if your child is having problems. It's also a good idea to know your child's test results and keep a list of the medicines your child takes. How can you care for your child at home? · Give your child acetaminophen (Tylenol) or ibuprofen (Advil, Motrin) for fever, pain, or fussiness. Read and follow all instructions on the label. Do not give aspirin to anyone younger than 20. It has been linked to Reye syndrome, a serious illness. · Be careful with cough and cold medicines. Don't give them to children younger than 6, because they don't work for children that age and can even be harmful.  For children 6 and older, always follow all the instructions
carefully. Make sure you know how much medicine to give and how long to use it. And use the dosing device if one is included. · Be careful when giving your child over-the-counter cold or flu medicines and Tylenol at the same time. Many of these medicines have acetaminophen, which is Tylenol. Read the labels to make sure that you are not giving your child more than the recommended dose. Too much Tylenol can be harmful. · Keep children home from school and other public places until they have had no fever for 24 hours. The fever needs to have gone away on its own without the help of medicine. · If your child has problems breathing because of a stuffy nose, squirt a few saline (saltwater) nasal drops in one nostril. For older children, have your child blow his or her nose. Repeat for the other nostril. For infants, put a drop or two in one nostril. Using a soft rubber suction bulb, squeeze air out of the bulb, and gently place the tip of the bulb inside the baby's nose. Relax your hand to suck the mucus from the nose. Repeat in the other nostril. · Place a humidifier by your child's bed or close to your child. This may make it easier for your child to breathe. Follow the directions for cleaning the machine. · Keep your child away from smoke. Do not smoke or let anyone else smoke in your house. · Wash your hands and your child's hands often so you do not spread the flu. · Have your child take medicines exactly as prescribed. Call your doctor if you think your child is having a problem with his or her medicine. When should you call for help? Call 911 anytime you think your child may need emergency care. For example, call if:    · Your child has severe trouble breathing.  Signs may include the chest sinking in, using belly muscles to breathe, or nostrils flaring while your child is struggling to breathe.    Call your doctor now or seek immediate medical care if:    · Your child has a fever with a stiff neck or a
severe headache.     · Your child is confused, does not know where he or she is, or is extremely sleepy or hard to wake up.     · Your child has trouble breathing, breathes very fast, or coughs all the time.     · Your child has a high fever.     · Your child has signs of needing more fluids. These signs include sunken eyes with few tears, dry mouth with little or no spit, and little or no urine for 6 hours.    Watch closely for changes in your child's health, and be sure to contact your doctor if:    · Your child has new symptoms, such as a rash, an earache, or a sore throat.     · Your child cannot keep down medicine or liquids.     · Your child does not get better after 5 to 7 days. Where can you learn more? Go to http://grace-lorraine.info/. Enter 96 [id number] in the search box to learn more about \"Influenza (Flu) in Children: Care Instructions. \"  Current as of: June 9, 2019  Content Version: 12.2  © 4467-9707 [employer]. Care instructions adapted under license by [employer] (which disclaims liability or warranty for this information). If you have questions about a medical condition or this instruction, always ask your healthcare professional. [place] [address] any warranty or liability for your use of this information.
no psychiatric contraindications to d/c planning as long as behaviors at baseline, no prn needs and a safe d/c plan is coordinated with family  resume f/u with psychiatrist upon d/c

## (undated) DEVICE — 3M™ STERI-STRIP™ REINFORCED ADHESIVE SKIN CLOSURES, R1541, 1/4 IN X 3 IN (6 MM X 75 MM), 3 STRIPS/ENVELOPE: Brand: 3M™ STERI-STRIP™

## (undated) DEVICE — REM POLYHESIVE ADULT PATIENT RETURN ELECTRODE: Brand: VALLEYLAB

## (undated) DEVICE — PACEMAKER PACK: Brand: MEDLINE INDUSTRIES, INC.

## (undated) DEVICE — NEEDLE HYPO 25GA L1.5IN BVL ORIENTED ECLIPSE

## (undated) DEVICE — INTRO SHTH 7FR 13X20CM -- TEARAWAY

## (undated) DEVICE — Device

## (undated) DEVICE — SYR 10ML CTRL LR LCK NSAF LF --

## (undated) DEVICE — ELECTRD ECG DEFIB/MULT-USE AD --

## (undated) DEVICE — STERILE POLYISOPRENE POWDER-FREE SURGICAL GLOVES: Brand: PROTEXIS

## (undated) DEVICE — CABLE PACE ALGTR CLP SAF 12FT --

## (undated) DEVICE — SUTURE PERMA HND SZ 0 L18IN NONABSORBABLE BLK L30MM PSL REV 580H

## (undated) DEVICE — SUTURE MCRYL SZ 4-0 L18IN ABSRB UD L19MM PS-2 3/8 CIR PRIM Y496G

## (undated) DEVICE — 3M™ STERI-STRIP™ COMPOUND BENZOIN TINCTURE 40 BAGS/CARTON 4 CARTONS/CASE C1544: Brand: 3M™ STERI-STRIP™

## (undated) DEVICE — SYRINGE BLB 50CC IRRIG PLIABLE FNGR FLNG GRAD FLSK DISP

## (undated) DEVICE — SUTURE ABSORBABLE BRAIDED 2-0 CT-1 27 IN UD VICRYL J259H

## (undated) DEVICE — SLING ARM 2-37INX8-17IN PCH -- MED